# Patient Record
Sex: MALE | Race: WHITE | NOT HISPANIC OR LATINO | Employment: OTHER | URBAN - METROPOLITAN AREA
[De-identification: names, ages, dates, MRNs, and addresses within clinical notes are randomized per-mention and may not be internally consistent; named-entity substitution may affect disease eponyms.]

---

## 2017-01-27 ENCOUNTER — ALLSCRIPTS OFFICE VISIT (OUTPATIENT)
Dept: OTHER | Facility: OTHER | Age: 62
End: 2017-01-27

## 2017-01-27 ENCOUNTER — LAB (OUTPATIENT)
Dept: LAB | Facility: HOSPITAL | Age: 62
End: 2017-01-27
Payer: COMMERCIAL

## 2017-01-27 DIAGNOSIS — M54.50 LOW BACK PAIN: ICD-10-CM

## 2017-01-27 DIAGNOSIS — M54.9 DORSALGIA: ICD-10-CM

## 2017-01-27 PROCEDURE — 80307 DRUG TEST PRSMV CHEM ANLYZR: CPT

## 2017-01-31 LAB
AMPHETAMINES UR QL SCN: NEGATIVE NG/ML
BARBITURATES UR QL SCN: NEGATIVE NG/ML
BENZODIAZ UR QL SCN: NEGATIVE NG/ML
BZE UR QL SCN: NEGATIVE NG/ML
CANNABINOIDS UR QL SCN: NEGATIVE NG/ML
METHADONE UR QL SCN: NEGATIVE NG/ML
OPIATES UR QL: NEGATIVE
OXYCODONE+OXYMORPHONE SERPLBLD QL SCN: NEGATIVE
OXYCODONE+OXYMORPHONE UR QL SCN: POSITIVE
OXYMORPHONE UR CFM-MCNC: 114 NG/ML
OXYMORPHONE UR QL CFM: POSITIVE
PCP UR QL: NEGATIVE NG/ML
PROPOXYPH UR QL: NEGATIVE NG/ML

## 2017-02-14 LAB — MISCELLANEOUS LAB TEST RESULT: NORMAL

## 2017-04-27 ENCOUNTER — ALLSCRIPTS OFFICE VISIT (OUTPATIENT)
Dept: OTHER | Facility: OTHER | Age: 62
End: 2017-04-27

## 2017-05-04 ENCOUNTER — LAB REQUISITION (OUTPATIENT)
Dept: LAB | Facility: HOSPITAL | Age: 62
End: 2017-05-04
Payer: COMMERCIAL

## 2017-05-04 DIAGNOSIS — M54.9 DORSALGIA: ICD-10-CM

## 2017-05-04 DIAGNOSIS — M54.50 LOW BACK PAIN: ICD-10-CM

## 2017-05-04 PROCEDURE — 80307 DRUG TEST PRSMV CHEM ANLYZR: CPT | Performed by: FAMILY MEDICINE

## 2017-05-06 LAB
OXYCODONE UR QL CFM: 4237 NG/ML
OXYCODONE+OXYMORPHONE SERPLBLD QL SCN: POSITIVE
OXYCODONE+OXYMORPHONE UR QL SCN: POSITIVE
OXYMORPHONE UR CFM-MCNC: 6334 NG/ML
OXYMORPHONE UR QL CFM: POSITIVE

## 2017-07-25 ENCOUNTER — ALLSCRIPTS OFFICE VISIT (OUTPATIENT)
Dept: OTHER | Facility: OTHER | Age: 62
End: 2017-07-25

## 2017-10-17 ENCOUNTER — ALLSCRIPTS OFFICE VISIT (OUTPATIENT)
Dept: OTHER | Facility: OTHER | Age: 62
End: 2017-10-17

## 2017-10-17 DIAGNOSIS — Z13.1 ENCOUNTER FOR SCREENING FOR DIABETES MELLITUS: ICD-10-CM

## 2017-10-17 DIAGNOSIS — R63.4 ABNORMAL WEIGHT LOSS: ICD-10-CM

## 2017-10-17 DIAGNOSIS — Z13.6 ENCOUNTER FOR SCREENING FOR CARDIOVASCULAR DISORDERS: ICD-10-CM

## 2018-01-12 VITALS
WEIGHT: 177.5 LBS | DIASTOLIC BLOOD PRESSURE: 78 MMHG | HEIGHT: 72 IN | BODY MASS INDEX: 24.04 KG/M2 | OXYGEN SATURATION: 96 % | SYSTOLIC BLOOD PRESSURE: 132 MMHG | RESPIRATION RATE: 15 BRPM | HEART RATE: 82 BPM

## 2018-01-13 VITALS
WEIGHT: 178.25 LBS | RESPIRATION RATE: 15 BRPM | HEART RATE: 90 BPM | OXYGEN SATURATION: 97 % | BODY MASS INDEX: 24.14 KG/M2 | DIASTOLIC BLOOD PRESSURE: 78 MMHG | HEIGHT: 72 IN | SYSTOLIC BLOOD PRESSURE: 148 MMHG

## 2018-01-13 VITALS
DIASTOLIC BLOOD PRESSURE: 84 MMHG | RESPIRATION RATE: 15 BRPM | WEIGHT: 169.5 LBS | OXYGEN SATURATION: 98 % | HEIGHT: 72 IN | BODY MASS INDEX: 22.96 KG/M2 | HEART RATE: 77 BPM | SYSTOLIC BLOOD PRESSURE: 144 MMHG

## 2018-01-13 VITALS
DIASTOLIC BLOOD PRESSURE: 84 MMHG | WEIGHT: 174 LBS | OXYGEN SATURATION: 96 % | RESPIRATION RATE: 15 BRPM | SYSTOLIC BLOOD PRESSURE: 140 MMHG | HEART RATE: 97 BPM | HEIGHT: 72 IN | BODY MASS INDEX: 23.57 KG/M2

## 2018-01-15 ENCOUNTER — ALLSCRIPTS OFFICE VISIT (OUTPATIENT)
Dept: OTHER | Facility: OTHER | Age: 63
End: 2018-01-15

## 2018-01-16 NOTE — PROGRESS NOTES
Assessment   1  Benign essential hypertension (401 1) (I10)   2  Chronic obstructive pulmonary disease (496) (J44 9)   3  Chronic low back pain (724 2,338 29) (M54 5,G89 29)    Plan   Benign essential hypertension    · AmLODIPine Besylate 5 MG Oral Tablet; TAKE 1 TABLET DAILY AS DIRECTED  Chronic low back pain    · Capsaicin 0 025 % External Cream; APPLY GENTLY TO AFFECTED AREA 3-4    TIMES DAILY  Chronic obstructive pulmonary disease    · Doxycycline Hyclate 100 MG Oral Capsule   · Combivent Respimat  MCG/ACT Inhalation Aerosol Solution; ONE    INHALATION 4 TIMES DAILY AS NEEDED (MAX OF 6 INHALATIONS PER    24 HOURS)  Depression with anxiety    · DULoxetine HCl - 60 MG Oral Capsule Delayed Release Particles; TAKE 1    CAPSULE BY MOUTH EVERY DAY  GERD (gastroesophageal reflux disease)    · Pantoprazole Sodium 40 MG Oral Tablet Delayed Release (Protonix); TAKE 1    TABLET DAILY  Left knee pain    · Diclofenac Sodium 1 % Transdermal Gel; APPLY 2 GRAMS AFFECTED AREA-S    EVERY 6 HOURS AS NEEDED FOR PAIN -EXTERNAL USE ONLY  Other chronic pain    · Acetaminophen-Codeine #4 300-60 MG Oral Tablet; TAKE 1 TABLET EVERY 6    TO 8 HOURS AS NEEDED FOR PAIN   · Cyclobenzaprine HCl - 10 MG Oral Tablet; Take 1 three times daily as needed   · Oxycodone-Acetaminophen 5-325 MG Oral Tablet; TAKE 1 TABLET EVERY 6    HOURS AS NEEDED    Discussion/Summary   Possible side effects of new medications were reviewed with the patient/guardian today  The treatment plan was reviewed with the patient/guardian  The patient/guardian understands and agrees with the treatment plan      Chief Complaint   PT C/O KNEE PAIN  WOULD LIKE SCRIPT FOR VOLTAREN GEL    Patient is here today for follow up of chronic conditions described in HPI  History of Present Illness   His BP is not under control based on the new BP guidelines  He has no CP or SOB  He has no HA or vision changes  has chronic low back pain   He takes Tylenol #4 on a regular basis and Percocet when he needs it  He has a treatment agreement in the chart and urine toxicology consistent with regular use  He has no side effects  He takes Cymbalta for pain as well  is using a knee brace on the left knee  He has a back brace  He gets good relief from both and he benefits from them (support)  COPD is stable  He has no cough and no increased sputum  He has not quit smoking entirely  Review of Systems        Constitutional: No fever or chills, feels well, no tiredness, no recent weight gain or weight loss  Eyes: No complaints of eye pain, no red eyes, no discharge from eyes, no itchy eyes  ENT: no complaints of earache, no hearing loss, no nosebleeds, no nasal discharge, no sore throat, no hoarseness  Cardiovascular: No complaints of slow heart rate, no fast heart rate, no chest pain, no palpitations, no leg claudication, no lower extremity  Respiratory: No complaints of shortness of breath, no wheezing, no cough, no SOB on exertion, no orthopnea or PND  Gastrointestinal: No complaints of abdominal pain, no constipation, no nausea or vomiting, no diarrhea or bloody stools  Genitourinary: No complaints of dysuria, no incontinence, no hesitancy, no nocturia, no genital lesion, no testicular pain  Musculoskeletal: No complaints of arthralgia, no myalgias, no joint swelling or stiffness, no limb pain or swelling  Integumentary: No complaints of skin rash or skin lesions, no itching, no skin wound, no dry skin  Neurological: No compliants of headache, no confusion, no convulsions, no numbness or tingling, no dizziness or fainting, no limb weakness, no difficulty walking  Psychiatric: Is not suicidal, no sleep disturbances, no anxiety or depression, no change in personality, no emotional problems        Endocrine: No complaints of proptosis, no hot flashes, no muscle weakness, no erectile dysfunction, no deepening of the voice, no feelings of weakness  Hematologic/Lymphatic: No complaints of swollen glands, no swollen glands in the neck, does not bleed easily, no easy bruising  Active Problems   1  Anxiety (300 00) (F41 9)   2  Back pain, chronic (724 5,338 29) (M54 9,G89 29)   3  Chronic low back pain (724 2,338 29) (M54 5,G89 29)   4  Chronic obstructive pulmonary disease (496) (J44 9)   5  Current tobacco use (305 1) (Z72 0)   6  Depression screen (V79 0) (Z13 89)   7  Depression with anxiety (300 4) (F41 8)   8  Early satiety (780 94) (R68 81)   9  Encounter for screening for malignant neoplasm of colon (V76 51) (Z12 11)   10  Fatigue (780 79) (R53 83)   11  GERD (gastroesophageal reflux disease) (530 81) (K21 9)   12  Headache (784 0) (R51)   13  Incisional hernia (553 21) (K43 2)   14  Insomnia (780 52) (G47 00)   15  Left knee pain (719 46) (M25 562)   16  Long term use of drug (V58 69) (Z79 899)   17  Malingering (V65 2) (Z76 5)   18  Neck pain (723 1) (M54 2)   19  Osteoarthritis of both knees, unspecified osteoarthritis type (715 96) (M17 0)   20  Other chest pain (786 59) (R07 89)   21  Other chronic pain (338 29) (G89 29)   22  Other instability, right knee (718 86) (M25 361)   23  Pain in joint of left shoulder (719 41) (M25 512)   24  Pain of left hip (719 45) (M25 552)   25  Screening for diabetes mellitus (DM) (V77 1) (Z13 1)   26  Screening for ischemic heart disease (V81 0) (Z13 6)   27  Special screening examination for neoplasm of prostate (V76 44) (Z12 5)   28  Weight loss, non-intentional (783 21) (R63 4)    Past Medical History   1  History of hyperthyroidism (V12 29) (Z86 39)   2  History of upper respiratory infection (V12 09) (Z87 09)    Surgical History   1  History of Inguinal Hernia Repair    Family History   Mother    1  Family history of cardiac disorder (V17 49) (Z82 49)   2  Family history unobtainable (V49 89) (Z78 9)  Father    3  Family history of cardiac disorder (V17 49) (Z82 49)   4   Family history unobtainable (V49 89) (Z78 9)  Maternal Grandmother    5  Family history of cardiac disorder (V17 49) (Z82 49)  Paternal Grandmother    10  Family history of cardiac disorder (V17 49) (Z82 49)  Maternal Grandfather    7  Family history of cardiac disorder (V17 49) (Z82 49)  Paternal Grandfather    8  Family history of cardiac disorder (V17 49) (Z82 49)    Social History    · Denied: History of Alcohol Use (History)   · Denied: History of Drug Use   · Former smoker (V15 82) (K02 462)    Current Meds    1  Acetaminophen-Codeine #4 300-60 MG Oral Tablet; TAKE 1 TABLET EVERY 6 TO 8     HOURS AS NEEDED FOR PAIN;     Therapy: 05Mdj3915 to (Evaluate:04Mar2018); Last Rx:17Oct2017 Ordered   2  Albuterol Sulfate (2 5 MG/3ML) 0 083% Inhalation Nebulization Solution; USE 1 UNIT     DOSE EVERY 4-6 HOURS AS NEEDED FOR WHEEZING ; Therapy: 08NHN4064 to (Last Rx:21Nov2014)  Requested for: 21Nov2014 Ordered   3  ALPRAZolam 0 25 MG Oral Tablet; TAKE 1 TABLET EVERY 12 HOURS AS NEEDED; Last     Rx:67Oft8414 Ordered   4  Aspirin 81 MG TABS; Therapy: (Recorded:14Nov2012) to Recorded   5  Capsaicin 0 025 % External Cream; APPLY GENTLY TO AFFECTED AREA 3-4 TIMES     DAILY; Therapy: 27Apr2017 to (Last Rx:21Ysa7130)  Requested for: 22Bmv2588 Ordered   6  Combivent Respimat  MCG/ACT Inhalation Aerosol Solution; ONE INHALATION 4     TIMES DAILY AS NEEDED (MAX OF 6 INHALATIONS PER 24 HOURS); Therapy: 74NUY6891 to (Last Rx:77Gun9576)  Requested for: 11Klt1207 Ordered   7  Cyclobenzaprine HCl - 10 MG Oral Tablet; Take 1 three times daily as needed; Therapy: 88AZS5743 to (Last Rx:72Bld6891)  Requested for: 80Xuw9519 Ordered   8  Diclofenac Sodium 1 % Transdermal Gel; APPLY 2 GRAMS AFFECTED AREA-S EVERY 6     HOURS AS NEEDED FOR PAIN -EXTERNAL USE ONLY; Therapy: 20ZND4587 to (Last Rx:17Oct2017)  Requested for: 25UJF7487 Ordered   9   Doxycycline Hyclate 100 MG Oral Capsule; TAKE 1 CAPSULE EVERY 12 HOURS DAILY; Therapy: 71TRC1261 to (Evaluate:54Zne3495)  Requested for: 67UGX4131; Last     Rx:27Jan2017 Ordered   10  DULoxetine HCl - 60 MG Oral Capsule Delayed Release Particles; TAKE 1 CAPSULE BY      MOUTH EVERY DAY; Therapy: 12Lnj9469 to (Evaluate:22Nov2017)  Requested for: 99Fun8039; Last      Rx:73Tbl3321 Ordered   11  Ipratropium-Albuterol 0 5-2 5 (3) MG/3ML Inhalation Solution; USE 1 UNIT DOSE IN      NEBULIZER EVERY 4 HOURS AS NEEDED; Therapy: 11JPB0447 to (Last Rx:11Kkq4041)  Requested for: 44Acc7990 Ordered   12  Oxycodone-Acetaminophen 5-325 MG Oral Tablet; TAKE 1 TABLET EVERY 6 HOURS AS      NEEDED; Therapy: 35PDD6928 to (Evaluate:11Nov2017); Last Rx:17Fvq4853 Ordered   13  Pantoprazole Sodium 40 MG Oral Tablet Delayed Release; TAKE 1 TABLET DAILY; Therapy: 99PWX5285 to (21 )  Requested for: 27Apr2017; Last      Rx:27Apr2017 Ordered   14  PredniSONE 10 MG Oral Tablet; 4 tablets daily for 3 days, then      3 tablets daily for 3 days, then      2 tablets daily for 3 days, then      1 tablet daily for 3 days; Therapy: 55YYG0409 to (Last KA:96EWH4010)  Requested for: 93NBB1304 Ordered   15  Stiolto Respimat 2 5-2 5 MCG/ACT Inhalation Aerosol Solution; USE 1 PUFF DAILY; Therapy: 69ZOM7564 to (Last Jazielopal Whiteuber)  Requested for: 27Apr2017 Ordered   16  Zolpidem Tartrate 5 MG Oral Tablet; TAKE 1 TABLET AT BEDTIME AS NEEDED; Therapy: 77NOR9924 to (Evaluate:15Jan2018); Last Rx:17Oct2017 Ordered    Allergies   1  No Known Drug Allergies    Vitals   Vital Signs    Recorded: 27UKY5406 01:53PM   Heart Rate 91   Respiration 15   Systolic 974   Diastolic 84   Height 6 ft    Weight 167 lb    BMI Calculated 22 65   BSA Calculated 1 97   O2 Saturation 97     Physical Exam        Constitutional      General appearance: No acute distress, well appearing and well nourished  Pulmonary      Respiratory effort: No increased work of breathing or signs of respiratory distress  Auscultation of lungs: Clear to auscultation, equal breath sounds bilaterally, no wheezes, no rales, no rhonci  Cardiovascular      Auscultation of heart: Normal rate and rhythm, normal S1 and S2, without murmurs  Examination of extremities for edema and/or varicosities: Normal        Abdomen      Abdomen: Non-tender, no masses  Liver and spleen: No hepatomegaly or splenomegaly  Musculoskeletal      Gait and station: Normal        Digits and nails: Normal without clubbing or cyanosis  Inspection/palpation of joints, bones, and muscles: Normal           Health Management   Chronic obstructive pulmonary disease   Peak Flow; every 1 year; Next Due: 67CSS2677;  Overdue    Signatures    Electronically signed by : Arturo Almaraz MD; Reji 15 2018  2:34PM EST                       (Author)

## 2018-01-22 VITALS
DIASTOLIC BLOOD PRESSURE: 84 MMHG | SYSTOLIC BLOOD PRESSURE: 142 MMHG | OXYGEN SATURATION: 97 % | HEIGHT: 72 IN | RESPIRATION RATE: 15 BRPM | BODY MASS INDEX: 22.62 KG/M2 | HEART RATE: 91 BPM | WEIGHT: 167 LBS

## 2018-04-11 ENCOUNTER — TELEPHONE (OUTPATIENT)
Dept: FAMILY MEDICINE CLINIC | Facility: CLINIC | Age: 63
End: 2018-04-11

## 2018-04-11 DIAGNOSIS — I10 ESSENTIAL HYPERTENSION: Primary | ICD-10-CM

## 2018-04-11 RX ORDER — AMLODIPINE BESYLATE 5 MG/1
1 TABLET ORAL DAILY
COMMUNITY
Start: 2018-01-15 | End: 2018-07-13 | Stop reason: SDUPTHER

## 2018-04-11 RX ORDER — PREDNISONE 10 MG/1
TABLET ORAL
COMMUNITY
Start: 2017-01-27 | End: 2018-07-11 | Stop reason: ALTCHOICE

## 2018-04-11 RX ORDER — DULOXETIN HYDROCHLORIDE 60 MG/1
1 CAPSULE, DELAYED RELEASE ORAL DAILY
COMMUNITY
Start: 2015-02-20 | End: 2019-02-08 | Stop reason: SDUPTHER

## 2018-04-11 RX ORDER — ZOLPIDEM TARTRATE 5 MG/1
TABLET ORAL
Refills: 0 | COMMUNITY
Start: 2018-04-07 | End: 2018-04-13 | Stop reason: SDUPTHER

## 2018-04-11 RX ORDER — CYCLOBENZAPRINE HCL 10 MG
TABLET ORAL 3 TIMES DAILY PRN
COMMUNITY
Start: 2013-03-26 | End: 2018-07-13 | Stop reason: SDUPTHER

## 2018-04-11 RX ORDER — PANTOPRAZOLE SODIUM 40 MG/1
1 TABLET, DELAYED RELEASE ORAL DAILY
COMMUNITY
Start: 2014-10-31 | End: 2019-02-08 | Stop reason: SDUPTHER

## 2018-04-11 RX ORDER — IPRATROPIUM BROMIDE AND ALBUTEROL SULFATE 2.5; .5 MG/3ML; MG/3ML
1 SOLUTION RESPIRATORY (INHALATION) EVERY 4 HOURS PRN
COMMUNITY
Start: 2012-12-10 | End: 2019-01-17

## 2018-04-11 RX ORDER — CAPSAICIN 0.025 %
CREAM (GRAM) TOPICAL
COMMUNITY
Start: 2017-04-27 | End: 2018-07-13 | Stop reason: SDUPTHER

## 2018-04-11 RX ORDER — ALPRAZOLAM 0.25 MG/1
TABLET ORAL
Refills: 0 | COMMUNITY
Start: 2018-04-07 | End: 2018-07-13 | Stop reason: SDUPTHER

## 2018-04-11 RX ORDER — ALBUTEROL SULFATE 2.5 MG/3ML
1 SOLUTION RESPIRATORY (INHALATION)
COMMUNITY
Start: 2013-01-24 | End: 2019-01-18 | Stop reason: SDUPTHER

## 2018-04-11 RX ORDER — ACETAMINOPHEN AND CODEINE PHOSPHATE 60; 300 MG/1; MG/1
TABLET ORAL
Refills: 0 | COMMUNITY
Start: 2018-04-02 | End: 2018-04-13 | Stop reason: SDUPTHER

## 2018-04-11 RX ORDER — OXYCODONE HYDROCHLORIDE AND ACETAMINOPHEN 5; 325 MG/1; MG/1
1 TABLET ORAL EVERY 6 HOURS PRN
Refills: 0 | COMMUNITY
Start: 2018-03-15 | End: 2018-04-13 | Stop reason: SDUPTHER

## 2018-04-13 ENCOUNTER — OFFICE VISIT (OUTPATIENT)
Dept: FAMILY MEDICINE CLINIC | Facility: CLINIC | Age: 63
End: 2018-04-13
Payer: COMMERCIAL

## 2018-04-13 VITALS
HEIGHT: 72 IN | WEIGHT: 164 LBS | OXYGEN SATURATION: 98 % | DIASTOLIC BLOOD PRESSURE: 70 MMHG | SYSTOLIC BLOOD PRESSURE: 128 MMHG | HEART RATE: 92 BPM | RESPIRATION RATE: 15 BRPM | BODY MASS INDEX: 22.21 KG/M2

## 2018-04-13 DIAGNOSIS — G47.00 INSOMNIA, UNSPECIFIED TYPE: ICD-10-CM

## 2018-04-13 DIAGNOSIS — I10 ESSENTIAL HYPERTENSION: ICD-10-CM

## 2018-04-13 DIAGNOSIS — R52 PAIN: Primary | ICD-10-CM

## 2018-04-13 DIAGNOSIS — Z11.59 ENCOUNTER FOR HEPATITIS C SCREENING TEST FOR LOW RISK PATIENT: ICD-10-CM

## 2018-04-13 PROCEDURE — 99214 OFFICE O/P EST MOD 30 MIN: CPT | Performed by: FAMILY MEDICINE

## 2018-04-13 RX ORDER — ACETAMINOPHEN AND CODEINE PHOSPHATE 60; 300 MG/1; MG/1
1 TABLET ORAL EVERY 6 HOURS PRN
Qty: 120 TABLET | Refills: 5 | Status: SHIPPED | OUTPATIENT
Start: 2018-04-13 | End: 2018-07-13 | Stop reason: SDUPTHER

## 2018-04-13 RX ORDER — OXYCODONE HYDROCHLORIDE AND ACETAMINOPHEN 5; 325 MG/1; MG/1
1 TABLET ORAL EVERY 6 HOURS PRN
Qty: 100 TABLET | Refills: 0 | Status: CANCELLED | OUTPATIENT
Start: 2018-04-13

## 2018-04-13 RX ORDER — ZOLPIDEM TARTRATE 5 MG/1
5 TABLET ORAL
Qty: 30 TABLET | Refills: 5 | Status: SHIPPED | OUTPATIENT
Start: 2018-04-13 | End: 2018-10-18 | Stop reason: SDUPTHER

## 2018-04-13 RX ORDER — OXYCODONE HYDROCHLORIDE AND ACETAMINOPHEN 5; 325 MG/1; MG/1
1 TABLET ORAL EVERY 6 HOURS PRN
Qty: 90 TABLET | Refills: 0 | Status: SHIPPED | OUTPATIENT
Start: 2018-04-13 | End: 2018-05-03 | Stop reason: SDUPTHER

## 2018-04-13 NOTE — PROGRESS NOTES
Assessment/Plan:    No problem-specific Assessment & Plan notes found for this encounter  Diagnoses and all orders for this visit:    Pain  -     acetaminophen-codeine (TYLENOL #4) 300-60 MG per tablet; Take 1 tablet by mouth every 6 (six) hours as needed for moderate pain  -     oxyCODONE-acetaminophen (PERCOCET) 5-325 mg per tablet; Take 1 tablet by mouth every 6 (six) hours as needed for moderate pain or severe pain Max Daily Amount: 4 tablets  -     diclofenac sodium (VOLTAREN) 1 %; Apply 2 g topically 4 (four) times a day    Insomnia, unspecified type  -     zolpidem (AMBIEN) 5 mg tablet; Take 1 tablet (5 mg total) by mouth daily at bedtime as needed for sleep    Encounter for hepatitis C screening test for low risk patient  -     Hepatitis C antibody; Future    Essential hypertension  -     Lipid panel; Future  -     Comprehensive metabolic panel; Future          Subjective:      Patient ID: Andree De León is a 57 y o  male  He is doing well overall  He has chronic knee and back pain  He takes Tylenol 4 for moderate pain and percocet for moderate to severe pain  He has signed treatment agreement in the chart  He is aware of the long-term risks and benefits of opiate based medication  He is of the belief that the benefits that his case outweigh the risks  He has good pain relief and no side effects from his current regimen  I queried the state prescription drug monitoring program and found that we are the only provider writing for his medications  He has urine toxicology consistent with regular use of his controlled substances  He is wearing a knee brace and a back brace  They provide him good pain relief and increase his functionality  He is also using Cymbalta for pain  His blood pressure is under good control on his current regimen  He has COPD that is under good control on his current regimen  He has little cough or sputum production  He has no shortness of breath    He has cut down his smoking but has not been able to quit entirely  Back Pain   This is a chronic problem  The current episode started more than 1 year ago  The problem occurs constantly  The problem is unchanged  The pain is present in the sacro-iliac  The quality of the pain is described as stabbing  The pain radiates to the left knee  The pain is at a severity of 8/10  The pain is worse during the day  The symptoms are aggravated by position  Stiffness is present in the morning  Associated symptoms include bowel incontinence, leg pain and pelvic pain  Pertinent negatives include no chest pain, perianal numbness or weight loss  The following portions of the patient's history were reviewed and updated as appropriate:   He  has a past medical history of Hyperthyroidism  He   Patient Active Problem List    Diagnosis Date Noted    Encounter for hepatitis C screening test for low risk patient 04/13/2018    Pain 04/13/2018     He  has a past surgical history that includes Inguinal hernia repair (Bilateral)  His family history includes Heart disease in his father, maternal grandfather, maternal grandmother, mother, paternal grandfather, and paternal grandmother  He  reports that he has been smoking Cigarettes  He has never used smokeless tobacco  He reports that he does not drink alcohol or use drugs    Current Outpatient Prescriptions   Medication Sig Dispense Refill    acetaminophen-codeine (TYLENOL #4) 300-60 MG per tablet Take 1 tablet by mouth every 6 (six) hours as needed for moderate pain 120 tablet 5    albuterol (2 5 mg/3 mL) 0 083 % nebulizer solution Inhale 1 each      ALPRAZolam (XANAX) 0 25 mg tablet   0    amLODIPine (NORVASC) 5 mg tablet Take 1 tablet by mouth daily      aspirin 81 MG tablet Take by mouth      capsaicin (ZOSTRIX) 0 025 % cream Apply topically      cyclobenzaprine (FLEXERIL) 10 mg tablet Take by mouth 3 (three) times a day as needed      DULoxetine (CYMBALTA) 60 mg delayed release capsule Take 1 capsule by mouth daily      ipratropium-albuterol (COMBIVENT RESPIMAT) inhaler Inhale      ipratropium-albuterol (DUO-NEB) 0 5-2 5 mg/3 mL Inhale 1 each every 4 (four) hours as needed      oxyCODONE-acetaminophen (PERCOCET) 5-325 mg per tablet Take 1 tablet by mouth every 6 (six) hours as needed for moderate pain or severe pain Max Daily Amount: 4 tablets 90 tablet 0    pantoprazole (PROTONIX) 40 mg tablet Take 1 tablet by mouth daily      Tiotropium Bromide-Olodaterol (STIOLTO RESPIMAT) 2 5-2 5 MCG/ACT AERS Inhale 1 puff daily      zolpidem (AMBIEN) 5 mg tablet Take 1 tablet (5 mg total) by mouth daily at bedtime as needed for sleep 30 tablet 5    diclofenac sodium (VOLTAREN) 1 % Apply 2 g topically 4 (four) times a day 5 Tube 5    predniSONE 10 mg tablet Take by mouth       No current facility-administered medications for this visit        Current Outpatient Prescriptions on File Prior to Visit   Medication Sig    albuterol (2 5 mg/3 mL) 0 083 % nebulizer solution Inhale 1 each    ALPRAZolam (XANAX) 0 25 mg tablet     amLODIPine (NORVASC) 5 mg tablet Take 1 tablet by mouth daily    aspirin 81 MG tablet Take by mouth    capsaicin (ZOSTRIX) 0 025 % cream Apply topically    cyclobenzaprine (FLEXERIL) 10 mg tablet Take by mouth 3 (three) times a day as needed    DULoxetine (CYMBALTA) 60 mg delayed release capsule Take 1 capsule by mouth daily    ipratropium-albuterol (COMBIVENT RESPIMAT) inhaler Inhale    ipratropium-albuterol (DUO-NEB) 0 5-2 5 mg/3 mL Inhale 1 each every 4 (four) hours as needed    pantoprazole (PROTONIX) 40 mg tablet Take 1 tablet by mouth daily    Tiotropium Bromide-Olodaterol (STIOLTO RESPIMAT) 2 5-2 5 MCG/ACT AERS Inhale 1 puff daily    [DISCONTINUED] acetaminophen-codeine (TYLENOL #4) 300-60 MG per tablet take 1 tablet every 6 to 8 hours if needed for pain    [DISCONTINUED] oxyCODONE-acetaminophen (PERCOCET) 5-325 mg per tablet Take 1 tablet by mouth every 6 (six) hours as needed    [DISCONTINUED] zolpidem (AMBIEN) 5 mg tablet     predniSONE 10 mg tablet Take by mouth     No current facility-administered medications on file prior to visit  He has No Known Allergies       Review of Systems   Constitutional: Negative for weight loss  Cardiovascular: Negative for chest pain  Gastrointestinal: Positive for bowel incontinence  Genitourinary: Positive for pelvic pain  Musculoskeletal: Positive for back pain  All other systems reviewed and are negative  Objective:      /70 (BP Location: Right arm, Patient Position: Sitting, Cuff Size: Standard)   Pulse 92   Resp 15   Ht 6' (1 829 m)   Wt 74 4 kg (164 lb)   SpO2 98%   BMI 22 24 kg/m²          Physical Exam   Constitutional: He is oriented to person, place, and time  He appears well-developed and well-nourished  Neck: Normal range of motion  Neck supple  Cardiovascular: Normal rate, normal heart sounds and intact distal pulses  Pulmonary/Chest: Effort normal and breath sounds normal    Abdominal: Soft  Bowel sounds are normal    Musculoskeletal: Normal range of motion  Neurological: He is alert and oriented to person, place, and time  He has normal reflexes  Skin: Skin is warm and dry  Psychiatric: He has a normal mood and affect  His behavior is normal  Thought content normal    Nursing note and vitals reviewed

## 2018-04-13 NOTE — PATIENT INSTRUCTIONS

## 2018-05-03 DIAGNOSIS — R52 PAIN: ICD-10-CM

## 2018-05-03 RX ORDER — OXYCODONE HYDROCHLORIDE AND ACETAMINOPHEN 5; 325 MG/1; MG/1
1 TABLET ORAL EVERY 6 HOURS PRN
Qty: 100 TABLET | Refills: 0 | Status: SHIPPED | OUTPATIENT
Start: 2018-05-03 | End: 2018-05-31 | Stop reason: SDUPTHER

## 2018-05-31 DIAGNOSIS — R52 PAIN: ICD-10-CM

## 2018-05-31 RX ORDER — OXYCODONE HYDROCHLORIDE AND ACETAMINOPHEN 5; 325 MG/1; MG/1
1 TABLET ORAL EVERY 6 HOURS PRN
Qty: 100 TABLET | Refills: 0 | Status: SHIPPED | OUTPATIENT
Start: 2018-05-31 | End: 2018-05-31 | Stop reason: SDUPTHER

## 2018-05-31 RX ORDER — OXYCODONE HYDROCHLORIDE AND ACETAMINOPHEN 5; 325 MG/1; MG/1
1 TABLET ORAL EVERY 6 HOURS PRN
Qty: 100 TABLET | Refills: 0 | Status: SHIPPED | OUTPATIENT
Start: 2018-05-31 | End: 2018-06-25 | Stop reason: SDUPTHER

## 2018-06-16 DIAGNOSIS — R52 PAIN: ICD-10-CM

## 2018-06-16 RX ORDER — ACETAMINOPHEN AND CODEINE PHOSPHATE 60; 300 MG/1; MG/1
1 TABLET ORAL EVERY 6 HOURS PRN
Qty: 120 TABLET | Refills: 0 | Status: CANCELLED | OUTPATIENT
Start: 2018-06-16

## 2018-06-25 DIAGNOSIS — R52 PAIN: ICD-10-CM

## 2018-06-25 RX ORDER — OXYCODONE HYDROCHLORIDE AND ACETAMINOPHEN 5; 325 MG/1; MG/1
1 TABLET ORAL EVERY 6 HOURS PRN
Qty: 100 TABLET | Refills: 0 | Status: CANCELLED | OUTPATIENT
Start: 2018-06-25

## 2018-06-25 RX ORDER — OXYCODONE HYDROCHLORIDE AND ACETAMINOPHEN 5; 325 MG/1; MG/1
1 TABLET ORAL EVERY 6 HOURS PRN
Qty: 100 TABLET | Refills: 0 | Status: SHIPPED | OUTPATIENT
Start: 2018-06-25 | End: 2018-07-13 | Stop reason: SDUPTHER

## 2018-06-26 DIAGNOSIS — R52 PAIN: ICD-10-CM

## 2018-06-26 RX ORDER — OXYCODONE HYDROCHLORIDE AND ACETAMINOPHEN 5; 325 MG/1; MG/1
1 TABLET ORAL EVERY 6 HOURS PRN
Qty: 100 TABLET | Refills: 0 | OUTPATIENT
Start: 2018-06-26

## 2018-07-13 ENCOUNTER — OFFICE VISIT (OUTPATIENT)
Dept: FAMILY MEDICINE CLINIC | Facility: CLINIC | Age: 63
End: 2018-07-13
Payer: COMMERCIAL

## 2018-07-13 VITALS
RESPIRATION RATE: 15 BRPM | HEIGHT: 72 IN | HEART RATE: 80 BPM | DIASTOLIC BLOOD PRESSURE: 70 MMHG | WEIGHT: 163 LBS | BODY MASS INDEX: 22.08 KG/M2 | SYSTOLIC BLOOD PRESSURE: 124 MMHG | OXYGEN SATURATION: 96 %

## 2018-07-13 DIAGNOSIS — R52 PAIN: ICD-10-CM

## 2018-07-13 DIAGNOSIS — I10 HYPERTENSION, UNSPECIFIED TYPE: ICD-10-CM

## 2018-07-13 DIAGNOSIS — I10 ESSENTIAL HYPERTENSION: ICD-10-CM

## 2018-07-13 DIAGNOSIS — J44.9 CHRONIC OBSTRUCTIVE PULMONARY DISEASE, UNSPECIFIED COPD TYPE (HCC): Primary | ICD-10-CM

## 2018-07-13 DIAGNOSIS — J44.9 CHRONIC OBSTRUCTIVE PULMONARY DISEASE, UNSPECIFIED COPD TYPE (HCC): ICD-10-CM

## 2018-07-13 DIAGNOSIS — F41.9 ANXIETY: ICD-10-CM

## 2018-07-13 DIAGNOSIS — F41.9 ANXIETY: Primary | ICD-10-CM

## 2018-07-13 PROCEDURE — 99214 OFFICE O/P EST MOD 30 MIN: CPT | Performed by: FAMILY MEDICINE

## 2018-07-13 PROCEDURE — 80307 DRUG TEST PRSMV CHEM ANLYZR: CPT | Performed by: FAMILY MEDICINE

## 2018-07-13 PROCEDURE — 80365 DRUG SCREENING OXYCODONE: CPT | Performed by: FAMILY MEDICINE

## 2018-07-13 PROCEDURE — 3008F BODY MASS INDEX DOCD: CPT | Performed by: FAMILY MEDICINE

## 2018-07-13 RX ORDER — ALPRAZOLAM 0.25 MG/1
0.25 TABLET ORAL 2 TIMES DAILY PRN
Qty: 60 TABLET | Refills: 0 | Status: CANCELLED | OUTPATIENT
Start: 2018-07-13

## 2018-07-13 RX ORDER — CAPSAICIN 0.025 %
1 CREAM (GRAM) TOPICAL 2 TIMES DAILY
Qty: 60 G | Refills: 3 | Status: CANCELLED | OUTPATIENT
Start: 2018-07-13

## 2018-07-13 RX ORDER — ACETAMINOPHEN AND CODEINE PHOSPHATE 60; 300 MG/1; MG/1
1 TABLET ORAL EVERY 6 HOURS PRN
Qty: 120 TABLET | Refills: 5 | Status: SHIPPED | OUTPATIENT
Start: 2018-07-13 | End: 2018-08-13 | Stop reason: SDUPTHER

## 2018-07-13 RX ORDER — AMLODIPINE BESYLATE 5 MG/1
5 TABLET ORAL DAILY
Qty: 30 TABLET | Refills: 5 | Status: CANCELLED | OUTPATIENT
Start: 2018-07-13

## 2018-07-13 RX ORDER — ALPRAZOLAM 0.25 MG/1
0.25 TABLET ORAL 3 TIMES DAILY PRN
Qty: 90 TABLET | Refills: 5 | Status: SHIPPED | OUTPATIENT
Start: 2018-07-13 | End: 2018-12-31 | Stop reason: SDUPTHER

## 2018-07-13 RX ORDER — OXYCODONE HYDROCHLORIDE AND ACETAMINOPHEN 5; 325 MG/1; MG/1
1 TABLET ORAL EVERY 6 HOURS PRN
Qty: 100 TABLET | Refills: 0 | Status: CANCELLED | OUTPATIENT
Start: 2018-07-13

## 2018-07-13 RX ORDER — OXYCODONE HYDROCHLORIDE AND ACETAMINOPHEN 5; 325 MG/1; MG/1
1 TABLET ORAL EVERY 6 HOURS PRN
Qty: 100 TABLET | Refills: 0 | Status: SHIPPED | OUTPATIENT
Start: 2018-07-13 | End: 2018-07-13 | Stop reason: SDUPTHER

## 2018-07-13 RX ORDER — OXYCODONE HYDROCHLORIDE AND ACETAMINOPHEN 5; 325 MG/1; MG/1
1 TABLET ORAL EVERY 6 HOURS PRN
Qty: 100 TABLET | Refills: 0 | Status: SHIPPED | OUTPATIENT
Start: 2018-07-13 | End: 2018-08-14 | Stop reason: SDUPTHER

## 2018-07-13 RX ORDER — AMLODIPINE BESYLATE 5 MG/1
5 TABLET ORAL DAILY
Qty: 30 TABLET | Refills: 5 | Status: SHIPPED | OUTPATIENT
Start: 2018-07-13 | End: 2018-08-12 | Stop reason: SDUPTHER

## 2018-07-13 RX ORDER — CYCLOBENZAPRINE HCL 10 MG
10 TABLET ORAL 3 TIMES DAILY PRN
Qty: 30 TABLET | Refills: 5 | Status: SHIPPED | OUTPATIENT
Start: 2018-07-13 | End: 2018-08-12 | Stop reason: SDUPTHER

## 2018-07-13 RX ORDER — CAPSAICIN 0.025 %
1 CREAM (GRAM) TOPICAL 2 TIMES DAILY
Qty: 60 G | Refills: 5 | Status: SHIPPED | OUTPATIENT
Start: 2018-07-13 | End: 2018-08-12 | Stop reason: SDUPTHER

## 2018-07-13 NOTE — PROGRESS NOTES
Assessment/Plan:    No problem-specific Assessment & Plan notes found for this encounter  Diagnoses and all orders for this visit:    Anxiety  -     ALPRAZolam (XANAX) 0 25 mg tablet; Take 1 tablet (0 25 mg total) by mouth 3 (three) times a day as needed for anxiety    Pain  -     oxyCODONE-acetaminophen (PERCOCET) 5-325 mg per tablet; Take 1 tablet by mouth every 6 (six) hours as needed for moderate pain or severe pain Max Daily Amount: 4 tablets  -     acetaminophen-codeine (TYLENOL #4) 300-60 MG per tablet; Take 1 tablet by mouth every 6 (six) hours as needed for moderate pain  -     capsaicin (ZOSTRIX) 0 025 % cream; Apply 1 application topically 2 (two) times a day    Chronic obstructive pulmonary disease, unspecified COPD type (HCC)  -     ipratropium-albuterol (COMBIVENT RESPIMAT) inhaler; Inhale 1 puff 4 (four) times a day    Essential hypertension  -     amLODIPine (NORVASC) 5 mg tablet; Take 1 tablet (5 mg total) by mouth daily          Subjective:      Patient ID: Nitin Soriano is a 57 y o  male  He complains of chronic low back pain since helping his daughter move  He requested increased pain medication  He gets good relief and has no side effects  He last took Percocet yesterday  He has no red flags for back pain  He has a signed treatment agreement in the chart  He is due for urine toxicolgy  His BP is under good control on his current regimen  He has no CP or SOB  He has no HA or vision changes  He has COPD  He continues to smoke and is not interested in quitting  He has no wheeze  Back Pain   This is a chronic problem  The current episode started more than 1 year ago  The problem occurs daily  The problem has been gradually worsening since onset  The pain is present in the sacro-iliac  The quality of the pain is described as stabbing  The pain radiates to the left thigh  The pain is at a severity of 8/10  The pain is the same all the time   The symptoms are aggravated by bending  Stiffness is present in the morning  Associated symptoms include bladder incontinence, chest pain, headaches, leg pain, paresthesias, pelvic pain, tingling and weakness  Pertinent negatives include no abdominal pain, bowel incontinence, dysuria, fever, numbness, paresis or perianal numbness  The following portions of the patient's history were reviewed and updated as appropriate:   He  has a past medical history of Hyperthyroidism  He   Patient Active Problem List    Diagnosis Date Noted    Encounter for hepatitis C screening test for low risk patient 04/13/2018    Pain 04/13/2018    Benign essential hypertension 01/15/2018    Back pain, chronic 08/05/2016    Anxiety 11/14/2012    Chronic obstructive pulmonary disease ([facility] [address] ) 11/14/2012     He  has a past surgical history that includes Inguinal hernia repair (Bilateral)  His family history includes Heart disease in his father, maternal grandfather, maternal grandmother, mother, paternal grandfather, and paternal grandmother  He  reports that he has been smoking Cigarettes  He has never used smokeless tobacco  He reports that he does not drink alcohol or use drugs    Current Outpatient Prescriptions   Medication Sig Dispense Refill    acetaminophen-codeine (TYLENOL #4) 300-60 MG per tablet Take 1 tablet by mouth every 6 (six) hours as needed for moderate pain 120 tablet 5    albuterol (2 5 mg/3 mL) 0 083 % nebulizer solution Inhale 1 each      ALPRAZolam (XANAX) 0 25 mg tablet Take 1 tablet (0 25 mg total) by mouth 3 (three) times a day as needed for anxiety 90 tablet 5    amLODIPine (NORVASC) 5 mg tablet Take 1 tablet (5 mg total) by mouth daily 30 tablet 5    aspirin 81 MG tablet Take by mouth      capsaicin (ZOSTRIX) 0 025 % cream Apply 1 application topically 2 (two) times a day 60 g 5    cyclobenzaprine (FLEXERIL) 10 mg tablet Take by mouth 3 (three) times a day as needed      diclofenac sodium (VOLTAREN) 1 % Apply 2 g topically 4 (four) times a day 5 Tube 5    DULoxetine (CYMBALTA) 60 mg delayed release capsule Take 1 capsule by mouth daily      ipratropium-albuterol (COMBIVENT RESPIMAT) inhaler Inhale 1 puff 4 (four) times a day 4 g 5    ipratropium-albuterol (DUO-NEB) 0 5-2 5 mg/3 mL Inhale 1 each every 4 (four) hours as needed      oxyCODONE-acetaminophen (PERCOCET) 5-325 mg per tablet Take 1 tablet by mouth every 6 (six) hours as needed for moderate pain or severe pain Max Daily Amount: 4 tablets 100 tablet 0    pantoprazole (PROTONIX) 40 mg tablet Take 1 tablet by mouth daily      Tiotropium Bromide-Olodaterol (STIOLTO RESPIMAT) 2 5-2 5 MCG/ACT AERS Inhale 1 puff daily      zolpidem (AMBIEN) 5 mg tablet Take 1 tablet (5 mg total) by mouth daily at bedtime as needed for sleep 30 tablet 5     No current facility-administered medications for this visit        Current Outpatient Prescriptions on File Prior to Visit   Medication Sig    albuterol (2 5 mg/3 mL) 0 083 % nebulizer solution Inhale 1 each    aspirin 81 MG tablet Take by mouth    cyclobenzaprine (FLEXERIL) 10 mg tablet Take by mouth 3 (three) times a day as needed    diclofenac sodium (VOLTAREN) 1 % Apply 2 g topically 4 (four) times a day    DULoxetine (CYMBALTA) 60 mg delayed release capsule Take 1 capsule by mouth daily    ipratropium-albuterol (DUO-NEB) 0 5-2 5 mg/3 mL Inhale 1 each every 4 (four) hours as needed    pantoprazole (PROTONIX) 40 mg tablet Take 1 tablet by mouth daily    Tiotropium Bromide-Olodaterol (STIOLTO RESPIMAT) 2 5-2 5 MCG/ACT AERS Inhale 1 puff daily    zolpidem (AMBIEN) 5 mg tablet Take 1 tablet (5 mg total) by mouth daily at bedtime as needed for sleep    [DISCONTINUED] acetaminophen-codeine (TYLENOL #4) 300-60 MG per tablet Take 1 tablet by mouth every 6 (six) hours as needed for moderate pain    [DISCONTINUED] ALPRAZolam (XANAX) 0 25 mg tablet     [DISCONTINUED] amLODIPine (NORVASC) 5 mg tablet Take 1 tablet by mouth daily    [DISCONTINUED] capsaicin (ZOSTRIX) 0 025 % cream Apply topically    [DISCONTINUED] ipratropium-albuterol (COMBIVENT RESPIMAT) inhaler Inhale    [DISCONTINUED] oxyCODONE-acetaminophen (PERCOCET) 5-325 mg per tablet Take 1 tablet by mouth every 6 (six) hours as needed for moderate pain or severe pain Earliest Fill Date: 6/25/18 Max Daily Amount: 4 tablets     No current facility-administered medications on file prior to visit  He has No Known Allergies       Review of Systems   Constitutional: Negative for fever  Cardiovascular: Positive for chest pain  Gastrointestinal: Negative for abdominal pain and bowel incontinence  Genitourinary: Positive for bladder incontinence and pelvic pain  Negative for dysuria  Musculoskeletal: Positive for back pain  Neurological: Positive for tingling, weakness, headaches and paresthesias  Negative for numbness  Objective:      /70 (BP Location: Right arm, Patient Position: Sitting, Cuff Size: Standard)   Pulse 80   Resp 15   Ht 6' (1 829 m)   Wt 73 9 kg (163 lb)   SpO2 96%   BMI 22 11 kg/m²          Physical Exam   Constitutional: He is oriented to person, place, and time  He appears well-developed and well-nourished  Neck: Normal range of motion  Neck supple  Cardiovascular: Normal rate, regular rhythm, normal heart sounds and intact distal pulses  Pulmonary/Chest: Effort normal and breath sounds normal    Abdominal: Soft  Bowel sounds are normal    Musculoskeletal: Normal range of motion  Neurological: He is alert and oriented to person, place, and time  He has normal reflexes  Skin: Skin is warm and dry  Psychiatric: He has a normal mood and affect  His behavior is normal  Judgment and thought content normal    Nursing note and vitals reviewed

## 2018-07-19 LAB
AMPHETAMINES UR QL SCN: NEGATIVE NG/ML
BARBITURATES UR QL SCN: NEGATIVE NG/ML
BENZODIAZ UR QL SCN: POSITIVE
BZE UR QL: NEGATIVE NG/ML
CANNABINOIDS UR QL SCN: NEGATIVE NG/ML
METHADONE UR QL SCN: NEGATIVE NG/ML
OPIATES UR QL: NEGATIVE
OXYCODONE+OXYMORPHONE SERPLBLD QL SCN: NEGATIVE
OXYCODONE+OXYMORPHONE UR QL SCN: NORMAL NG/ML
OXYCODONE+OXYMORPHONE UR QL SCN: POSITIVE
OXYMORPHONE UR CFM-MCNC: 1551 NG/ML
OXYMORPHONE UR QL CFM: POSITIVE
PCP UR QL: NEGATIVE NG/ML
PROPOXYPH UR QL: NEGATIVE NG/ML

## 2018-07-31 DIAGNOSIS — F41.9 ANXIETY: ICD-10-CM

## 2018-07-31 RX ORDER — ALPRAZOLAM 0.25 MG/1
0.25 TABLET ORAL 3 TIMES DAILY PRN
Qty: 90 TABLET | Refills: 0 | Status: CANCELLED | OUTPATIENT
Start: 2018-07-31

## 2018-08-11 DIAGNOSIS — R52 PAIN: ICD-10-CM

## 2018-08-11 RX ORDER — ACETAMINOPHEN AND CODEINE PHOSPHATE 60; 300 MG/1; MG/1
1 TABLET ORAL EVERY 6 HOURS PRN
Qty: 120 TABLET | Refills: 0 | Status: CANCELLED | OUTPATIENT
Start: 2018-08-11

## 2018-08-12 DIAGNOSIS — I10 ESSENTIAL HYPERTENSION: ICD-10-CM

## 2018-08-12 DIAGNOSIS — J44.9 CHRONIC OBSTRUCTIVE PULMONARY DISEASE, UNSPECIFIED COPD TYPE (HCC): ICD-10-CM

## 2018-08-12 DIAGNOSIS — R52 PAIN: ICD-10-CM

## 2018-08-13 DIAGNOSIS — R52 PAIN: ICD-10-CM

## 2018-08-13 RX ORDER — ACETAMINOPHEN AND CODEINE PHOSPHATE 60; 300 MG/1; MG/1
1 TABLET ORAL EVERY 6 HOURS PRN
Qty: 120 TABLET | Refills: 5 | Status: SHIPPED | OUTPATIENT
Start: 2018-08-13 | End: 2018-12-31 | Stop reason: SDUPTHER

## 2018-08-13 RX ORDER — CYCLOBENZAPRINE HCL 10 MG
10 TABLET ORAL 3 TIMES DAILY PRN
Qty: 30 TABLET | Refills: 5 | Status: SHIPPED | OUTPATIENT
Start: 2018-08-13 | End: 2018-12-31 | Stop reason: SDUPTHER

## 2018-08-13 RX ORDER — AMLODIPINE BESYLATE 5 MG/1
5 TABLET ORAL DAILY
Qty: 30 TABLET | Refills: 5 | Status: SHIPPED | OUTPATIENT
Start: 2018-08-13 | End: 2018-12-31 | Stop reason: SDUPTHER

## 2018-08-13 RX ORDER — CAPSAICIN 0.025 %
1 CREAM (GRAM) TOPICAL 2 TIMES DAILY
Qty: 60 G | Refills: 5 | Status: SHIPPED | OUTPATIENT
Start: 2018-08-13 | End: 2018-11-29

## 2018-08-13 NOTE — TELEPHONE ENCOUNTER
From: Sonali Quinteros  Sent: 8/12/2018 11:11 AM EDT  Subject: Medication Renewal Request    Sonali Quinteros would like a refill of the following medications:     cyclobenzaprine (FLEXERIL) 10 mg tablet Marilu Roman MD]   Patient Comment: Pickup tomorrow     capsaicin (ZOSTRIX) 0 025 % cream Marilu Roman MD]   Patient Comment: Roberto Molina tomorrw     ipratropium-albuterol (COMBIVENT RESPIMAT) inhaler Marilu Roman MD]   Patient Comment: Pickup tomorrow     amLODIPine (NORVASC) 5 mg tablet Marilu Roman MD]   Patient Comment: Pickup tomorrow    Preferred pharmacy: [place] [id number] [facility] : [id number]

## 2018-08-14 DIAGNOSIS — R52 PAIN: ICD-10-CM

## 2018-08-14 RX ORDER — OXYCODONE HYDROCHLORIDE AND ACETAMINOPHEN 5; 325 MG/1; MG/1
1 TABLET ORAL EVERY 6 HOURS PRN
Qty: 100 TABLET | Refills: 0 | Status: SHIPPED | OUTPATIENT
Start: 2018-08-14 | End: 2018-09-11 | Stop reason: SDUPTHER

## 2018-09-04 DIAGNOSIS — J44.9 CHRONIC OBSTRUCTIVE PULMONARY DISEASE, UNSPECIFIED COPD TYPE (HCC): ICD-10-CM

## 2018-09-04 DIAGNOSIS — J44.9 CHRONIC OBSTRUCTIVE PULMONARY DISEASE, UNSPECIFIED COPD TYPE (HCC): Primary | ICD-10-CM

## 2018-09-04 RX ORDER — ALBUTEROL SULFATE 90 UG/1
2 AEROSOL, METERED RESPIRATORY (INHALATION) EVERY 6 HOURS PRN
Qty: 18 G | Refills: 0 | Status: CANCELLED | OUTPATIENT
Start: 2018-09-04

## 2018-09-04 RX ORDER — ALBUTEROL SULFATE 90 UG/1
2 AEROSOL, METERED RESPIRATORY (INHALATION) EVERY 6 HOURS PRN
Qty: 18 G | Refills: 0 | Status: SHIPPED | OUTPATIENT
Start: 2018-09-04 | End: 2018-10-09 | Stop reason: SDUPTHER

## 2018-09-08 DIAGNOSIS — R52 PAIN: ICD-10-CM

## 2018-09-08 RX ORDER — ACETAMINOPHEN AND CODEINE PHOSPHATE 60; 300 MG/1; MG/1
1 TABLET ORAL EVERY 6 HOURS PRN
Qty: 120 TABLET | Refills: 0 | Status: CANCELLED | OUTPATIENT
Start: 2018-09-08

## 2018-09-11 DIAGNOSIS — R52 PAIN: ICD-10-CM

## 2018-09-11 RX ORDER — OXYCODONE HYDROCHLORIDE AND ACETAMINOPHEN 5; 325 MG/1; MG/1
1 TABLET ORAL EVERY 6 HOURS PRN
Qty: 100 TABLET | Refills: 0 | Status: SHIPPED | OUTPATIENT
Start: 2018-09-11 | End: 2018-09-11 | Stop reason: SDUPTHER

## 2018-09-11 RX ORDER — OXYCODONE HYDROCHLORIDE AND ACETAMINOPHEN 5; 325 MG/1; MG/1
1 TABLET ORAL EVERY 6 HOURS PRN
Qty: 100 TABLET | Refills: 0 | Status: SHIPPED | OUTPATIENT
Start: 2018-09-11 | End: 2018-10-08 | Stop reason: SDUPTHER

## 2018-09-18 DIAGNOSIS — R52 PAIN: Primary | ICD-10-CM

## 2018-10-08 DIAGNOSIS — R52 PAIN: ICD-10-CM

## 2018-10-09 DIAGNOSIS — J44.9 CHRONIC OBSTRUCTIVE PULMONARY DISEASE, UNSPECIFIED COPD TYPE (HCC): ICD-10-CM

## 2018-10-09 RX ORDER — OXYCODONE HYDROCHLORIDE AND ACETAMINOPHEN 5; 325 MG/1; MG/1
1 TABLET ORAL EVERY 6 HOURS PRN
Qty: 100 TABLET | Refills: 0 | Status: SHIPPED | OUTPATIENT
Start: 2018-10-09 | End: 2018-10-10 | Stop reason: SDUPTHER

## 2018-10-10 DIAGNOSIS — R52 PAIN: ICD-10-CM

## 2018-10-10 RX ORDER — OXYCODONE HYDROCHLORIDE AND ACETAMINOPHEN 5; 325 MG/1; MG/1
1 TABLET ORAL EVERY 6 HOURS PRN
Qty: 100 TABLET | Refills: 0 | Status: SHIPPED | OUTPATIENT
Start: 2018-10-10 | End: 2018-11-09 | Stop reason: SDUPTHER

## 2018-10-10 RX ORDER — ALBUTEROL SULFATE 90 UG/1
2 AEROSOL, METERED RESPIRATORY (INHALATION) EVERY 6 HOURS PRN
Qty: 18 G | Refills: 5 | Status: SHIPPED | OUTPATIENT
Start: 2018-10-10 | End: 2019-06-27 | Stop reason: SDUPTHER

## 2018-10-18 ENCOUNTER — OFFICE VISIT (OUTPATIENT)
Dept: FAMILY MEDICINE CLINIC | Facility: CLINIC | Age: 63
End: 2018-10-18
Payer: MEDICARE

## 2018-10-18 VITALS
WEIGHT: 165.2 LBS | HEART RATE: 60 BPM | OXYGEN SATURATION: 98 % | BODY MASS INDEX: 22.37 KG/M2 | DIASTOLIC BLOOD PRESSURE: 66 MMHG | HEIGHT: 72 IN | RESPIRATION RATE: 15 BRPM | SYSTOLIC BLOOD PRESSURE: 124 MMHG

## 2018-10-18 DIAGNOSIS — Z13.89 SCREENING FOR NEUROLOGICAL CONDITION: ICD-10-CM

## 2018-10-18 DIAGNOSIS — G89.29 CHRONIC LOW BACK PAIN, UNSPECIFIED BACK PAIN LATERALITY, WITH SCIATICA PRESENCE UNSPECIFIED: ICD-10-CM

## 2018-10-18 DIAGNOSIS — M54.5 CHRONIC LOW BACK PAIN, UNSPECIFIED BACK PAIN LATERALITY, WITH SCIATICA PRESENCE UNSPECIFIED: ICD-10-CM

## 2018-10-18 DIAGNOSIS — J44.9 CHRONIC OBSTRUCTIVE PULMONARY DISEASE, UNSPECIFIED COPD TYPE (HCC): Primary | ICD-10-CM

## 2018-10-18 DIAGNOSIS — R52 PAIN: ICD-10-CM

## 2018-10-18 DIAGNOSIS — I10 BENIGN ESSENTIAL HYPERTENSION: ICD-10-CM

## 2018-10-18 DIAGNOSIS — F41.9 ANXIETY: ICD-10-CM

## 2018-10-18 DIAGNOSIS — G47.00 INSOMNIA, UNSPECIFIED TYPE: ICD-10-CM

## 2018-10-18 PROCEDURE — 99214 OFFICE O/P EST MOD 30 MIN: CPT | Performed by: FAMILY MEDICINE

## 2018-10-18 RX ORDER — ZOLPIDEM TARTRATE 5 MG/1
5 TABLET ORAL
Qty: 30 TABLET | Refills: 0 | Status: SHIPPED | OUTPATIENT
Start: 2018-10-18 | End: 2018-11-30 | Stop reason: SDUPTHER

## 2018-10-18 NOTE — PROGRESS NOTES
Assessment/Plan:    No problem-specific Assessment & Plan notes found for this encounter  Diagnoses and all orders for this visit:    Chronic obstructive pulmonary disease, unspecified COPD type ([address] )    Benign essential hypertension    Pain    Anxiety    Chronic low back pain, unspecified back pain laterality, with sciatica presence unspecified        COPD:  Stable  Strongly advised smoking cessation  HTN:  Stable  Continue current  Chronic pain:  Fairly well controlled  Cont current  Anxiety  Fair control  Continue current  Subjective:      Patient ID: Jenny Valdez is a 70 y o  male  His BP is well controlled on 5 mg of Norvasc  He has no CP or SOB  He has no CP or SOB  He has COPD  He smokes 1/2 PPD  He has no cough, sputum production, or wheezing  He is not ready to quit smoking  He has chronic knee and back pain  He is taking Cymbalta and NSAIDs  He is taking Tylenol #4 for mild to moderate pain and percocet for moderate to severe  He has good pain relief and no side effects  Back Pain   This is a chronic problem  The current episode started more than 1 year ago  The problem occurs constantly  The problem has been gradually worsening since onset  The pain is present in the sacro-iliac  The quality of the pain is described as shooting  The pain radiates to the left knee  The pain is at a severity of 8/10  The pain is the same all the time  The symptoms are aggravated by bending  Stiffness is present all day  Associated symptoms include leg pain, pelvic pain and weight loss  Pertinent negatives include no abdominal pain, bladder incontinence, bowel incontinence, chest pain, dysuria, fever, headaches, numbness, paresis, paresthesias, perianal numbness, tingling or weakness  The following portions of the patient's history were reviewed and updated as appropriate:   He  has a past medical history of Hyperthyroidism    He   Patient Active Problem List Diagnosis Date Noted    Encounter for hepatitis C screening test for low risk patient 04/13/2018    Pain 04/13/2018    Benign essential hypertension 01/15/2018    Back pain, chronic 08/05/2016    Anxiety 11/14/2012    Chronic obstructive pulmonary disease ([facility] [address] ) 11/14/2012     He  has a past surgical history that includes Inguinal hernia repair (Bilateral)  His family history includes Heart disease in his father, maternal grandfather, maternal grandmother, mother, paternal grandfather, and paternal grandmother  He  reports that he has been smoking Cigarettes  He has never used smokeless tobacco  He reports that he does not drink alcohol or use drugs    Current Outpatient Prescriptions   Medication Sig Dispense Refill    acetaminophen-codeine (TYLENOL #4) 300-60 MG per tablet Take 1 tablet by mouth every 6 (six) hours as needed for moderate pain 120 tablet 5    albuterol (2 5 mg/3 mL) 0 083 % nebulizer solution Inhale 1 each      albuterol (VENTOLIN HFA) 90 mcg/act inhaler Inhale 2 puffs every 6 (six) hours as needed for wheezing 18 g 5    ALPRAZolam (XANAX) 0 25 mg tablet Take 1 tablet (0 25 mg total) by mouth 3 (three) times a day as needed for anxiety 90 tablet 5    amLODIPine (NORVASC) 5 mg tablet Take 1 tablet (5 mg total) by mouth daily 30 tablet 5    aspirin 81 MG tablet Take by mouth      capsaicin (ZOSTRIX) 0 025 % cream Apply 1 application topically 2 (two) times a day 60 g 5    cyclobenzaprine (FLEXERIL) 10 mg tablet Take 1 tablet (10 mg total) by mouth 3 (three) times a day as needed (prn pain) 30 tablet 5    DULoxetine (CYMBALTA) 60 mg delayed release capsule Take 1 capsule by mouth daily      ipratropium-albuterol (COMBIVENT RESPIMAT) inhaler Inhale 1 puff 4 (four) times a day 4 g 5    ipratropium-albuterol (DUO-NEB) 0 5-2 5 mg/3 mL Inhale 1 each every 4 (four) hours as needed      oxyCODONE-acetaminophen (PERCOCET) 5-325 mg per tablet Take 1 tablet by mouth every 6 (six) hours as needed for moderate pain or severe pain Earliest Fill Date: 10/10/18 Max Daily Amount: 4 tablets 100 tablet 0    pantoprazole (PROTONIX) 40 mg tablet Take 1 tablet by mouth daily      zolpidem (AMBIEN) 5 mg tablet Take 1 tablet (5 mg total) by mouth daily at bedtime as needed for sleep 30 tablet 5    Tiotropium Bromide-Olodaterol (STIOLTO RESPIMAT) 2 5-2 5 MCG/ACT AERS Inhale 1 puff daily       No current facility-administered medications for this visit        Current Outpatient Prescriptions on File Prior to Visit   Medication Sig    acetaminophen-codeine (TYLENOL #4) 300-60 MG per tablet Take 1 tablet by mouth every 6 (six) hours as needed for moderate pain    albuterol (2 5 mg/3 mL) 0 083 % nebulizer solution Inhale 1 each    albuterol (VENTOLIN HFA) 90 mcg/act inhaler Inhale 2 puffs every 6 (six) hours as needed for wheezing    ALPRAZolam (XANAX) 0 25 mg tablet Take 1 tablet (0 25 mg total) by mouth 3 (three) times a day as needed for anxiety    amLODIPine (NORVASC) 5 mg tablet Take 1 tablet (5 mg total) by mouth daily    aspirin 81 MG tablet Take by mouth    capsaicin (ZOSTRIX) 0 025 % cream Apply 1 application topically 2 (two) times a day    cyclobenzaprine (FLEXERIL) 10 mg tablet Take 1 tablet (10 mg total) by mouth 3 (three) times a day as needed (prn pain)    DULoxetine (CYMBALTA) 60 mg delayed release capsule Take 1 capsule by mouth daily    ipratropium-albuterol (COMBIVENT RESPIMAT) inhaler Inhale 1 puff 4 (four) times a day    ipratropium-albuterol (DUO-NEB) 0 5-2 5 mg/3 mL Inhale 1 each every 4 (four) hours as needed    oxyCODONE-acetaminophen (PERCOCET) 5-325 mg per tablet Take 1 tablet by mouth every 6 (six) hours as needed for moderate pain or severe pain Earliest Fill Date: 10/10/18 Max Daily Amount: 4 tablets    pantoprazole (PROTONIX) 40 mg tablet Take 1 tablet by mouth daily    zolpidem (AMBIEN) 5 mg tablet Take 1 tablet (5 mg total) by mouth daily at bedtime as needed for sleep  Tiotropium Bromide-Olodaterol (STIOLTO RESPIMAT) 2 5-2 5 MCG/ACT AERS Inhale 1 puff daily    [DISCONTINUED] diclofenac sodium (VOLTAREN) 1 % Apply 2 g topically 4 (four) times a day    [DISCONTINUED] VOLTAREN 1 % Apply 4 g topically 4 (four) times a day     No current facility-administered medications on file prior to visit  He has No Known Allergies       Review of Systems   Constitutional: Positive for weight loss  Negative for fever  Cardiovascular: Negative for chest pain  Gastrointestinal: Negative for abdominal pain and bowel incontinence  Genitourinary: Positive for pelvic pain  Negative for bladder incontinence and dysuria  Musculoskeletal: Positive for back pain  Neurological: Negative for tingling, weakness, numbness, headaches and paresthesias  All other systems reviewed and are negative  Objective:      /66 (BP Location: Right arm, Patient Position: Sitting, Cuff Size: Standard)   Pulse 60   Resp 15   Ht 6' (1 829 m)   Wt 74 9 kg (165 lb 3 2 oz)   SpO2 98%   BMI 22 41 kg/m²          Physical Exam   Constitutional: He is oriented to person, place, and time  He appears well-developed and well-nourished  Neck: Normal range of motion  Neck supple  Cardiovascular: Normal rate, regular rhythm, normal heart sounds and intact distal pulses  Pulmonary/Chest: Effort normal    Abdominal: Soft  Bowel sounds are normal    Musculoskeletal: Normal range of motion  Neurological: He is alert and oriented to person, place, and time  He has normal reflexes  Skin: Skin is warm and dry  Psychiatric: He has a normal mood and affect  His behavior is normal  Judgment and thought content normal    Nursing note and vitals reviewed

## 2018-10-18 NOTE — PATIENT INSTRUCTIONS
Cigarette Smoking and Your Health   AMBULATORY CARE:   Risks to your health if you smoke:  Nicotine and other chemicals found in tobacco damage every cell in your body  Even if you are a light smoker, you have an increased risk for cancer, heart disease, and lung disease  If you are pregnant or have diabetes, smoking increases your risk for complications  Benefits to your health if you stop smoking:   · You decrease respiratory symptoms such as coughing, wheezing, and shortness of breath  · You reduce your risk for cancers of the lung, mouth, throat, kidney, bladder, pancreas, stomach, and cervix  If you already have cancer, you increase the benefits of chemotherapy  You also reduce your risk for cancer returning or a second cancer from developing  · You reduce your risk for heart disease, blood clots, heart attack, and stroke  · You reduce your risk for lung infections, and diseases such as pneumonia, asthma, chronic bronchitis, and emphysema  · Your circulation improves  More oxygen can be delivered to your body  If you have diabetes, you lower your risk for complications, such as kidney, artery, and eye diseases  You also lower your risk for nerve damage  Nerve damage can lead to amputations, poor vision, and blindness  · You improve your body's ability to heal and to fight infections  Benefits to the health of others if you stop smoking:  Tobacco is harmful to nonsmokers who breathe in your secondhand smoke  The following are ways the health of others around you may improve when you stop smoking:  · You lower the risks for lung cancer and heart disease in nonsmoking adults  · If you are pregnant, you lower the risk for miscarriage, early delivery, low birth weight, and stillbirth  You also lower your baby's risk for SIDS, obesity, developmental delay, and neurobehavioral problems, such as ADHD       · If you have children, you lower their risk for ear infections, colds, pneumonia, bronchitis, and asthma  For more information and support to stop smoking:   · Smokefree  gov  Phone: 9- 082 - 031-3885  Web Address: www smokefree  gov  Follow up with your healthcare provider as directed:  Write down your questions so you remember to ask them during your visits  © 2017 [employer] Information is for End User's use only and may not be sold, redistributed or otherwise used for commercial purposes  All illustrations and images included in CareNotes® are the copyrighted property of [employer]  or Jeremias Kelly  The above information is an  only  It is not intended as medical advice for individual conditions or treatments  Talk to your doctor, nurse or pharmacist before following any medical regimen to see if it is safe and effective for you  Chronic Bronchitis   WHAT YOU NEED TO KNOW:   Chronic bronchitis is a long-term swelling and irritation in the air passages in your lungs  The irritation may damage your lungs  The lung damage often gets worse over time, and it is usually permanent  Chronic bronchitis is part of a group of lung diseases called chronic obstructive pulmonary disease (COPD)  DISCHARGE INSTRUCTIONS:   Call 911 for any of the following:   · You have new chest pain or tightness  · You become confused, dizzy, or feel like you may faint  · You have a new or increased gray or blue tint to your nail beds, fingers, or lips  Return to the emergency department if:   · You become tired easily from trying to get enough breath  · The amount or color of your sputum changes, or your sputum becomes too hard to cough up  Contact your healthcare provider if:   · You have a fever  · You use your inhalers more often than usual      · You have new or increased swelling in your legs, ankles, or abdomen  · You run out of breath easily when you talk or do your usual exercise or activities       · You have questions or concerns about your condition or care  Medicines: You may  need any of the following:  · Inhalers  help you breathe easier and cough less  An inhaler gives your medicine in a mist form so that you can breathe it into your lungs  Ask your healthcare provider to show you how to use your inhaler correctly  · Steroids  help decrease inflammation in your airway so that you can breathe easier  You may need to go home on oxygen: You may need extra oxygen if your blood oxygen level is lower than it should be  You will be instructed on how to use oxygen in your home  How to use an inhaler:   · Shake the inhaler well to make sure you get the correct amount of medicine per puff  Remove the cover from your inhaler's mouthpiece  If you use a spacer, connect your inhaler to the flat end of the spacer  · Breathe out as much air from your lungs as you can  Put the mouthpiece in your mouth past your front teeth and rest it on the top of your tongue  Do not block the mouthpiece opening with your tongue  · Breathe in through your mouth at a slow and steady rate  As you do this, press the inhaler to release the puff of medicine  Finish breathing in slowly and deeply as you inhale the medicine  When your lungs are full, hold your breath for 10 seconds  Then breathe out slowly through puckered lips or through your nose  · If you need to take more puffs, wait at least 1 minute between each puff  · Rinse your mouth with water after you use the inhaler  This may keep you from getting a mouth infection or irritation  · Follow the instructions that come with your inhaler to clean it  You should clean your inhaler at least once a week  Ways to help you breathe better:   · Take deep breaths and cough  10 times each hour  This will decrease your risk for a lung infection  Take a deep breath and hold it for as long as you can  Let the air out and then cough strongly  Deep breaths help open your airway   You may be given an incentive spirometer to help you take deep breaths  Put the plastic piece in your mouth and take a slow, deep breath  Then let the air out and cough  Repeat these steps 10 times every hour  · Pursed-lip breathing  can be used any time you feel short of breath  Pursed-lip breathing can be especially helpful before you start an activity:     ¨ Breathe in through your nose  Use the muscles in your abdomen to help fill your lungs with air  ¨ Slowly breathe out through your mouth with your lips slightly puckered  You should make a quiet hissing sound as you breathe out  ¨ Try to take twice as long to breathe out as it did to breathe in  This helps you get rid of as much air from your lungs as possible  ¨ Repeat this exercise several times  Once you are used to doing pursed-lip breathing, you can do it any time you need more air  · Diaphragmatic breathing  can help strengthen some of the muscles you use to breathe:     ¨ Place one hand on your stomach just below your ribs  Place your other hand in the middle of your chest over your breastbone  ¨ Breathe in slowly through your nose, as deeply as you can  ¨ Breathe out slowly through pursed lips  As you breathe out, tighten the muscles in your stomach  Use your hand to gently push in and up while tightening the muscles  ¨ Diaphragmatic breathing takes practice  You may need to practice this many times a day  Slowly increase the amount of time you spend during each practice session  Self-care:   · Sleep with your upper body raised  This will help you breathe easier  You can use foam wedges or elevate the head of your bed  Many devices are available to help raise your upper body while in bed  Use a device that will tilt your whole body, or bend your body at the waist  The device should not bend your body at the upper back or neck  · Prevent the spread of germs:      [facility] [place] your hands often with soap and water   Carry germ-killing gel with you  You can use the gel to clean your hands when soap and water are not available  ¨ Do not touch your eyes, nose, or mouth unless you have washed your hands first      ¨ Always cover your mouth when you cough  Cough into a tissue or your shirtsleeve so you do not spread germs from your hands  ¨ Try to avoid people who have a cold or the flu  If you are sick, stay away from others as much as possible  · Do not smoke  Nicotine and other substances can cause lung damage  Do not use e-cigarettes or smokeless tobacco without first talking to your healthcare provider  They still contain nicotine  Ask your healthcare provider for information if you currently smoke and need help to quit  · Avoid lung irritants  Stay out of high altitudes and places with high humidity  Stay inside, or cover your mouth and nose with a scarf when you are outside during cold weather  Stay inside on days when air pollution or pollen counts are high  Do not use aerosol sprays such as deodorant, bug spray, and hair spray  · Drink more liquids  This will help to keep your air passages moist and help you cough up mucus  Ask how much liquid to drink each day and which liquids are best for you  · Ask your healthcare provider about the flu and pneumonia vaccines  All adults should get the flu (influenza) vaccine every year as soon as it becomes available  The pneumonia vaccine is given to adults aged 72 or older to prevent pneumococcal disease, such as pneumonia  Adults aged 23 to 59 years who are at high risk for pneumococcal disease also should get the pneumococcal vaccine  It may need to be repeated 1 or 5 years later  Follow up with your healthcare provider as directed:  Write down your questions so you remember to ask them during your visits  © 2017 [employer] Information is for End User's use only and may not be sold, redistributed or otherwise used for commercial purposes   All illustrations and images included in CareNotes® are the copyrighted property of [employer]  or Jeremias Kelly  The above information is an  only  It is not intended as medical advice for individual conditions or treatments  Talk to your doctor, nurse or pharmacist before following any medical regimen to see if it is safe and effective for you  Chronic Hypertension   AMBULATORY CARE:   Hypertension  is high blood pressure (BP)  Your BP is the force of your blood moving against the walls of your arteries  Normal BP is less than 120/80  Prehypertension is between 120/80 and 139/89  Hypertension is 140/90 or higher  Hypertension causes your BP to get so high that your heart has to work much harder than normal  This can damage your heart  Chronic hypertension is a long-term condition that you can control with a healthy lifestyle or medicines  A controlled blood pressure helps protect your organs, such as your heart, lungs, brain, and kidneys  Common symptoms include the following:   · Headache     · Blurred vision    · Chest pain     · Dizziness or weakness     · Trouble breathing     · Nosebleeds  Call 911 for any of the following:   · You have discomfort in your chest that feels like squeezing, pressure, fullness, or pain  · You become confused or have difficulty speaking  · You suddenly feel lightheaded or have trouble breathing  · You have pain or discomfort in your back, neck, jaw, stomach, or arm  Seek care immediately if:   · You have a severe headache or vision loss  · You have weakness in an arm or leg  Contact your healthcare provider if:   · You feel faint, dizzy, confused, or drowsy  · You have been taking your BP medicine and your BP is still higher than your healthcare provider says it should be  · You have questions or concerns about your condition or care  Treatment for chronic hypertension  may include medicine to lower your BP and lower your cholesterol level   A low cholesterol level helps prevent heart disease and makes it easier to control your blood pressure  Heart disease can make your blood pressure harder to control  You may also need to make lifestyle changes  Take your medicine exactly as directed  Manage chronic hypertension:  Talk with your healthcare provider about these and other ways to manage hypertension:  · Take your BP at home  Sit and rest for 5 minutes before you take your BP  Extend your arm and support it on a flat surface  Your arm should be at the same level as your heart  Follow the directions that came with your BP monitor  If possible, take at least 2 BP readings each time  Take your BP at least twice a day at the same times each day, such as morning and evening  Keep a record of your BP readings and bring it to your follow-up visits  Ask your healthcare provider what your blood pressure should be  · Limit sodium (salt) as directed  Too much sodium can affect your fluid balance  Check labels to find low-sodium or no-salt-added foods  Some low-sodium foods use potassium salts for flavor  Too much potassium can also cause health problems  Your healthcare provider will tell you how much sodium and potassium are safe for you to have in a day  He or she may recommend that you limit sodium to 2,300 mg a day  · Follow the meal plan recommended by your healthcare provider  A dietitian or your provider can give you more information on low-sodium plans or the DASH (Dietary Approaches to Stop Hypertension) eating plan  The DASH plan is low in sodium, unhealthy fats, and total fat  It is high in potassium, calcium, and fiber  · Exercise to maintain a healthy weight  Exercise at least 30 minutes per day, on most days of the week  This will help decrease your blood pressure  Ask about the best exercise plan for you  · Decrease stress  This may help lower your BP   Learn ways to relax, such as deep breathing or listening to music     · Limit alcohol  Women should limit alcohol to 1 drink a day  Men should limit alcohol to 2 drinks a day  A drink of alcohol is 12 ounces of beer, 5 ounces of wine, or 1½ ounces of liquor  · Do not smoke  Nicotine and other chemicals in cigarettes and cigars can increase your BP and also cause lung damage  Ask your healthcare provider for information if you currently smoke and need help to quit  E-cigarettes or smokeless tobacco still contain nicotine  Talk to your healthcare provider before you use these products  Follow up with your healthcare provider as directed: You will need to return to have your BP checked and to have other lab tests done  Write down your questions so you remember to ask them during your visits  © 2017 [employer] Information is for End User's use only and may not be sold, redistributed or otherwise used for commercial purposes  All illustrations and images included in CareNotes® are the copyrighted property of [employer]  or Jeremias Kelly  The above information is an  only  It is not intended as medical advice for individual conditions or treatments  Talk to your doctor, nurse or pharmacist before following any medical regimen to see if it is safe and effective for you

## 2018-10-26 DIAGNOSIS — G89.29 CHRONIC BILATERAL LOW BACK PAIN WITH SCIATICA, SCIATICA LATERALITY UNSPECIFIED: Primary | ICD-10-CM

## 2018-10-26 DIAGNOSIS — G89.4 CHRONIC PAIN SYNDROME: ICD-10-CM

## 2018-10-26 DIAGNOSIS — M54.40 CHRONIC BILATERAL LOW BACK PAIN WITH SCIATICA, SCIATICA LATERALITY UNSPECIFIED: Primary | ICD-10-CM

## 2018-10-30 ENCOUNTER — TELEPHONE (OUTPATIENT)
Dept: FAMILY MEDICINE CLINIC | Facility: CLINIC | Age: 63
End: 2018-10-30

## 2018-10-31 ENCOUNTER — CONSULT (OUTPATIENT)
Dept: PAIN MEDICINE | Facility: CLINIC | Age: 63
End: 2018-10-31
Payer: MEDICARE

## 2018-10-31 VITALS — BODY MASS INDEX: 22.43 KG/M2 | WEIGHT: 165.4 LBS | SYSTOLIC BLOOD PRESSURE: 120 MMHG | DIASTOLIC BLOOD PRESSURE: 70 MMHG

## 2018-10-31 DIAGNOSIS — M54.40 CHRONIC BILATERAL LOW BACK PAIN WITH SCIATICA, SCIATICA LATERALITY UNSPECIFIED: Primary | ICD-10-CM

## 2018-10-31 DIAGNOSIS — M54.12 CERVICAL RADICULOPATHY: ICD-10-CM

## 2018-10-31 DIAGNOSIS — M25.512 CHRONIC PAIN OF BOTH SHOULDERS: ICD-10-CM

## 2018-10-31 DIAGNOSIS — G89.29 CHRONIC PAIN OF BOTH SHOULDERS: ICD-10-CM

## 2018-10-31 DIAGNOSIS — G89.4 CHRONIC PAIN SYNDROME: ICD-10-CM

## 2018-10-31 DIAGNOSIS — G89.29 CHRONIC BILATERAL LOW BACK PAIN WITH SCIATICA, SCIATICA LATERALITY UNSPECIFIED: Primary | ICD-10-CM

## 2018-10-31 DIAGNOSIS — M25.511 CHRONIC PAIN OF BOTH SHOULDERS: ICD-10-CM

## 2018-10-31 PROCEDURE — 99204 OFFICE O/P NEW MOD 45 MIN: CPT | Performed by: ANESTHESIOLOGY

## 2018-10-31 NOTE — PROGRESS NOTES
Assessment:  1  Chronic bilateral low back pain with sciatica, sciatica laterality unspecified    2  Cervical radiculopathy    3  Chronic pain of both shoulders    4  Chronic pain syndrome        Plan:  47-year-old male with complaints of chronic back pain, chronic shoulder and hand pain, left leg pain the history significant for an injury while at work in [employer] presents to the office for initial consultation  At this time patient has no diagnostic imaging  Once we obtain the diagnostic imaging plan interventional therapy accordingly  1  We will obtain an x-ray of the cervical spine to assess neck pain  2  We will obtain an x-ray of bilateral shoulders assess arthritic changes in shoulders that may correlate patient's presentation  3   We will obtain an x-ray of the lumbar spine to assess the causes patient's axial and radicular symptoms  4   Patient may continue opioid pain regimen prescribed by Dr Yeboah  5  We will schedule patient for Raoul based therapy for cervical and lumbar radicular symptoms        There are risks associated with opioid medications, including dependence, addiction and tolerance  The patient understands and agrees to use these medications only as prescribed  Potential side effects of the medications include, but are not limited to, constipation, drowsiness, addiction, impaired judgment and risk of fatal overdose if not taken as prescribed  The patient was warned against driving while taking sedation medications  Sharing medications is a felony  At this point in time, the patient is showing no signs of addiction, abuse, diversion or suicidal ideation  [place] Prescription Drug Monitoring Program report was reviewed and was appropriate     Complete risks and benefits including bleeding, infection, tissue reaction, nerve injury and allergic reaction were discussed  The approach was demonstrated using models and literature was provided   Verbal and written consent was obtained  My impressions and treatment recommendations were discussed in detail with the patient who verbalized understanding and had no further questions  Discharge instructions were provided  I personally saw and examined the patient and I agree with the above discussed plan of care  No orders of the defined types were placed in this encounter  No orders of the defined types were placed in this encounter  History of Present Illness:    Mary Richey is a 70 y o  male with complaints of chronic back pain, chronic shoulder and hand pain, left leg pain the history significant for an injury while at work in 1965 presents to the office for initial consultation  Patient reports his pain is 8/10, constant, worse in the morning  Patient describes his pain as dull/aching, throbbing, shooting with no associated weakness and left upper or lower extremities  Pain is increased by lying down, standing, bending, sitting, walking  Nothing decreases pain nor does anything changes pain  Patient does not require cane or walker for gait assistance  Patient denies any bowel or bladder incontinence  Patient reported doing physical therapy which provided him with no relief  Patient also has tried heat/ice treatment which provides with moderate relief  Patient currently taking Percocet 5/325 q i d  and Tylenol No 4 BID for pain control  Patient reports this relieves his pain some of the time  I have personally reviewed and/or updated the patient's past medical history, past surgical history, family history, social history, current medications, allergies, and vital signs today  Review of Systems:    Review of Systems   Constitutional: Positive for unexpected weight change  Respiratory: Positive for shortness of breath and wheezing  Gastrointestinal: Positive for diarrhea  Musculoskeletal: Positive for arthralgias and gait problem          Joint stiffness, pain low back   Psychiatric/Behavioral: Depression   All other systems reviewed and are negative  Patient Active Problem List   Diagnosis    Encounter for hepatitis C screening test for low risk patient    Pain    Anxiety    Back pain, chronic    Benign essential hypertension    Chronic obstructive pulmonary disease ([facility] [address] )    Screening for neurological condition       Past Medical History:   Diagnosis Date    Hyperthyroidism     last assessed: 10/28/16       Past Surgical History:   Procedure Laterality Date    INGUINAL HERNIA REPAIR Bilateral        Family History   Problem Relation Age of Onset    Heart disease Mother         cardiac disorder    Heart disease Father         cardiac disorder    Heart disease Maternal Grandmother         cardiac disorder    Heart disease Maternal Grandfather         cardiac disorder    Heart disease Paternal Grandmother         cardiac disorder    Heart disease Paternal Grandfather         cardiac disorder       Social History     Occupational History    Not on file       Social History Main Topics    Smoking status: Current Every Day Smoker     Types: Cigarettes    Smokeless tobacco: Never Used    Alcohol use No    Drug use: No    Sexual activity: Not on file       Current Outpatient Prescriptions on File Prior to Visit   Medication Sig    acetaminophen-codeine (TYLENOL #4) 300-60 MG per tablet Take 1 tablet by mouth every 6 (six) hours as needed for moderate pain    albuterol (2 5 mg/3 mL) 0 083 % nebulizer solution Inhale 1 each    albuterol (VENTOLIN HFA) 90 mcg/act inhaler Inhale 2 puffs every 6 (six) hours as needed for wheezing    ALPRAZolam (XANAX) 0 25 mg tablet Take 1 tablet (0 25 mg total) by mouth 3 (three) times a day as needed for anxiety    amLODIPine (NORVASC) 5 mg tablet Take 1 tablet (5 mg total) by mouth daily    aspirin 81 MG tablet Take by mouth    cyclobenzaprine (FLEXERIL) 10 mg tablet Take 1 tablet (10 mg total) by mouth 3 (three) times a day as needed (prn pain)  DULoxetine (CYMBALTA) 60 mg delayed release capsule Take 1 capsule by mouth daily    ipratropium-albuterol (COMBIVENT RESPIMAT) inhaler Inhale 1 puff 4 (four) times a day    ipratropium-albuterol (DUO-NEB) 0 5-2 5 mg/3 mL Inhale 1 each every 4 (four) hours as needed    oxyCODONE-acetaminophen (PERCOCET) 5-325 mg per tablet Take 1 tablet by mouth every 6 (six) hours as needed for moderate pain or severe pain Earliest Fill Date: 10/10/18 Max Daily Amount: 4 tablets    pantoprazole (PROTONIX) 40 mg tablet Take 1 tablet by mouth daily    Tiotropium Bromide-Olodaterol (STIOLTO RESPIMAT) 2 5-2 5 MCG/ACT AERS Inhale 1 puff daily    zolpidem (AMBIEN) 5 mg tablet Take 1 tablet (5 mg total) by mouth daily at bedtime as needed for sleep    capsaicin (ZOSTRIX) 0 025 % cream Apply 1 application topically 2 (two) times a day (Patient not taking: Reported on 10/31/2018 )     No current facility-administered medications on file prior to visit  No Known Allergies    Physical Exam:    /70   Wt 75 kg (165 lb 6 4 oz)   BMI 22 43 kg/m²     Constitutional: normal, well developed, well nourished, alert, in no distress and non-toxic and no overt pain behavior  Eyes: anicteric  HEENT: grossly intact  Neck: supple, symmetric, trachea midline and no masses   Pulmonary:even and unlabored  Cardiovascular:No edema or pitting edema present  Skin:Normal without rashes or lesions and well hydrated  Psychiatric:Mood and affect appropriate  Neurologic:Cranial Nerves II-XII grossly intact  Musculoskeletal:normal     Cervical Spine examination demonstrates  Decreased ROM secondary to pain with lateral rotation to the left/right and bending to the left/right, in addition to neck flexion  5/5 upper extremity strength in all muscle groups bilaterally  Negative Spurling's maneuver to the b/l Ue, sensitivity to light touch intact b/l Ue  Thoracic Spine examination demonstrates   Bilateral thoracic paraspinal musculature tender to palpation with muscle spasms noted throughout her paraspinals  Lumbar/Sacral Spine examination demonstrates  Decreased range of motion lumbar spine with pain upon: flexion, lateral rotation to the left/right, and bending to the left/right  Bilateral lumbar paraspinals tender to palpation  Muscle spasms noted in the lumbar area bilaterally  5/5 lower extremity strength in all muscle groups bilaterally  Positive seated straight leg raise for bilateral lower extremities  Sensitivity to light touch intact bilateral lower extremities  4+ reflexes in the patella and Achilles    No ankle clonus     Imaging

## 2018-11-02 ENCOUNTER — APPOINTMENT (OUTPATIENT)
Dept: RADIOLOGY | Facility: MEDICAL CENTER | Age: 63
End: 2018-11-02
Payer: MEDICARE

## 2018-11-02 DIAGNOSIS — M54.12 CERVICAL RADICULOPATHY: ICD-10-CM

## 2018-11-02 DIAGNOSIS — G89.29 CHRONIC PAIN OF BOTH SHOULDERS: ICD-10-CM

## 2018-11-02 DIAGNOSIS — M25.511 CHRONIC PAIN OF BOTH SHOULDERS: ICD-10-CM

## 2018-11-02 DIAGNOSIS — M54.40 CHRONIC BILATERAL LOW BACK PAIN WITH SCIATICA, SCIATICA LATERALITY UNSPECIFIED: ICD-10-CM

## 2018-11-02 DIAGNOSIS — M25.512 CHRONIC PAIN OF BOTH SHOULDERS: ICD-10-CM

## 2018-11-02 DIAGNOSIS — G89.29 CHRONIC BILATERAL LOW BACK PAIN WITH SCIATICA, SCIATICA LATERALITY UNSPECIFIED: ICD-10-CM

## 2018-11-02 PROCEDURE — 72110 X-RAY EXAM L-2 SPINE 4/>VWS: CPT

## 2018-11-02 PROCEDURE — 73030 X-RAY EXAM OF SHOULDER: CPT

## 2018-11-02 PROCEDURE — 72050 X-RAY EXAM NECK SPINE 4/5VWS: CPT

## 2018-11-05 ENCOUNTER — TELEPHONE (OUTPATIENT)
Dept: PAIN MEDICINE | Facility: CLINIC | Age: 63
End: 2018-11-05

## 2018-11-05 NOTE — TELEPHONE ENCOUNTER
----- Message from Dang Burgos MD sent at 11/5/2018  7:02 AM EST -----  Please call the patient regarding his abnormal result    X-ray lumbar spine shows evidence of decreased bone density, arthritic changes throughout entire spine with mild facet arthritis at L3-L4, L4-5, L5-S1

## 2018-11-05 NOTE — TELEPHONE ENCOUNTER
S/w the patient and reviewed the results with him  He does not have an ovs scheduled  Would you like him to be seen in the office to discuss treatment options? RM to advise   thanks

## 2018-11-05 NOTE — TELEPHONE ENCOUNTER
----- Message from Nelson Geiegr MD sent at 11/5/2018  7:01 AM EST -----  Please call the patient regarding his abnormal result    X-rays cervical spine shows moderate degenerative disc changes and neural foraminal narrowing at C5-C6 which may correlate with an upper extremity radicular symptoms

## 2018-11-05 NOTE — TELEPHONE ENCOUNTER
----- Message from Agata Rios MD sent at 11/5/2018  7:00 AM EST -----  Please call the patient regarding his abnormal result    X-ray of bilateral shoulders shows mild degenerative arthritis

## 2018-11-05 NOTE — TELEPHONE ENCOUNTER
Yes please schedule f/u visit   He should have scheduled a f/u before leaving the office at [address] [place] [address]

## 2018-11-09 DIAGNOSIS — R52 PAIN: ICD-10-CM

## 2018-11-12 RX ORDER — OXYCODONE HYDROCHLORIDE AND ACETAMINOPHEN 5; 325 MG/1; MG/1
1 TABLET ORAL EVERY 6 HOURS PRN
Qty: 100 TABLET | Refills: 0 | Status: SHIPPED | OUTPATIENT
Start: 2018-11-12 | End: 2018-12-12 | Stop reason: SDUPTHER

## 2018-11-17 DIAGNOSIS — G47.00 INSOMNIA, UNSPECIFIED TYPE: ICD-10-CM

## 2018-11-17 RX ORDER — ZOLPIDEM TARTRATE 5 MG/1
5 TABLET ORAL
Qty: 30 TABLET | Refills: 0 | Status: CANCELLED | OUTPATIENT
Start: 2018-11-17

## 2018-11-21 ENCOUNTER — EVALUATION (OUTPATIENT)
Dept: PHYSICAL THERAPY | Facility: CLINIC | Age: 63
End: 2018-11-21
Payer: MEDICARE

## 2018-11-21 DIAGNOSIS — M54.12 CERVICAL RADICULOPATHY: ICD-10-CM

## 2018-11-21 DIAGNOSIS — G89.29 CHRONIC BILATERAL LOW BACK PAIN WITH SCIATICA, SCIATICA LATERALITY UNSPECIFIED: Primary | ICD-10-CM

## 2018-11-21 DIAGNOSIS — M54.40 CHRONIC BILATERAL LOW BACK PAIN WITH SCIATICA, SCIATICA LATERALITY UNSPECIFIED: Primary | ICD-10-CM

## 2018-11-21 PROCEDURE — 97163 PT EVAL HIGH COMPLEX 45 MIN: CPT | Performed by: PHYSICAL THERAPIST

## 2018-11-21 PROCEDURE — G8990 OTHER PT/OT CURRENT STATUS: HCPCS | Performed by: PHYSICAL THERAPIST

## 2018-11-21 PROCEDURE — G8991 OTHER PT/OT GOAL STATUS: HCPCS | Performed by: PHYSICAL THERAPIST

## 2018-11-21 NOTE — PROGRESS NOTES
PT Evaluation     Today's date: 2018  Patient name: Danita Mcgrath  : 1955  MRN: [id number]  Referring provider: Hitesh Ortiz MD  Dx:   Encounter Diagnosis     ICD-10-CM    1  Chronic bilateral low back pain with sciatica, sciatica laterality unspecified M54 40 Ambulatory referral to Physical Therapy    G89 29    2  Cervical radiculopathy M54 12 Ambulatory referral to Physical Therapy                  Assessment  Assessment details: 1) Decreased LE and UE strength- Therapeutic exercise  2) Decreased core stability- Therapeutic exercise  3) Limited LE flexibility- Manual Stretching  4) Decreased postural awareness- Postural training  5) Decreased ROM- Therapeutic exercise  6) Patient education and HEP  Impairments: abnormal or restricted ROM, activity intolerance, impaired balance, impaired physical strength, lacks appropriate home exercise program, pain with function and poor posture     Goals  ST  Patient to report decreased pain by 25% at worst in 4 weeks  2  Initiate HEP    LT  Patient to report decreased pain by 50% at worst in 8 weeks  2  Patient to increase B LE and UE strength to 4+-5/5 in 8 weeks  3   Patient to improve LE flexibility to [facility] in 8 weeks    Plan  Patient would benefit from: skilled physical therapy  Planned modality interventions: thermotherapy: hydrocollator packs  Planned therapy interventions: abdominal trunk stabilization, manual therapy, massage, balance, neuromuscular re-education, patient education, postural training, strengthening, stretching, therapeutic activities, therapeutic exercise, therapeutic training, functional ROM exercises, flexibility, graded activity, graded exercise and home exercise program  Frequency: 3x week  Duration in visits: 12  Duration in weeks: 4  Treatment plan discussed with: patient        Subjective Evaluation    History of Present Illness  Mechanism of injury: Patient presents to PT with c/o pain in low back and cervical spine  Patient has had chronic low back pain for many years and has had therapy 2 years ago with some relief while performing PT  Patient reports the pain has continued and he has developed neck pain  Patient had x-rays done and reports he has arthritis  Patient also reports he gets sciatica down into left LE  He denies paresthesias in B UE and LE  Patient reports the pain is worst in the morning when he wakes up and reports difficulty with lifting objects  He reports his neck pain feels like a stiffness and also reports symptoms into his left shoulder  Patient RTD next week and is now referred to oppt  Pain  Current pain ratin  At best pain ratin  At worst pain ratin  Quality: knife-like  Relieving factors: medications    Patient Goals  Patient goals for therapy: decreased pain          Objective     Palpation   Left   Muscle spasm in the cervical paraspinals and lumbar paraspinals  Tenderness of the cervical paraspinals and lumbar paraspinals  Right   Muscle spasm in the cervical paraspinals and lumbar paraspinals  Tenderness of the cervical paraspinals and lumbar paraspinals  Neurological Testing     Sensation   Cervical/Thoracic   Left   Intact: light touch    Right   Intact: light touch    Lumbar   Left   Intact: light touch    Right   Intact: light touch    Active Range of Motion     Additional Active Range of Motion Details  Cervical flexion WFL   Otherwise cervical rom limited 50%  Lumbar rom limited 50% t/o    Strength/Myotome Testing     Left Shoulder     Planes of Motion   Flexion: 4+   Abduction: 4   External rotation at 0°: 4+   Internal rotation at 0°: 4+     Right Shoulder     Planes of Motion   Flexion: 4+   Abduction: 4+   External rotation at 0°: 4+   Internal rotation at 0°: 4+     Left Hip   Planes of Motion   Flexion: 4-  Abduction: 4-  Adduction: 4-    Right Hip   Planes of Motion   Flexion: 4  Abduction: 4  Adduction: 4    Left Knee   Flexion: 4-  Extension: 4-    Right Knee   Flexion: 4  Extension: 4    Left Ankle/Foot   Dorsiflexion: 4-    Right Ankle/Foot   Dorsiflexion: 4    Tests   Cervical   Positive repeated extension and repeated flexion  Left   Negative Spurling's sign  Right   Negative Spurling's sign  Lumbar   Positive repeated extension  Negative repeated flexion  Additional Tests Details  Significant tightness noted t/o B hamstring and calf muscles with SLR   No lumbar symptoms reported with SLR      Flowsheet Rows      Most Recent Value   PT/OT G-Codes   Current Score  46   Projected Score  57          Precautions: None    Daily Treatment Diary     Manual  11/21            Manual LE Stretch 15 min                                                                    Exercise Diary  11/21            Nustep             TR/HR             Standing LSR 3-way             Shrugs             Retractions             Bicep Curls             MTP/LTP             Standing trunk Extension             PPT             Bridges             VIJAYA                                                                                                                                      Modalities  11/21            [facility]

## 2018-11-26 ENCOUNTER — OFFICE VISIT (OUTPATIENT)
Dept: PHYSICAL THERAPY | Facility: CLINIC | Age: 63
End: 2018-11-26
Payer: MEDICARE

## 2018-11-26 DIAGNOSIS — M54.40 CHRONIC BILATERAL LOW BACK PAIN WITH SCIATICA, SCIATICA LATERALITY UNSPECIFIED: Primary | ICD-10-CM

## 2018-11-26 DIAGNOSIS — G89.29 CHRONIC BILATERAL LOW BACK PAIN WITH SCIATICA, SCIATICA LATERALITY UNSPECIFIED: Primary | ICD-10-CM

## 2018-11-26 PROCEDURE — 97110 THERAPEUTIC EXERCISES: CPT | Performed by: PHYSICAL THERAPIST

## 2018-11-26 PROCEDURE — 97140 MANUAL THERAPY 1/> REGIONS: CPT | Performed by: PHYSICAL THERAPIST

## 2018-11-26 PROCEDURE — G8991 OTHER PT/OT GOAL STATUS: HCPCS | Performed by: PHYSICAL THERAPIST

## 2018-11-26 PROCEDURE — G8992 OTHER PT/OT  D/C STATUS: HCPCS | Performed by: PHYSICAL THERAPIST

## 2018-11-26 NOTE — PROGRESS NOTES
Daily Note     Today's date: 2018  Patient name: Audi Evans  : 1955  MRN: [id number]  Referring provider: Barbara Irizarry MD  Dx:   Encounter Diagnosis     ICD-10-CM    1  Chronic bilateral low back pain with sciatica, sciatica laterality unspecified M54 40     G89 29                   Subjective: Patient states "I can feel when it is going to rain  I take a percocet in the morning when it is the worst"  Objective: See treatment diary below  Precautions: None     Daily Treatment Diary      Manual                     Manual LE Stretch 15 min 15 min                                                                                                                         Exercise Diary                     Nustep   L3 10 min                   TR/HR   3x10                   Standing SLR 3-way   2x10 B, each                   Shrugs   2# 2x10                   Retractions   2# 2x10                   Bicep Curls   2# 2x10                   MTP/LTP   RTB 2x10                   Standing trunk Extension   15x                   PPT   2x10                   Bridges                       VIJAYA                                                                                                                                                                                                                                                     Modalities                     MHP    Pt declined                                                                         Assessment: Patient with good tolerance to first treatment today  Minimal VC's required for correct performance of TE  Significant tightness noted t/o B LE with manual therapy  Plan: Continue per plan of care

## 2018-11-28 ENCOUNTER — APPOINTMENT (OUTPATIENT)
Dept: PHYSICAL THERAPY | Facility: CLINIC | Age: 63
End: 2018-11-28
Payer: MEDICARE

## 2018-11-29 ENCOUNTER — OFFICE VISIT (OUTPATIENT)
Dept: PAIN MEDICINE | Facility: CLINIC | Age: 63
End: 2018-11-29
Payer: MEDICARE

## 2018-11-29 VITALS
BODY MASS INDEX: 21.67 KG/M2 | HEIGHT: 72 IN | TEMPERATURE: 98.6 F | DIASTOLIC BLOOD PRESSURE: 80 MMHG | WEIGHT: 160 LBS | RESPIRATION RATE: 16 BRPM | SYSTOLIC BLOOD PRESSURE: 114 MMHG

## 2018-11-29 DIAGNOSIS — M79.18 MYOFASCIAL PAIN SYNDROME: ICD-10-CM

## 2018-11-29 DIAGNOSIS — M50.30 DEGENERATIVE DISC DISEASE, CERVICAL: ICD-10-CM

## 2018-11-29 DIAGNOSIS — M25.562 CHRONIC PAIN OF LEFT KNEE: ICD-10-CM

## 2018-11-29 DIAGNOSIS — M47.816 LUMBAR SPONDYLOSIS: ICD-10-CM

## 2018-11-29 DIAGNOSIS — G89.29 CHRONIC PAIN OF LEFT KNEE: ICD-10-CM

## 2018-11-29 DIAGNOSIS — M54.16 LUMBAR RADICULOPATHY: Primary | ICD-10-CM

## 2018-11-29 PROCEDURE — 99214 OFFICE O/P EST MOD 30 MIN: CPT | Performed by: ANESTHESIOLOGY

## 2018-11-29 NOTE — PROGRESS NOTES
Pt c/o back pain    Assessment:  1  Lumbar radiculopathy - Left    2  Degenerative disc disease, cervical    3  Lumbar spondylosis    4  Myofascial pain syndrome    5  Chronic pain of left knee        Plan:  77-year-old male with complaints of chronic back pain, chronic shoulder and hand pain, left leg pain the history significant for an injury while at work in [place] presents to the office for follow-up visit  X-ray lumbar spine shows mild degenerative facet arthropathy at L3-4, L4-5, L5-S1  X-rays cervical spine shows moderate degenerative disc disease and mild bilateral foraminal stenosis at C5-C6  X-rays of bilateral shoulder shows mild degenerative changes  Patient is engaged in physical therapy for his back and his left leg however he does note that there isn't significant changes in his current symptoms  The patient does exhibit some radiculopathy in the left L4 and L5 nerve root distribution  1  We will obtain an MRI of the lumbar spine based on those results we will consider doing a transforaminal epidural steroid injection for left lower extremity radiculopathy versus a medial branch block for the arthritic condition  2  Patient may continue opioid pain regimen prescribed by Dr Yeboah  3  We will schedule patient for Raoul based therapy for cervical and lumbar radicular symptoms           There are risks associated with opioid medications, including dependence, addiction and tolerance  The patient understands and agrees to use these medications only as prescribed  Potential side effects of the medications include, but are not limited to, constipation, drowsiness, addiction, impaired judgment and risk of fatal overdose if not taken as prescribed  The patient was warned against driving while taking sedation medications  Sharing medications is a felony  At this point in time, the patient is showing no signs of addiction, abuse, diversion or suicidal ideation          [place] Prescription Drug Monitoring Program report was reviewed and was appropriate     Complete risks and benefits including bleeding, infection, tissue reaction, nerve injury and allergic reaction were discussed  The approach was demonstrated using models and literature was provided  Verbal and written consent was obtained  My impressions and treatment recommendations were discussed in detail with the patient who verbalized understanding and had no further questions  Discharge instructions were provided  I personally saw and examined the patient and I agree with the above discussed plan of care  No orders of the defined types were placed in this encounter  No orders of the defined types were placed in this encounter  History of Present Illness:  Mabel Guadalupe is a 70 y o  male who presents for a follow up office visit in regards to Back Pain  The patients current symptoms include 3/10 constant throbbing pain in the midback and low back which is worse in the morning  Current pain medications includes:  Percocet 5/325, Flexeril, duloxetine  The patient reports that this regimen is providing 75% pain relief  The patient is reporting no side effects from this pain medication regimen  I have personally reviewed and/or updated the patient's past medical history, past surgical history, family history, social history, current medications, allergies, and vital signs today  Review of Systems   Respiratory: Positive for shortness of breath  Cardiovascular: Negative for chest pain  Gastrointestinal: Negative for constipation, diarrhea, nausea and vomiting  Musculoskeletal: Positive for gait problem  Negative for arthralgias, joint swelling and myalgias  Joint stiffnes   Skin: Negative for rash  Neurological: Negative for dizziness, seizures and weakness  All other systems reviewed and are negative        Patient Active Problem List   Diagnosis    Encounter for hepatitis C screening test for low risk patient    Pain    Anxiety    Back pain, chronic    Benign essential hypertension    Chronic obstructive pulmonary disease (HCC)    Screening for neurological condition       Past Medical History:   Diagnosis Date    Hyperthyroidism     last assessed: 10/28/16       Past Surgical History:   Procedure Laterality Date    INGUINAL HERNIA REPAIR Bilateral        Family History   Problem Relation Age of Onset    Heart disease Mother         cardiac disorder    Heart disease Father         cardiac disorder    Heart disease Maternal Grandmother         cardiac disorder    Heart disease Maternal Grandfather         cardiac disorder    Heart disease Paternal Grandmother         cardiac disorder    Heart disease Paternal Grandfather         cardiac disorder       Social History     Occupational History    Not on file       Social History Main Topics    Smoking status: Current Every Day Smoker     Types: Cigarettes    Smokeless tobacco: Never Used    Alcohol use No    Drug use: No    Sexual activity: Not on file       Current Outpatient Prescriptions on File Prior to Visit   Medication Sig    acetaminophen-codeine (TYLENOL #4) 300-60 MG per tablet Take 1 tablet by mouth every 6 (six) hours as needed for moderate pain    albuterol (2 5 mg/3 mL) 0 083 % nebulizer solution Inhale 1 each    albuterol (VENTOLIN HFA) 90 mcg/act inhaler Inhale 2 puffs every 6 (six) hours as needed for wheezing    ALPRAZolam (XANAX) 0 25 mg tablet Take 1 tablet (0 25 mg total) by mouth 3 (three) times a day as needed for anxiety    amLODIPine (NORVASC) 5 mg tablet Take 1 tablet (5 mg total) by mouth daily    aspirin 81 MG tablet Take by mouth    capsaicin (ZOSTRIX) 0 025 % cream Apply 1 application topically 2 (two) times a day (Patient not taking: Reported on 10/31/2018 )    cyclobenzaprine (FLEXERIL) 10 mg tablet Take 1 tablet (10 mg total) by mouth 3 (three) times a day as needed (prn pain)    diclofenac sodium (VOLTAREN) 1 % Apply 2 g topically 4 (four) times a day    DULoxetine (CYMBALTA) 60 mg delayed release capsule Take 1 capsule by mouth daily    ipratropium-albuterol (COMBIVENT RESPIMAT) inhaler Inhale 1 puff 4 (four) times a day    ipratropium-albuterol (DUO-NEB) 0 5-2 5 mg/3 mL Inhale 1 each every 4 (four) hours as needed    oxyCODONE-acetaminophen (PERCOCET) 5-325 mg per tablet Take 1 tablet by mouth every 6 (six) hours as needed for moderate pain or severe pain Earliest Fill Date: 11/12/18 Max Daily Amount: 4 tablets    pantoprazole (PROTONIX) 40 mg tablet Take 1 tablet by mouth daily    Tiotropium Bromide-Olodaterol (STIOLTO RESPIMAT) 2 5-2 5 MCG/ACT AERS Inhale 1 puff daily    zolpidem (AMBIEN) 5 mg tablet Take 1 tablet (5 mg total) by mouth daily at bedtime as needed for sleep     No current facility-administered medications on file prior to visit  No Known Allergies    Physical Exam:    There were no vitals taken for this visit  Constitutional:normal, well developed, well nourished, alert, in no distress and non-toxic and no overt pain behavior  Eyes:anicteric  HEENT:grossly intact  Neck:supple, symmetric, trachea midline and no masses   Pulmonary:even and unlabored  Cardiovascular:No edema or pitting edema present  Skin:Normal without rashes or lesions and well hydrated  Psychiatric:Mood and affect appropriate  Neurologic:Cranial Nerves II-XII grossly intact  Musculoskeletal:normal       Cervical Spine examination demonstrates  Decreased ROM secondary to pain with lateral rotation to the left/right and bending to the left/right, in addition to neck flexion  5/5 upper extremity strength in all muscle groups bilaterally  Negative Spurling's maneuver to the b/l Ue, sensitivity to light touch intact b/l Ue  Thoracic Spine examination demonstrates  Bilateral thoracic paraspinal musculature tender to palpation with muscle spasms noted throughout her paraspinals       Lumbar/Sacral Spine examination demonstrates  Decreased range of motion lumbar spine with pain upon: flexion, lateral rotation to the left/right, and bending to the left/right  Bilateral lumbar paraspinals tender to palpation  Muscle spasms noted in the lumbar area bilaterally  3/5 left lower extremity strength in all muscle groups knee extension  Positive seated straight leg raise for left lower extremity  Sensitivity to light touch intact bilateral lower extremities  4+ reflexes in the patella and Achilles    No ankle clonus     Imaging

## 2018-11-30 ENCOUNTER — APPOINTMENT (OUTPATIENT)
Dept: PHYSICAL THERAPY | Facility: CLINIC | Age: 63
End: 2018-11-30
Payer: MEDICARE

## 2018-11-30 DIAGNOSIS — G47.00 INSOMNIA, UNSPECIFIED TYPE: ICD-10-CM

## 2018-12-03 RX ORDER — ZOLPIDEM TARTRATE 5 MG/1
5 TABLET ORAL
Qty: 30 TABLET | Refills: 5 | Status: SHIPPED | OUTPATIENT
Start: 2018-12-03 | End: 2019-06-03 | Stop reason: SDUPTHER

## 2018-12-05 ENCOUNTER — HOSPITAL ENCOUNTER (OUTPATIENT)
Dept: MRI IMAGING | Facility: HOSPITAL | Age: 63
Discharge: HOME | End: 2018-12-05
Attending: ANESTHESIOLOGY
Payer: MEDICARE

## 2018-12-05 DIAGNOSIS — M54.16 LUMBAR RADICULOPATHY: ICD-10-CM

## 2018-12-05 PROCEDURE — 72148 MRI LUMBAR SPINE W/O DYE: CPT

## 2018-12-06 ENCOUNTER — TELEPHONE (OUTPATIENT)
Dept: FAMILY MEDICINE CLINIC | Facility: CLINIC | Age: 63
End: 2018-12-06

## 2018-12-07 ENCOUNTER — TELEPHONE (OUTPATIENT)
Dept: PAIN MEDICINE | Facility: CLINIC | Age: 63
End: 2018-12-07

## 2018-12-07 NOTE — TELEPHONE ENCOUNTER
----- Message from Dang Burgos MD sent at 12/7/2018  1:21 PM EST -----  Please call the patient regarding his abnormal result    MRI lumbar spine shows 2 tiny disc bulges at L3-L4 and L4-5 with arthritic changes at L3-L4, L4-5, L5-S1 without any significant nerve involvement

## 2018-12-07 NOTE — TELEPHONE ENCOUNTER
We can perform medial branch blocks in the lumbar spine which would alleviate the arthritic sharp stabbing pain   But we should discuss that at [facility] in detail

## 2018-12-12 DIAGNOSIS — R52 PAIN: ICD-10-CM

## 2018-12-12 RX ORDER — OXYCODONE HYDROCHLORIDE AND ACETAMINOPHEN 5; 325 MG/1; MG/1
1 TABLET ORAL EVERY 6 HOURS PRN
Qty: 100 TABLET | Refills: 0 | Status: SHIPPED | OUTPATIENT
Start: 2018-12-12 | End: 2019-01-11 | Stop reason: SDUPTHER

## 2018-12-14 ENCOUNTER — OFFICE VISIT (OUTPATIENT)
Dept: PAIN MEDICINE | Facility: CLINIC | Age: 63
End: 2018-12-14
Payer: MEDICARE

## 2018-12-14 VITALS
DIASTOLIC BLOOD PRESSURE: 76 MMHG | WEIGHT: 163 LBS | SYSTOLIC BLOOD PRESSURE: 118 MMHG | BODY MASS INDEX: 22.08 KG/M2 | HEART RATE: 72 BPM | HEIGHT: 72 IN

## 2018-12-14 DIAGNOSIS — G89.29 CHRONIC BILATERAL LOW BACK PAIN WITHOUT SCIATICA: Primary | ICD-10-CM

## 2018-12-14 DIAGNOSIS — M51.26 BULGING OF LUMBAR INTERVERTEBRAL DISC: ICD-10-CM

## 2018-12-14 DIAGNOSIS — M54.50 CHRONIC BILATERAL LOW BACK PAIN WITHOUT SCIATICA: Primary | ICD-10-CM

## 2018-12-14 DIAGNOSIS — M47.816 LUMBAR SPONDYLOSIS: ICD-10-CM

## 2018-12-14 PROBLEM — M25.361 OTHER INSTABILITY, RIGHT KNEE: Status: ACTIVE | Noted: 2017-10-18

## 2018-12-14 PROBLEM — M51.36 BULGING OF LUMBAR INTERVERTEBRAL DISC: Status: ACTIVE | Noted: 2018-12-14

## 2018-12-14 PROBLEM — M17.0 OSTEOARTHRITIS OF BOTH KNEES: Status: ACTIVE | Noted: 2017-10-18

## 2018-12-14 PROBLEM — M51.369 BULGING OF LUMBAR INTERVERTEBRAL DISC: Status: ACTIVE | Noted: 2018-12-14

## 2018-12-14 PROCEDURE — 99213 OFFICE O/P EST LOW 20 MIN: CPT | Performed by: NURSE PRACTITIONER

## 2018-12-14 NOTE — PROGRESS NOTES
Assessment:  1  Chronic bilateral low back pain without sciatica    2  Lumbar spondylosis    3  Bulging of lumbar intervertebral disc        Plan:  While the patient was in the office today, I did thoroughly review the results of his most recent MRI of the lumbosacral spine and explained to him that with his current symptoms and exam findings, I feel that he would benefit from an L5-S1 interlaminar lumbar epidural steroid injection directed towards the left with Dr Barnabas Peabody  Complete risks and benefits including bleeding, infection, tissue reaction, nerve injury and allergic reaction were discussed  The approach was demonstrated using models and literature was provided  However, at this point time the patient want to take some time to read through the literature and think about the injection and if he decides he would like to proceed with the injections, he will call our office to schedule  With regards to physical therapy, I advised the patient that this point I feel would be in his best interest to retry the physical therapy and stressed the importance of the home exercise and stretching program   The patient was agreeable and verbalized an understanding  With regards to his pain medications as he is currently being prescribed Percocet and Tylenol No   4 by his primary care provider, he can continue to use them as prescribed by his primary care provider  The patient was agreeable and verbalized an understanding  I discussed with the patient that at this point time he can followup with our office on an as-needed basis  I did review the patient that if his pain symptoms should change, worsen, and/or if he would experience any new symptoms he would like to be evaluated for, he should give our office a call  The patient was agreeable and verbalized an understanding  History of Present Illness:     The patient is a 70 y o  male last seen on 11/29/18 who presents for a follow up office visit in regards to chronic pain secondary to a bulging lumbar disc and spondylosis  The patient currently reports that this point time he does continue with the chronic low back and left leg and knee pain, which he reports with regards to the leg pain is more intermittent with chronic back pain is the biggest issue  The patient presents today to discuss the results of his most recent MRI of the lumbar spine which did show disc bulging at L3-L4 and L4-L5 with lumbar spondylosis as well  He reports that the physical therapy is currently on hold because he want to see what he should do with our office 1st and then decide whether not to complete or finish physical therapy  I have personally reviewed and/or updated the patient's past medical history, past surgical history, family history, social history, current medications, allergies, and vital signs today  Review of Systems:    Review of Systems   Respiratory: Negative for shortness of breath  Cardiovascular: Negative for chest pain  Gastrointestinal: Negative for constipation, diarrhea, nausea and vomiting  Musculoskeletal: Positive for gait problem  Negative for arthralgias, joint swelling (joint stiffness) and myalgias  Skin: Negative for rash  Neurological: Negative for dizziness, seizures and weakness  All other systems reviewed and are negative          Past Medical History:   Diagnosis Date    Hyperthyroidism     last assessed: 10/28/16       Past Surgical History:   Procedure Laterality Date    INGUINAL HERNIA REPAIR Bilateral        Family History   Problem Relation Age of Onset    Heart disease Mother         cardiac disorder    Heart disease Father         cardiac disorder    Heart disease Maternal Grandmother         cardiac disorder    Heart disease Maternal Grandfather         cardiac disorder    Heart disease Paternal Grandmother         cardiac disorder    Heart disease Paternal Grandfather         cardiac disorder Social History     Occupational History    Not on file       Social History Main Topics    Smoking status: Current Every Day Smoker     Types: Cigarettes    Smokeless tobacco: Never Used    Alcohol use No    Drug use: No    Sexual activity: Not on file         Current Outpatient Prescriptions:     acetaminophen-codeine (TYLENOL #4) 300-60 MG per tablet, Take 1 tablet by mouth every 6 (six) hours as needed for moderate pain, Disp: 120 tablet, Rfl: 5    albuterol (2 5 mg/3 mL) 0 083 % nebulizer solution, Inhale 1 each, Disp: , Rfl:     albuterol (VENTOLIN HFA) 90 mcg/act inhaler, Inhale 2 puffs every 6 (six) hours as needed for wheezing, Disp: 18 g, Rfl: 5    ALPRAZolam (XANAX) 0 25 mg tablet, Take 1 tablet (0 25 mg total) by mouth 3 (three) times a day as needed for anxiety, Disp: 90 tablet, Rfl: 5    amLODIPine (NORVASC) 5 mg tablet, Take 1 tablet (5 mg total) by mouth daily, Disp: 30 tablet, Rfl: 5    aspirin 81 MG tablet, Take by mouth, Disp: , Rfl:     cyclobenzaprine (FLEXERIL) 10 mg tablet, Take 1 tablet (10 mg total) by mouth 3 (three) times a day as needed (prn pain), Disp: 30 tablet, Rfl: 5    DULoxetine (CYMBALTA) 60 mg delayed release capsule, Take 1 capsule by mouth daily, Disp: , Rfl:     ipratropium-albuterol (DUO-NEB) 0 5-2 5 mg/3 mL, Inhale 1 each every 4 (four) hours as needed, Disp: , Rfl:     oxyCODONE-acetaminophen (PERCOCET) 5-325 mg per tablet, Take 1 tablet by mouth every 6 (six) hours as needed for moderate pain or severe pain Earliest Fill Date: 12/12/18 Max Daily Amount: 4 tablets, Disp: 100 tablet, Rfl: 0    pantoprazole (PROTONIX) 40 mg tablet, Take 1 tablet by mouth daily, Disp: , Rfl:     Tiotropium Bromide-Olodaterol (STIOLTO RESPIMAT) 2 5-2 5 MCG/ACT AERS, Inhale 1 puff daily, Disp: , Rfl:     zolpidem (AMBIEN) 5 mg tablet, Take 1 tablet (5 mg total) by mouth daily at bedtime as needed for sleep, Disp: 30 tablet, Rfl: 5    No Known Allergies    Physical Exam:    There were no vitals taken for this visit  Constitutional:normal, well developed, well nourished, alert, in no distress and non-toxic and no overt pain behavior  Eyes:anicteric  HEENT:grossly intact  Neck:supple, symmetric, trachea midline and no masses   Pulmonary:even and unlabored  Cardiovascular:No edema or pitting edema present  Skin:Normal without rashes or lesions and well hydrated  Psychiatric:Mood and affect appropriate  Neurologic:Cranial Nerves II-XII grossly intact  Musculoskeletal:Slightly antalgic, but steady gait without the use of any assistive devices  Lumbar Spine Exam    Appearance:  Normal lordosis  Palpation/Tenderness:  left lumbar paraspinal tenderness  right lumbar paraspinal tenderness  left sacroiliac joint tenderness  right sacroiliac joint tenderness  Sensory:  no sensory deficits noted  Range of Motion:  Flexion: Moderately limited  with pain  Extension:  No limitation  without pain  Rotation - Left:  Minimally limited  with pain  Rotation - Right:  Minimally limited  with pain  Motor Strength:  Left hip flexion:  5/5  Left hip extension:  5/5  Right hip flexion:  5/5  Right hip extension:  5/5  Left knee flexion:  5/5  Left knee extension:  5/5  Right knee flexion:  5/5  Right knee extension:  5/5  Left foot dorsiflexion:  5/5  Left foot plantar flexion:  5/5  Right foot dorsiflexion:  5/5  Right foot plantar flexion:  5/5  Reflexes:  Left Patellar:  1+   Right Patellar:  2+   Left Achilles:  1+   Right Achilles:  2+   Special Tests:  Left Straight Leg Test:  positive  Right Straight Leg Test:  negative  Left Ferdinand's Maneuver:  negative  Right Ferdinand's Maneuver:  negative  Left Gaenslen's Test:  negative  Right Gaenslen's Test:  negative      Imaging  No orders to display         No orders of the defined types were placed in this encounter

## 2018-12-19 ENCOUNTER — TELEPHONE (OUTPATIENT)
Dept: PAIN MEDICINE | Facility: CLINIC | Age: 63
End: 2018-12-19

## 2018-12-19 PROBLEM — M51.26 DISPLACEMENT OF LUMBAR INTERVERTEBRAL DISC WITHOUT MYELOPATHY: Status: ACTIVE | Noted: 2018-12-19

## 2018-12-19 PROBLEM — M54.50 LOW BACK PAIN: Status: ACTIVE | Noted: 2018-12-19

## 2018-12-19 NOTE — TELEPHONE ENCOUNTER
Returned vm that was left on procedure line regarding scheduling injection  The phone number he left is not going through   Please transfer to [facility] at  [phone]

## 2018-12-19 NOTE — TELEPHONE ENCOUNTER
Scheduled pt for L5 S1 LESI for 1/17/19    Went over pre-procedure instructions below:  Pt denies rx blood thinners  Pt is currently taking 81mg aspirin self prescribed and was instructed to hold for 6 days with last does on 1/10/19  No other Nsaids  Nothing to eat or drink 1 hr prior to procedure  Need to arrange transportation  Proper clothing for procedure  If ill or placed on antibiotics please call to reschedule    Fu scheduled

## 2018-12-31 DIAGNOSIS — F41.9 ANXIETY: ICD-10-CM

## 2018-12-31 DIAGNOSIS — I10 ESSENTIAL HYPERTENSION: ICD-10-CM

## 2018-12-31 DIAGNOSIS — R52 PAIN: ICD-10-CM

## 2018-12-31 RX ORDER — AMLODIPINE BESYLATE 5 MG/1
5 TABLET ORAL DAILY
Qty: 30 TABLET | Refills: 0 | Status: SHIPPED | OUTPATIENT
Start: 2018-12-31 | End: 2019-06-24 | Stop reason: SDUPTHER

## 2018-12-31 RX ORDER — CYCLOBENZAPRINE HCL 10 MG
10 TABLET ORAL 3 TIMES DAILY PRN
Qty: 30 TABLET | Refills: 0 | Status: SHIPPED | OUTPATIENT
Start: 2018-12-31 | End: 2019-06-24 | Stop reason: SDUPTHER

## 2018-12-31 RX ORDER — ACETAMINOPHEN AND CODEINE PHOSPHATE 60; 300 MG/1; MG/1
1 TABLET ORAL EVERY 6 HOURS PRN
Qty: 120 TABLET | Refills: 0 | Status: SHIPPED | OUTPATIENT
Start: 2018-12-31 | End: 2019-06-24 | Stop reason: SDUPTHER

## 2018-12-31 RX ORDER — ALPRAZOLAM 0.25 MG/1
0.25 TABLET ORAL 3 TIMES DAILY PRN
Qty: 90 TABLET | Refills: 0 | Status: SHIPPED | OUTPATIENT
Start: 2018-12-31 | End: 2019-02-26 | Stop reason: SDUPTHER

## 2019-01-11 DIAGNOSIS — R52 PAIN: ICD-10-CM

## 2019-01-11 RX ORDER — OXYCODONE HYDROCHLORIDE AND ACETAMINOPHEN 5; 325 MG/1; MG/1
1 TABLET ORAL EVERY 6 HOURS PRN
Qty: 100 TABLET | Refills: 0 | Status: SHIPPED | OUTPATIENT
Start: 2019-01-11 | End: 2019-02-08 | Stop reason: SDUPTHER

## 2019-01-15 ENCOUNTER — TELEPHONE (OUTPATIENT)
Dept: PAIN MEDICINE | Facility: CLINIC | Age: 64
End: 2019-01-15

## 2019-01-15 NOTE — TELEPHONE ENCOUNTER
Pt having L5 S1 Lumbar Epidural Steroid Injection on 1/17/19    S/W pt  Pt asking if can still take his xanax, tylenol # 4 and percocet  Advised pt he can  Advised pt if he needs to take ibuprofen last dose is today and can not take other NSAIDS  Advised pt to continue to hold his ASA and that he will need a   Pt verbalized understanding

## 2019-01-15 NOTE — TELEPHONE ENCOUNTER
----- Message from Ira Dasilva sent at 1/15/2019  7:43 AM EST -----  Regarding: FW: Prescription Question  Contact: [phone]      ----- Message -----  From: Danita Mcgrath  Sent: 1/14/2019   5:40 PM  To: [facility]  Subject: Prescription Question                            Doc, let me know on Wednesday, what Meds i could take, on Thursday for my Epidural Procedure

## 2019-01-17 ENCOUNTER — APPOINTMENT (OUTPATIENT)
Dept: RADIOLOGY | Facility: HOSPITAL | Age: 64
End: 2019-01-17
Payer: MEDICARE

## 2019-01-17 ENCOUNTER — HOSPITAL ENCOUNTER (OUTPATIENT)
Facility: HOSPITAL | Age: 64
Setting detail: OUTPATIENT SURGERY
Discharge: HOME | End: 2019-01-17
Attending: ANESTHESIOLOGY | Admitting: ANESTHESIOLOGY
Payer: MEDICARE

## 2019-01-17 VITALS
BODY MASS INDEX: 22.08 KG/M2 | DIASTOLIC BLOOD PRESSURE: 70 MMHG | WEIGHT: 163 LBS | SYSTOLIC BLOOD PRESSURE: 156 MMHG | HEART RATE: 64 BPM | TEMPERATURE: 97.2 F | RESPIRATION RATE: 18 BRPM | OXYGEN SATURATION: 96 % | HEIGHT: 72 IN

## 2019-01-17 PROCEDURE — 62323 NJX INTERLAMINAR LMBR/SAC: CPT | Performed by: ANESTHESIOLOGY

## 2019-01-17 PROCEDURE — 72100 X-RAY EXAM L-S SPINE 2/3 VWS: CPT

## 2019-01-17 RX ORDER — NALOXONE HYDROCHLORIDE 4 MG/.1ML
SPRAY NASAL
Refills: 0 | COMMUNITY
Start: 2018-12-14 | End: 2019-10-22 | Stop reason: ALTCHOICE

## 2019-01-17 RX ORDER — METHYLPREDNISOLONE ACETATE 80 MG/ML
INJECTION, SUSPENSION INTRA-ARTICULAR; INTRALESIONAL; INTRAMUSCULAR; SOFT TISSUE AS NEEDED
Status: DISCONTINUED | OUTPATIENT
Start: 2019-01-17 | End: 2019-01-17 | Stop reason: HOSPADM

## 2019-01-17 RX ORDER — LIDOCAINE HYDROCHLORIDE 10 MG/ML
INJECTION, SOLUTION EPIDURAL; INFILTRATION; INTRACAUDAL; PERINEURAL AS NEEDED
Status: DISCONTINUED | OUTPATIENT
Start: 2019-01-17 | End: 2019-01-17 | Stop reason: HOSPADM

## 2019-01-17 NOTE — DISCHARGE INSTRUCTIONS
Epidural Steroid Injection   WHAT YOU NEED TO KNOW:   An epidural steroid injection (LARRY) is a procedure to inject steroid medicine into the epidural space  The epidural space is between your spinal cord and vertebrae  Steroids reduce inflammation and fluid buildup in your spine that may be causing pain  You may be given pain medicine along with the steroids  ACTIVITY  · Do not drive or operate machinery today  · No strenuous activity today - bending, lifting, etc   · You may resume normal activites starting tomorrow - start slowly and as tolerated  · You may shower today, but no tub baths or hot tubs  · You may have numbness for several hours from the local anesthetic  Please use caution and common sense, especially with weight-bearing activities  CARE OF THE INJECTION SITE  · If you have soreness or pain, apply ice to the area today (20 minutes on/20 minutes off)  · Starting tomorrow, you may use warm, moist heat or ice if needed  · You may have an increase or change in your discomfort for 36-48 hours after your treatment  · Apply ice and continue with any pain medication you have been prescribed  · Notify [facility] if you have any of the following: redness, drainage, swelling, headache, stiff neck or fever above 100°F     SPECIAL INSTRUCTIONS  · Our office will contact you in approximately 7 days for a progress report  MEDICATIONS  · Continue to take all routine medications  · Our office may have instructed you to hold some medications  If you have a problem specifically related to your procedure, please call our office at [phone]  Problems not related to your procedure should be directed to your primary care physician

## 2019-01-17 NOTE — H&P
Pt c/o back pain     Assessment:  1  Lumbar radiculopathy - Left    2  Degenerative disc disease, cervical    3  Lumbar spondylosis    4  Myofascial pain syndrome    5  Chronic pain of left knee          Plan:  68-year-old male with complaints of chronic back pain, chronic shoulder and hand pain, left leg pain the history significant for an injury while at work in [employer] presents to the office for follow-up visit  X-ray lumbar spine shows mild degenerative facet arthropathy at L3-4, L4-5, L5-S1  X-rays cervical spine shows moderate degenerative disc disease and mild bilateral foraminal stenosis at C5-C6  X-rays of bilateral shoulder shows mild degenerative changes  Patient is engaged in physical therapy for his back and his left leg however he does note that there isn't significant changes in his current symptoms  The patient does exhibit some radiculopathy in the left L4 and L5 nerve root distribution  1  We will perform a L5-S1 TFESI      There are risks associated with opioid medications, including dependence, addiction and tolerance  The patient understands and agrees to use these medications only as prescribed  Potential side effects of the medications include, but are not limited to, constipation, drowsiness, addiction, impaired judgment and risk of fatal overdose if not taken as prescribed  The patient was warned against driving while taking sedation medications  Sharing medications is a felony  At this point in time, the patient is showing no signs of addiction, abuse, diversion or suicidal ideation            [place] Prescription Drug Monitoring Program report was reviewed and was appropriate      Complete risks and benefits including bleeding, infection, tissue reaction, nerve injury and allergic reaction were discussed  The approach was demonstrated using models and literature was provided   Verbal and written consent was obtained      My impressions and treatment recommendations were discussed in detail with the patient who verbalized understanding and had no further questions  Discharge instructions were provided  I personally saw and examined the patient and I agree with the above discussed plan of care      No orders of the defined types were placed in this encounter      No orders of the defined types were placed in this encounter         History of Present Illness:  Author Dee is a 70 y o  male who presents for a follow up office visit in regards to Back Pain  The patients current symptoms include 3/10 constant throbbing pain in the midback and low back which is worse in the morning      Current pain medications includes:  Percocet 5/325, Flexeril, duloxetine  The patient reports that this regimen is providing 75% pain relief  The patient is reporting no side effects from this pain medication regimen            I have personally reviewed and/or updated the patient's past medical history, past surgical history, family history, social history, current medications, allergies, and vital signs today       Review of Systems   Respiratory: Positive for shortness of breath  Cardiovascular: Negative for chest pain  Gastrointestinal: Negative for constipation, diarrhea, nausea and vomiting  Musculoskeletal: Positive for gait problem  Negative for arthralgias, joint swelling and myalgias  Joint stiffnes   Skin: Negative for rash  Neurological: Negative for dizziness, seizures and weakness     All other systems reviewed and are negative             Patient Active Problem List   Diagnosis    Encounter for hepatitis C screening test for low risk patient    Pain    Anxiety    Back pain, chronic    Benign essential hypertension    Chronic obstructive pulmonary disease ([facility] [address] )    Screening for neurological condition         Medical History        Past Medical History:   Diagnosis Date    Hyperthyroidism       last assessed: 10/28/16            Surgical History         Past Surgical History:   Procedure Laterality Date    INGUINAL HERNIA REPAIR Bilateral                    Family History   Problem Relation Age of Onset    Heart disease Mother           cardiac disorder    Heart disease Father           cardiac disorder    Heart disease Maternal Grandmother           cardiac disorder    Heart disease Maternal Grandfather           cardiac disorder    Heart disease Paternal Grandmother           cardiac disorder    Heart disease Paternal Grandfather           cardiac disorder         Social History          Occupational History    Not on file             Social History Main Topics    Smoking status: Current Every Day Smoker       Types: Cigarettes    Smokeless tobacco: Never Used    Alcohol use No    Drug use: No    Sexual activity: Not on file              Current Outpatient Prescriptions on File Prior to Visit   Medication Sig    acetaminophen-codeine (TYLENOL #4) 300-60 MG per tablet Take 1 tablet by mouth every 6 (six) hours as needed for moderate pain    albuterol (2 5 mg/3 mL) 0 083 % nebulizer solution Inhale 1 each    albuterol (VENTOLIN HFA) 90 mcg/act inhaler Inhale 2 puffs every 6 (six) hours as needed for wheezing    ALPRAZolam (XANAX) 0 25 mg tablet Take 1 tablet (0 25 mg total) by mouth 3 (three) times a day as needed for anxiety    amLODIPine (NORVASC) 5 mg tablet Take 1 tablet (5 mg total) by mouth daily    aspirin 81 MG tablet Take by mouth    capsaicin (ZOSTRIX) 0 025 % cream Apply 1 application topically 2 (two) times a day (Patient not taking: Reported on 10/31/2018 )    cyclobenzaprine (FLEXERIL) 10 mg tablet Take 1 tablet (10 mg total) by mouth 3 (three) times a day as needed (prn pain)    diclofenac sodium (VOLTAREN) 1 % Apply 2 g topically 4 (four) times a day    DULoxetine (CYMBALTA) 60 mg delayed release capsule Take 1 capsule by mouth daily    ipratropium-albuterol (COMBIVENT RESPIMAT) inhaler Inhale 1 puff 4 (four) times a day    ipratropium-albuterol (DUO-NEB) 0 5-2 5 mg/3 mL Inhale 1 each every 4 (four) hours as needed    oxyCODONE-acetaminophen (PERCOCET) 5-325 mg per tablet Take 1 tablet by mouth every 6 (six) hours as needed for moderate pain or severe pain Earliest Fill Date: 11/12/18 Max Daily Amount: 4 tablets    pantoprazole (PROTONIX) 40 mg tablet Take 1 tablet by mouth daily    Tiotropium Bromide-Olodaterol (STIOLTO RESPIMAT) 2 5-2 5 MCG/ACT AERS Inhale 1 puff daily    zolpidem (AMBIEN) 5 mg tablet Take 1 tablet (5 mg total) by mouth daily at bedtime as needed for sleep      No current facility-administered medications on file prior to visit           No Known Allergies     Physical Exam:     There were no vitals taken for this visit      Constitutional:normal, well developed, well nourished, alert, in no distress and non-toxic and no overt pain behavior  Eyes:anicteric  HEENT:grossly intact  Neck:supple, symmetric, trachea midline and no masses   Pulmonary:even and unlabored  Cardiovascular:No edema or pitting edema present  Skin:Normal without rashes or lesions and well hydrated  Psychiatric:Mood and affect appropriate  Neurologic:Cranial Nerves II-XII grossly intact  Musculoskeletal:normal         Cervical Spine examination demonstrates  Decreased ROM secondary to pain with lateral rotation to the left/right and bending to the left/right, in addition to neck flexion  5/5 upper extremity strength in all muscle groups bilaterally  Negative Spurling's maneuver to the b/l Ue, sensitivity to light touch intact b/l Ue       Thoracic Spine examination demonstrates  Bilateral thoracic paraspinal musculature tender to palpation with muscle spasms noted throughout her paraspinals       Lumbar/Sacral Spine examination demonstrates  Decreased range of motion lumbar spine with pain upon: flexion, lateral rotation to the left/right, and bending to the left/right  Bilateral lumbar paraspinals tender to palpation   Muscle spasms noted in the lumbar area bilaterally  3/5 left lower extremity strength in all muscle groups knee extension  Positive seated straight leg raise for left lower extremity  Sensitivity to light touch intact bilateral lower extremities  4+ reflexes in the patella and Achilles    No ankle clonus

## 2019-01-17 NOTE — OP NOTE
OPERATIVE REPORT  PATIENT NAME: Jessica Jolley    :  1955  MRN: [id number]  Pt Location: [facility] OR ROOM 01    SURGERY DATE: 2019    Surgeon(s) and Role:     * Court Robledo MD - Primary    Preop Diagnosis:  Low back pain, unspecified back pain laterality, unspecified chronicity, with sciatica presence unspecified [M54 5]  Lumbar spondylosis [M47 816]  Displacement of lumbar intervertebral disc without myelopathy [M51 26]    Post-Op Diagnosis Codes:     * Low back pain, unspecified back pain laterality, unspecified chronicity, with sciatica presence unspecified [M54 5]     * Lumbar spondylosis [M47 816]     * Displacement of lumbar intervertebral disc without myelopathy [M51 26]    Procedure(s) (LRB):  L5 S1 Lumbar Epidural Steroid Injection (96981) (N/A)    Specimen(s):  * No specimens in log *    Estimated Blood Loss:   Minimal    Drains:       Anesthesia Type:   Local    Operative Indications:  Low back pain, unspecified back pain laterality, unspecified chronicity, with sciatica presence unspecified [M54 5]  Lumbar spondylosis [M47 816]  Displacement of lumbar intervertebral disc without myelopathy [M51 26]      Operative Findings:  same    Complications:   None    Procedure and Technique:  Fluoroscopically-guided lumbar Interlaminar Epidural Steroid Injection     Indication:  Lumbar and bilateral leg pain  Preoperative diagnosis:  Lumbar radiculitis  Postoperative diagnosis:  Lumbar radiculitis    Procedure: Fluoroscopically-guided  L5-S1 interlaminar epidural steroid injection under fluoroscopy      After discussing the risks, benefits, and alternatives to the procedure, the patient expressed understanding and wished to proceed  The patient was brought to the fluoroscopy suite and placed in the prone position    A procedural pause was conducted to verify:  correct patient identity, procedure to be performed and as applicable, correct side and site, correct patient position, and availability of implants, special equipment and special requirements  After identifying the L5-S1 space fluoroscopically, the skin was sterilely prepped and draped in the usual fashion using Chloraprep skin prep  The skin and subcutaneous tissue were anesthetized with 0 5 % lidocaine  Utilizing a loss of resistance technique and intermittent fluoroscopic guidance, a 3 5 20 gauge Tuohy needle was advanced into the epidural space  Proper needle positioning was confirmed using multiple fluoroscopic views  After negative aspiration, Omnipaque 300 contrast was injected confirming epidural spread without evidence of intravascular or intrathecal spread  A 4 ml solution consisting of 80 mg of Depo-Medrol in sterile saline was injected slowly and incrementally into the epidural space  Following the injection the needle was withdrawn slightly and flushed with 0 5 % lidocaine as it was fully extracted  The patient tolerated the procedure well and there were no apparent complications  After appropriate observation, the patient was dismissed from the clinic in good condition under their own power     I was present for the entire procedure    Patient Disposition:  hemodynamically stable    SIGNATURE: Chuck Glass MD  DATE: January 17, 2019  TIME: 9:58 AM

## 2019-01-18 ENCOUNTER — OFFICE VISIT (OUTPATIENT)
Dept: FAMILY MEDICINE CLINIC | Facility: CLINIC | Age: 64
End: 2019-01-18
Payer: MEDICARE

## 2019-01-18 VITALS
OXYGEN SATURATION: 95 % | BODY MASS INDEX: 22.57 KG/M2 | RESPIRATION RATE: 15 BRPM | HEIGHT: 72 IN | HEART RATE: 85 BPM | WEIGHT: 166.6 LBS | DIASTOLIC BLOOD PRESSURE: 78 MMHG | SYSTOLIC BLOOD PRESSURE: 128 MMHG

## 2019-01-18 DIAGNOSIS — I10 BENIGN ESSENTIAL HYPERTENSION: Primary | ICD-10-CM

## 2019-01-18 DIAGNOSIS — Z13.6 SCREENING FOR AAA (ABDOMINAL AORTIC ANEURYSM): ICD-10-CM

## 2019-01-18 DIAGNOSIS — J44.9 CHRONIC OBSTRUCTIVE PULMONARY DISEASE, UNSPECIFIED COPD TYPE (HCC): ICD-10-CM

## 2019-01-18 DIAGNOSIS — Z00.00 MEDICARE ANNUAL WELLNESS VISIT, INITIAL: ICD-10-CM

## 2019-01-18 PROCEDURE — G0402 INITIAL PREVENTIVE EXAM: HCPCS | Performed by: FAMILY MEDICINE

## 2019-01-18 RX ORDER — ALBUTEROL SULFATE 2.5 MG/3ML
2.5 SOLUTION RESPIRATORY (INHALATION) EVERY 4 HOURS PRN
Qty: 100 VIAL | Refills: 5 | Status: SHIPPED | OUTPATIENT
Start: 2019-01-18 | End: 2019-08-26 | Stop reason: SDUPTHER

## 2019-01-18 NOTE — PATIENT INSTRUCTIONS

## 2019-01-18 NOTE — PROGRESS NOTES
Assessment/Plan:    No problem-specific Assessment & Plan notes found for this encounter  Diagnoses and all orders for this visit:    Benign essential hypertension  -     albuterol (2 5 mg/3 mL) 0 083 % nebulizer solution; Take 1 vial (2 5 mg total) by nebulization every 4 (four) hours as needed for wheezing or shortness of breath  -     Lipid panel; Future  -     Comprehensive metabolic panel; Future  -     TSH, 3rd generation; Future  -     Microalbumin / creatinine urine ratio    Screening for AAA (abdominal aortic aneurysm)  -      abdominal aorta screening aaa; Future    Medicare annual wellness visit, initial    Chronic obstructive pulmonary disease, unspecified COPD type ([facility] )    Other orders  -     NARCAN 4 MG/0 1ML LIQD;           Subjective:      Patient ID: Jenny Valdez is a 70 y o  male  His blood pressure is well controlled in the office today  He has no chest pain or shortness of breath  He has no headache or vision changes  He has COPD  He has no cough or increased sputum production  He is not wheezing  He continues to smoke and is not interested in quitting at this time  He has chronic pain  This is related to his low back and knee  He is seeing pain management and getting epidural injections  He has assigned treatment agreement in the chart  He has urine toxicology consistent with usual and regular use of his prescribed medications  We have queried the [place] prescription drug monitoring program assure that we are the only provider writing for this medication  The following portions of the patient's history were reviewed and updated as appropriate:   He  has a past medical history of Hyperthyroidism    He   Patient Active Problem List    Diagnosis Date Noted    Screening for AAA (abdominal aortic aneurysm) 01/18/2019    Medicare annual wellness visit, initial 01/18/2019    Low back pain 12/19/2018    Displacement of lumbar intervertebral disc without myelopathy 12/19/2018    Lumbar spondylosis 12/14/2018    Bulging of lumbar intervertebral disc 12/14/2018    Screening for neurological condition 10/18/2018    Encounter for hepatitis C screening test for low risk patient 04/13/2018    Pain 04/13/2018    Benign essential hypertension 01/15/2018    Osteoarthritis of both knees 10/18/2017    Other instability, right knee 10/18/2017    Left knee pain 09/09/2016    Pain of left hip 09/09/2016    Neck pain 08/05/2016    Pain in joint of left shoulder 08/05/2016    Other chest pain 06/07/2016    Chronic low back pain 10/08/2015    Insomnia 09/29/2015    Early satiety 02/26/2015    GERD (gastroesophageal reflux disease) 10/31/2014    Headache 05/16/2014    Incisional hernia 10/11/2013    Weight loss, non-intentional 10/04/2013    Fatigue 06/26/2013    Anxiety 11/14/2012    Chronic obstructive pulmonary disease ([facility] [address] ) 11/14/2012     He  has a past surgical history that includes Inguinal hernia repair (Bilateral)  His family history includes Heart disease in his father, maternal grandfather, maternal grandmother, mother, paternal grandfather, and paternal grandmother  He  reports that he has been smoking Cigarettes  He has been smoking about 1 00 pack per day  He has never used smokeless tobacco  He reports that he does not drink alcohol or use drugs    Current Outpatient Prescriptions   Medication Sig Dispense Refill    acetaminophen-codeine (TYLENOL #4) 300-60 MG per tablet Take 1 tablet by mouth every 6 (six) hours as needed for moderate pain 120 tablet 0    albuterol (2 5 mg/3 mL) 0 083 % nebulizer solution Take 1 vial (2 5 mg total) by nebulization every 4 (four) hours as needed for wheezing or shortness of breath 100 vial 5    albuterol (VENTOLIN HFA) 90 mcg/act inhaler Inhale 2 puffs every 6 (six) hours as needed for wheezing 18 g 5    ALPRAZolam (XANAX) 0 25 mg tablet Take 1 tablet (0 25 mg total) by mouth 3 (three) times a day as needed for anxiety 90 tablet 0    amLODIPine (NORVASC) 5 mg tablet Take 1 tablet (5 mg total) by mouth daily 30 tablet 0    aspirin 81 MG tablet Take by mouth      cyclobenzaprine (FLEXERIL) 10 mg tablet Take 1 tablet (10 mg total) by mouth 3 (three) times a day as needed (prn pain) 30 tablet 0    diclofenac sodium (VOLTAREN) 1 % Apply 2 g topically 4 (four) times a day      DULoxetine (CYMBALTA) 60 mg delayed release capsule Take 1 capsule by mouth daily      oxyCODONE-acetaminophen (PERCOCET) 5-325 mg per tablet Take 1 tablet by mouth every 6 (six) hours as needed for moderate pain or severe pain Earliest Fill Date: 1/11/19 Max Daily Amount: 4 tablets 100 tablet 0    pantoprazole (PROTONIX) 40 mg tablet Take 1 tablet by mouth daily      zolpidem (AMBIEN) 5 mg tablet Take 1 tablet (5 mg total) by mouth daily at bedtime as needed for sleep 30 tablet 5    NARCAN 4 MG/0 1ML LIQD   0     No current facility-administered medications for this visit        Current Outpatient Prescriptions on File Prior to Visit   Medication Sig    acetaminophen-codeine (TYLENOL #4) 300-60 MG per tablet Take 1 tablet by mouth every 6 (six) hours as needed for moderate pain    albuterol (VENTOLIN HFA) 90 mcg/act inhaler Inhale 2 puffs every 6 (six) hours as needed for wheezing    ALPRAZolam (XANAX) 0 25 mg tablet Take 1 tablet (0 25 mg total) by mouth 3 (three) times a day as needed for anxiety    amLODIPine (NORVASC) 5 mg tablet Take 1 tablet (5 mg total) by mouth daily    aspirin 81 MG tablet Take by mouth    cyclobenzaprine (FLEXERIL) 10 mg tablet Take 1 tablet (10 mg total) by mouth 3 (three) times a day as needed (prn pain)    diclofenac sodium (VOLTAREN) 1 % Apply 2 g topically 4 (four) times a day    DULoxetine (CYMBALTA) 60 mg delayed release capsule Take 1 capsule by mouth daily    oxyCODONE-acetaminophen (PERCOCET) 5-325 mg per tablet Take 1 tablet by mouth every 6 (six) hours as needed for moderate pain or severe pain Earliest Fill Date: 1/11/19 Max Daily Amount: 4 tablets    pantoprazole (PROTONIX) 40 mg tablet Take 1 tablet by mouth daily    zolpidem (AMBIEN) 5 mg tablet Take 1 tablet (5 mg total) by mouth daily at bedtime as needed for sleep    [DISCONTINUED] albuterol (2 5 mg/3 mL) 0 083 % nebulizer solution Inhale 1 each     No current facility-administered medications on file prior to visit  He has No Known Allergies       Review of Systems   All other systems reviewed and are negative  Objective:      /78 (BP Location: Right arm, Patient Position: Sitting, Cuff Size: Standard)   Pulse 85   Resp 15   Ht 6' (1 829 m)   Wt 75 6 kg (166 lb 9 6 oz)   SpO2 95%   BMI 22 60 kg/m²          Physical Exam   Constitutional: He is oriented to person, place, and time  He appears well-developed and well-nourished  Neck: Normal range of motion  Neck supple  Cardiovascular: Normal rate, regular rhythm, normal heart sounds and intact distal pulses  Pulmonary/Chest: Effort normal and breath sounds normal    Abdominal: Soft  Bowel sounds are normal    Musculoskeletal: Normal range of motion  Neurological: He is alert and oriented to person, place, and time  He has normal reflexes  Skin: Skin is warm and dry  Psychiatric: He has a normal mood and affect  His behavior is normal  Judgment and thought content normal    Nursing note and vitals reviewed

## 2019-01-18 NOTE — PROGRESS NOTES
Assessment and Plan:  Problem List Items Addressed This Visit     None        Health Maintenance Due   Topic Date Due    Hepatitis C Screening  1955    Medicare Annual Wellness Visit (AWV)  1955    PT PLAN OF CARE  12/21/2018         HPI:  Patient Active Problem List   Diagnosis    Encounter for hepatitis C screening test for low risk patient    Pain    Anxiety    Benign essential hypertension    Chronic obstructive pulmonary disease ([facility] [address] )    Screening for neurological condition    Chronic low back pain    Early satiety    GERD (gastroesophageal reflux disease)    Fatigue    Headache    Incisional hernia    Insomnia    Left knee pain    Neck pain    Osteoarthritis of both knees    Other chest pain    Other instability, right knee    Pain in joint of left shoulder    Pain of left hip    Weight loss, non-intentional    Lumbar spondylosis    Bulging of lumbar intervertebral disc    Low back pain    Displacement of lumbar intervertebral disc without myelopathy     Past Medical History:   Diagnosis Date    Hyperthyroidism     last assessed: 10/28/16     Past Surgical History:   Procedure Laterality Date    INGUINAL HERNIA REPAIR Bilateral      Family History   Problem Relation Age of Onset    Heart disease Mother         cardiac disorder    Heart disease Father         cardiac disorder    Heart disease Maternal Grandmother         cardiac disorder    Heart disease Maternal Grandfather         cardiac disorder    Heart disease Paternal Grandmother         cardiac disorder    Heart disease Paternal Grandfather         cardiac disorder     History   Smoking Status    Current Every Day Smoker    Packs/day: 1 00    Types: Cigarettes   Smokeless Tobacco    Never Used     History   Alcohol Use No      History   Drug Use No         Current Outpatient Prescriptions   Medication Sig Dispense Refill    acetaminophen-codeine (TYLENOL #4) 300-60 MG per tablet Take 1 tablet by mouth every 6 (six) hours as needed for moderate pain 120 tablet 0    albuterol (2 5 mg/3 mL) 0 083 % nebulizer solution Inhale 1 each      albuterol (VENTOLIN HFA) 90 mcg/act inhaler Inhale 2 puffs every 6 (six) hours as needed for wheezing 18 g 5    ALPRAZolam (XANAX) 0 25 mg tablet Take 1 tablet (0 25 mg total) by mouth 3 (three) times a day as needed for anxiety 90 tablet 0    amLODIPine (NORVASC) 5 mg tablet Take 1 tablet (5 mg total) by mouth daily 30 tablet 0    aspirin 81 MG tablet Take by mouth      cyclobenzaprine (FLEXERIL) 10 mg tablet Take 1 tablet (10 mg total) by mouth 3 (three) times a day as needed (prn pain) 30 tablet 0    diclofenac sodium (VOLTAREN) 1 % Apply 2 g topically 4 (four) times a day      DULoxetine (CYMBALTA) 60 mg delayed release capsule Take 1 capsule by mouth daily      NARCAN 4 MG/0 1ML LIQD   0    oxyCODONE-acetaminophen (PERCOCET) 5-325 mg per tablet Take 1 tablet by mouth every 6 (six) hours as needed for moderate pain or severe pain Earliest Fill Date: 1/11/19 Max Daily Amount: 4 tablets 100 tablet 0    pantoprazole (PROTONIX) 40 mg tablet Take 1 tablet by mouth daily      zolpidem (AMBIEN) 5 mg tablet Take 1 tablet (5 mg total) by mouth daily at bedtime as needed for sleep 30 tablet 5     No current facility-administered medications for this visit        No Known Allergies  Immunization History   Administered Date(s) Administered     Influenza (IM) Preservative Free 08/01/2015    H1N1, All Formulations 01/08/2010    Influenza 11/03/2011, 01/01/2012, 01/01/2014, 08/26/2014, 08/01/2015, 08/15/2016, 09/23/2017, 09/09/2018    Influenza Quadrivalent Preservative Free 3 years and older IM 09/23/2017    Influenza TIV (IM) 08/26/2014    Pneumococcal Conjugate 13-Valent 08/01/2015    Pneumococcal Conjugate PCV 7 01/01/2011    Pneumococcal Polysaccharide PPV23 09/09/2018    Tdap 08/26/2014    Tuberculin Skin Test-PPD Intradermal 10/04/2013, 10/23/2014       Patient Care Team:  Nette Hernandez MD as PCP - General    Medicare Screening Tests and Risk Assessments:  Last Medicare Wellness visit information reviewed, patient interviewed and updates made to the record today  Health Risk Assessment:  Patient rates overall health as good  Patient feels that their physical health rating is Slightly worse  Eyesight was rated as Same  Hearing was rated as Same  Patient feels that their emotional and mental health rating is Same  Pain experienced by patient in the last 7 days has been A lot  Patient's pain rating has been 8/10  Emotional/Mental Health:  Patient has been feeling nervous/anxious  PHQ-9 Depression Screening:    Frequency of the following problems over the past two weeks:      1  Little interest or pleasure in doing things: 1 - several days      2  Feeling down, depressed, or hopeless: 1 - several days  PHQ-2 Score: 2          Broken Bones/Falls: Fall Risk Assessment:    In the past year, patient has experienced: No history of falling in past year          Bladder/Bowel:  Patient has not leaked urine accidently in the last six months  Patient reports no loss of bowel control  Immunizations:  Patient has had a flu vaccination within the last year  Patient has received a pneumonia shot  Patient has not received a shingles shot  Home Safety:  Patient does not have trouble with stairs inside or outside of their home  Patient currently reports that there are no safety hazards present in home, working smoke alarms, no working carbon monoxide detectors  Preventative Screenings:   no prostate cancer screen performed, no colon cancer screen completed, no cholesterol screen completed, no glaucoma eye exam completed    Nutrition:  Current diet: Regular and Limited junk food with servings of the following:    Medications:  Patient is not currently taking any over-the-counter supplements  Patient is able to manage medications      Lifestyle Choices:  Patient reports current tobacco use  Patient reports no alcohol use  Patient drives a vehicle  Activities of Daily Living:  Can get out of bed by his or her self, able to dress self, able to make own meals, able to do own shopping, able to bathe self, can do own laundry/housekeeping, can manage own money, pay bills and track expenses    Previous Hospitalizations:  No hospitalization or ED visit in past 12 months        Advanced Directives:  Patient has not decided on power of   Patient has not completed advanced directive  Preventative Screening/Counseling:      Cardiovascular:      General: Risks and Benefits Discussed and Screening Current          Diabetes:      General: Risks and Benefits Discussed and Screening Current          Colorectal Cancer:      General: Risks and Benefits Discussed and Screening Current          Prostate Cancer:      General: Screening Not Indicated          Osteoporosis:      General: Screening Not Indicated          AAA:  Male patient with age over [de-identified] years  Patient has history of tobacco use  General: Risks and Benefits Discussed          Glaucoma:      General: Screening Current          HIV:      General: Screening Not Indicated          Hepatitis C:      General: Screening Current        Advanced Directives:   Patient has no living will for healthcare, does not have durable POA for healthcare, patient does not have an advanced directive  Information on ACP and/or AD provided  5 wishes given  End of life assessment reviewed with patient  Provider agrees with end of life decisions        Immunizations:      Influenza: Influenza UTD This Year, Influenza Recommended Annually and Risks & Benefits Discussed      Pneumococcal: Lifetime Vaccine Completed      Shingrix: Risks & Benefits Discussed and Patient Declines      Hepatitis B (Low risk patients): Series Not Indicated      Zostavax: Vaccine Status Unknown      TD: Vaccine Status Unknown      TDAP: Vaccine Status Unknown            No exam data present    Physical Exam:  Review of Systems   Gastrointestinal: Negative for bowel incontinence  Psychiatric/Behavioral: The patient is nervous/anxious  There were no vitals filed for this visit  There is no height or weight on file to calculate BMI      Physical Exam

## 2019-01-24 ENCOUNTER — TELEPHONE (OUTPATIENT)
Dept: PAIN MEDICINE | Facility: CLINIC | Age: 64
End: 2019-01-24

## 2019-02-08 DIAGNOSIS — R52 PAIN: ICD-10-CM

## 2019-02-08 RX ORDER — PANTOPRAZOLE SODIUM 40 MG/1
40 TABLET, DELAYED RELEASE ORAL DAILY
Qty: 30 TABLET | Refills: 5 | Status: SHIPPED | OUTPATIENT
Start: 2019-02-08 | End: 2019-09-04 | Stop reason: SDUPTHER

## 2019-02-08 RX ORDER — OXYCODONE HYDROCHLORIDE AND ACETAMINOPHEN 5; 325 MG/1; MG/1
1 TABLET ORAL EVERY 6 HOURS PRN
Qty: 100 TABLET | Refills: 0 | Status: SHIPPED | OUTPATIENT
Start: 2019-02-08 | End: 2019-03-08 | Stop reason: SDUPTHER

## 2019-02-08 RX ORDER — DULOXETIN HYDROCHLORIDE 60 MG/1
60 CAPSULE, DELAYED RELEASE ORAL DAILY
Qty: 30 CAPSULE | Refills: 5 | Status: SHIPPED | OUTPATIENT
Start: 2019-02-08 | End: 2019-08-26 | Stop reason: SDUPTHER

## 2019-02-22 ENCOUNTER — HOSPITAL ENCOUNTER (OUTPATIENT)
Dept: ULTRASOUND IMAGING | Facility: HOSPITAL | Age: 64
Discharge: HOME | End: 2019-02-22
Attending: FAMILY MEDICINE
Payer: MEDICARE

## 2019-02-22 DIAGNOSIS — Z13.6 SCREENING FOR AAA (ABDOMINAL AORTIC ANEURYSM): ICD-10-CM

## 2019-02-22 PROCEDURE — 76706 US ABDL AORTA SCREEN AAA: CPT

## 2019-02-26 ENCOUNTER — OFFICE VISIT (OUTPATIENT)
Dept: PAIN MEDICINE | Facility: CLINIC | Age: 64
End: 2019-02-26
Payer: MEDICARE

## 2019-02-26 VITALS — BODY MASS INDEX: 21.94 KG/M2 | SYSTOLIC BLOOD PRESSURE: 126 MMHG | WEIGHT: 161.8 LBS | DIASTOLIC BLOOD PRESSURE: 78 MMHG

## 2019-02-26 DIAGNOSIS — G89.29 CHRONIC BILATERAL LOW BACK PAIN WITHOUT SCIATICA: ICD-10-CM

## 2019-02-26 DIAGNOSIS — M79.18 MYOFASCIAL PAIN SYNDROME: ICD-10-CM

## 2019-02-26 DIAGNOSIS — M54.40 CHRONIC BILATERAL LOW BACK PAIN WITH SCIATICA, SCIATICA LATERALITY UNSPECIFIED: ICD-10-CM

## 2019-02-26 DIAGNOSIS — M54.16 LUMBAR RADICULOPATHY: Primary | ICD-10-CM

## 2019-02-26 DIAGNOSIS — F41.9 ANXIETY: ICD-10-CM

## 2019-02-26 DIAGNOSIS — R52 PAIN: ICD-10-CM

## 2019-02-26 DIAGNOSIS — M54.50 CHRONIC BILATERAL LOW BACK PAIN WITHOUT SCIATICA: ICD-10-CM

## 2019-02-26 DIAGNOSIS — M47.816 LUMBAR SPONDYLOSIS: ICD-10-CM

## 2019-02-26 DIAGNOSIS — G89.29 CHRONIC BILATERAL LOW BACK PAIN WITH SCIATICA, SCIATICA LATERALITY UNSPECIFIED: ICD-10-CM

## 2019-02-26 PROCEDURE — 99214 OFFICE O/P EST MOD 30 MIN: CPT | Performed by: ANESTHESIOLOGY

## 2019-02-26 RX ORDER — ALPRAZOLAM 0.25 MG/1
TABLET ORAL
Qty: 90 TABLET | Refills: 0 | Status: SHIPPED | OUTPATIENT
Start: 2019-02-26 | End: 2019-03-29 | Stop reason: SDUPTHER

## 2019-02-26 RX ORDER — ACETAMINOPHEN AND CODEINE PHOSPHATE 60; 300 MG/1; MG/1
TABLET ORAL
Qty: 120 TABLET | Refills: 5 | Status: ON HOLD | OUTPATIENT
Start: 2019-02-26 | End: 2019-03-06

## 2019-02-26 NOTE — H&P (VIEW-ONLY)
Assessment:  1  Lumbar radiculopathy    2  Lumbar spondylosis    3  Chronic bilateral low back pain with sciatica, sciatica laterality unspecified    4  Chronic bilateral low back pain without sciatica    5  Myofascial pain syndrome        Plan:  Patient a 57-year-old male with complaints of low back pain and bilateral of leg pain with a history significant for bulging discs and lumbar spondylosis presents to office for follow-up visit  Patient is status post L5-S1 interlaminar epidural steroid injection performed on 01/17/2019 and denies any alleviation of low back and leg pain  From clinical history physical exam appears the patient has a left L4 and L5 radiculopathy  We will try to performing more precise steroid injection targeting the L4 and L5 nerve roots on the left side in attempts to alleviate his radicular pain  After this will then consider doing a medial branch block to help with the facetogenic low back  1  We will schedule patient for a left L4-L5 and L5-S1 transforaminal epidural steroid injection  2  Patient continue opioid pain regimen prescribed by primary care provider         Complete risks and benefits including bleeding, infection, tissue reaction, nerve injury and allergic reaction were discussed  The approach was demonstrated using models and literature was provided  Verbal and written consent was obtained  [place] Prescription Drug Monitoring Program report was reviewed and was appropriate       History of Present Illness: The patient is a 70 y o  male who presents for a follow up office visit in regards to Back Pain and Leg Pain  The patients current symptoms include 8/10 constant sharp pain in the low back and knees and ankles which is worse in the morning  Current pain medications includes:    The patient reports that this regimen is providing 0% pain relief  The patient is reporting no side effects from this pain medication regimen      I have personally reviewed and/or updated the patient's past medical history, past surgical history, family history, social history, current medications, allergies, and vital signs today  Review of Systems  Review of Systems   Musculoskeletal: Positive for gait problem  Joint stiffness  Pain in extremity - lower back   All other systems reviewed and are negative  Past Medical History:   Diagnosis Date    Hyperthyroidism     last assessed: 10/28/16       Past Surgical History:   Procedure Laterality Date    EPIDURAL BLOCK INJECTION N/A 1/17/2019    Procedure: L5 S1 Lumbar Epidural Steroid Injection (10866);   Surgeon: Lnyne Castillo MD;  Location: [facility];  Service: Pain Management     INGUINAL HERNIA REPAIR Bilateral        Family History   Problem Relation Age of Onset    Heart disease Mother         cardiac disorder    Heart disease Father         cardiac disorder    Heart disease Maternal Grandmother         cardiac disorder    Heart disease Maternal Grandfather         cardiac disorder    Heart disease Paternal Grandmother         cardiac disorder    Heart disease Paternal Grandfather         cardiac disorder       Social History     Occupational History    Not on file   Tobacco Use    Smoking status: Current Every Day Smoker     Packs/day: 1 00     Types: Cigarettes    Smokeless tobacco: Never Used   Substance and Sexual Activity    Alcohol use: No    Drug use: No    Sexual activity: Not on file         Current Outpatient Medications:     acetaminophen-codeine (TYLENOL #4) 300-60 MG per tablet, Take 1 tablet by mouth every 6 (six) hours as needed for moderate pain, Disp: 120 tablet, Rfl: 0    albuterol (2 5 mg/3 mL) 0 083 % nebulizer solution, Take 1 vial (2 5 mg total) by nebulization every 4 (four) hours as needed for wheezing or shortness of breath, Disp: 100 vial, Rfl: 5    albuterol (VENTOLIN HFA) 90 mcg/act inhaler, Inhale 2 puffs every 6 (six) hours as needed for wheezing, Disp: 18 g, Rfl: 5    ALPRAZolam (XANAX) 0 25 mg tablet, Take 1 tablet (0 25 mg total) by mouth 3 (three) times a day as needed for anxiety, Disp: 90 tablet, Rfl: 0    amLODIPine (NORVASC) 5 mg tablet, Take 1 tablet (5 mg total) by mouth daily, Disp: 30 tablet, Rfl: 0    aspirin 81 MG tablet, Take by mouth, Disp: , Rfl:     cyclobenzaprine (FLEXERIL) 10 mg tablet, Take 1 tablet (10 mg total) by mouth 3 (three) times a day as needed (prn pain), Disp: 30 tablet, Rfl: 0    diclofenac sodium (VOLTAREN) 1 %, Apply 2 g topically 4 (four) times a day, Disp: , Rfl:     DULoxetine (CYMBALTA) 60 mg delayed release capsule, Take 1 capsule (60 mg total) by mouth daily, Disp: 30 capsule, Rfl: 5    NARCAN 4 MG/0 1ML LIQD, , Disp: , Rfl: 0    oxyCODONE-acetaminophen (PERCOCET) 5-325 mg per tablet, Take 1 tablet by mouth every 6 (six) hours as needed for moderate pain or severe pain Max Daily Amount: 4 tablets, Disp: 100 tablet, Rfl: 0    pantoprazole (PROTONIX) 40 mg tablet, Take 1 tablet (40 mg total) by mouth daily, Disp: 30 tablet, Rfl: 5    zolpidem (AMBIEN) 5 mg tablet, Take 1 tablet (5 mg total) by mouth daily at bedtime as needed for sleep, Disp: 30 tablet, Rfl: 5    No Known Allergies    Physical Exam:    There were no vitals taken for this visit  Constitutional:normal, well developed, well nourished, alert, in no distress and non-toxic and no overt pain behavior  Eyes:anicteric  HEENT:grossly intact  Neck:supple, symmetric, trachea midline and no masses   Pulmonary:even and unlabored  Cardiovascular:No edema or pitting edema present  Skin:Normal without rashes or lesions and well hydrated  Psychiatric:Mood and affect appropriate  Neurologic:Cranial Nerves II-XII grossly intact  Musculoskeletal:antalgic    Lumbar/Sacral Spine examination demonstrates  Decreased range of motion lumbar spine with pain upon: flexion, lateral rotation to the left/right, and bending to the left/right    Bilateral lumbar paraspinals tender to palpation  Muscle spasms noted in the lumbar area bilaterally  4/5 lower extremity strength in all muscle groups bilaterally  Negative seated straight leg raise for bilateral lower extremities  Sensitivity to light touch intact bilateral lower extremities  2+ reflexes in the patella and Achilles  No ankle clonus     Imaging  No orders to display       No orders of the defined types were placed in this encounter

## 2019-03-06 ENCOUNTER — HOSPITAL ENCOUNTER (OUTPATIENT)
Facility: HOSPITAL | Age: 64
Setting detail: OUTPATIENT SURGERY
Discharge: HOME | End: 2019-03-06
Attending: ANESTHESIOLOGY | Admitting: ANESTHESIOLOGY
Payer: MEDICARE

## 2019-03-06 ENCOUNTER — APPOINTMENT (OUTPATIENT)
Dept: RADIOLOGY | Facility: HOSPITAL | Age: 64
End: 2019-03-06
Payer: MEDICARE

## 2019-03-06 VITALS
OXYGEN SATURATION: 96 % | TEMPERATURE: 98 F | HEART RATE: 77 BPM | RESPIRATION RATE: 18 BRPM | DIASTOLIC BLOOD PRESSURE: 72 MMHG | SYSTOLIC BLOOD PRESSURE: 144 MMHG

## 2019-03-06 PROCEDURE — 64483 NJX AA&/STRD TFRM EPI L/S 1: CPT | Performed by: ANESTHESIOLOGY

## 2019-03-06 PROCEDURE — 64484 NJX AA&/STRD TFRM EPI L/S EA: CPT | Performed by: ANESTHESIOLOGY

## 2019-03-06 RX ORDER — DEXAMETHASONE SODIUM PHOSPHATE 10 MG/ML
INJECTION, SOLUTION INTRAMUSCULAR; INTRAVENOUS AS NEEDED
Status: DISCONTINUED | OUTPATIENT
Start: 2019-03-06 | End: 2019-03-06 | Stop reason: HOSPADM

## 2019-03-06 RX ORDER — LIDOCAINE HYDROCHLORIDE 10 MG/ML
INJECTION, SOLUTION EPIDURAL; INFILTRATION; INTRACAUDAL; PERINEURAL AS NEEDED
Status: DISCONTINUED | OUTPATIENT
Start: 2019-03-06 | End: 2019-03-06 | Stop reason: HOSPADM

## 2019-03-06 NOTE — OP NOTE
OPERATIVE REPORT  PATIENT NAME: Jim Palomo    :  1955  MRN: [id number]  Pt Location: [facility] OR ROOM 01    SURGERY DATE: 3/6/2019    Surgeon(s) and Role:     * Jany Issa MD - Primary    Preop Diagnosis:  Lumbar radiculopathy [M54 16]    Post-Op Diagnosis Codes:     * Lumbar radiculopathy [M54 16]    Procedure(s) (LRB):  L4- L5 and L5-S1 transforaminal epidural steroid injection (Left)    Specimen(s):  * No specimens in log *    Estimated Blood Loss:   Minimal    Drains:  * No LDAs found *    Anesthesia Type:   Local    Operative Indications:  Lumbar radiculopathy [M54 16]      Operative Findings:  same    Complications:   None    Procedure and Technique:  Indication:  Low back and leg pain  Preoperative diagnosis:  Lumbar radiculitis  Postoperative diagnosis:  Lumbar radiculitis    Procedure: Fluoroscopically-guided left L4-5 and L5-S1 transforaminal epidural steroid injection under fluoroscopy      After discussing the risks, benefits, and alternatives to the procedure, the patient expressed understanding and wished to proceed  The patient was brought to the fluoroscopy suite and placed in the prone position  A procedural pause was conducted to verify:  correct patient identity, procedure to be performed and as applicable, correct side and site, correct patient position, and availability of implants, special equipment and special requirements  After identifying the left L4 and L5 pedicles fluoroscopically with an oblique view, the skin was sterilely prepped and draped in the usual fashion using Chloraprep skin prep  The skin and subcutaneous tissue were anesthetized with 0 5% lidocaine  A 3 5 inch 22 gauge spinal needle was then advanced under fluoroscopic guidance to the posterior aspect of the left L4-5 and L5-S1 neural foramens    Appropriate foraminal depth was determined with a lateral fluoroscopic view, and AP visualization confirmed needle positioning at approximately the 6 oclock position relative to the pedicles  After negative aspiration, 1 mL of Omnipaque 300 contrast was injected using live fluoroscopy/digital subtraction angiography, confirming appropriate transforaminal spread without evidence of intravascular or intrathecal uptake  Next, a local anesthetic test dose consisting of 1 mL of 2% lidocaine was injected through the needle at each level  After an appropriate period of observation, a directed neurological exam was performed which revealed no new neurologic deficits  Next, a 1 5 ml solution consisting of 7 5 mg of dexamethasone in sterile saline was injected slowly and incrementally into the epidural space at each level  Following the injection the needles were withdrawn slightly and flushed with lidocaine as they were fully extracted  The patient tolerated the procedure well and there were no apparent complications  The patient did not develop any new neurologic deficits  After appropriate observation, the patient was dismissed from the clinic in good condition under their own power  COMMENTS   The patient received a total steroid dose of 15 mg of dexamethasone     I was present for the entire procedure    Patient Disposition:  hemodynamically stable    SIGNATURE: Lena Neff MD  DATE: March 6, 2019  TIME: 1:09 PM

## 2019-03-06 NOTE — DISCHARGE INSTRUCTIONS
Cigarette Smoking and Your Health   AMBULATORY CARE:   Risks to your health if you smoke:  Nicotine and other chemicals found in tobacco damage every cell in your body  Even if you are a light smoker, you have an increased risk for cancer, heart disease, and lung disease  If you are pregnant or have diabetes, smoking increases your risk for complications  Benefits to your health if you stop smoking:   · You decrease respiratory symptoms such as coughing, wheezing, and shortness of breath  · You reduce your risk for cancers of the lung, mouth, throat, kidney, bladder, pancreas, stomach, and cervix  If you already have cancer, you increase the benefits of chemotherapy  You also reduce your risk for cancer returning or a second cancer from developing  · You reduce your risk for heart disease, blood clots, heart attack, and stroke  · You reduce your risk for lung infections, and diseases such as pneumonia, asthma, chronic bronchitis, and emphysema  · Your circulation improves  More oxygen can be delivered to your body  If you have diabetes, you lower your risk for complications, such as kidney, artery, and eye diseases  You also lower your risk for nerve damage  Nerve damage can lead to amputations, poor vision, and blindness  · You improve your body's ability to heal and to fight infections  Benefits to the health of others if you stop smoking:  Tobacco is harmful to nonsmokers who breathe in your secondhand smoke  The following are ways the health of others around you may improve when you stop smoking:  · You lower the risks for lung cancer and heart disease in nonsmoking adults  · If you are pregnant, you lower the risk for miscarriage, early delivery, low birth weight, and stillbirth  You also lower your baby's risk for SIDS, obesity, developmental delay, and neurobehavioral problems, such as ADHD       · If you have children, you lower their risk for ear infections, colds, pneumonia, bronchitis, and asthma  For more information and support to stop smoking:   · Smokefree  gov  Phone: 2- 646 - 503-5072  Web Address: www smokefree  [employer]  Follow up with your healthcare provider as directed:  Write down your questions so you remember to ask them during your visits  © 2017 [employer] Information is for End User's use only and may not be sold, redistributed or otherwise used for commercial purposes  All illustrations and images included in CareNotes® are the copyrighted property of [employer]  or [employer]  The above information is an  only  It is not intended as medical advice for individual conditions or treatments  Talk to your doctor, nurse or pharmacist before following any medical regimen to see if it is safe and effective for you  Medial Branch Radiofrequency Ablation     WHAT YOU NEED TO KNOW:   Medial branch radiofrequency ablation (RFA) is a procedure used to treat facet joint pain in your neck, mid back, or lower back  Facet joints are found at the back of each vertebra  A needle electrode is used to send electrical currents to the nerves in your facet joint  The electrical currents create heat that damages the nerve so it cannot send pain signals  ACTIVITY  · Do not drive or operate machinery today  · No strenuous activity today - bending, lifting, etc   · You may shower today, but do not sit in a tub of water  · Resume normal activities tomorrow as tolerated  CARE OF THE INJECTION SITE  · If you have soreness or pain, apply ice to the area today (20 minutes on/20 minutes off)  · Starting tomorrow, you may use warm, moist heat or ice if needed  · Notify [facility] if you have any of the following: redness, drainage, swelling, or fever above 100°F     SPECIAL INSTRUCTIONS  · Our office will call you tomorrow for a progress report and make an appointment for a follow up visit in 4 weeks    · If you feel a sunburn-like sensation in the area of your procedure, call our office  MEDICATIONS  · Continue to take all routine medications  · Our office may have instructed you to hold some medications  If you have a problem specifically related to your procedure, please call our office at [phone]  Problems not related to your procedure should be directed to your primary care physician

## 2019-03-08 DIAGNOSIS — R52 PAIN: ICD-10-CM

## 2019-03-08 RX ORDER — OXYCODONE HYDROCHLORIDE AND ACETAMINOPHEN 5; 325 MG/1; MG/1
1 TABLET ORAL EVERY 6 HOURS PRN
Qty: 100 TABLET | Refills: 0 | Status: SHIPPED | OUTPATIENT
Start: 2019-03-08 | End: 2019-04-08 | Stop reason: SDUPTHER

## 2019-03-13 ENCOUNTER — TELEPHONE (OUTPATIENT)
Dept: PAIN MEDICINE | Facility: CLINIC | Age: 64
End: 2019-03-13

## 2019-03-29 DIAGNOSIS — F41.9 ANXIETY: ICD-10-CM

## 2019-04-01 DIAGNOSIS — F41.9 ANXIETY: ICD-10-CM

## 2019-04-01 RX ORDER — ALPRAZOLAM 0.25 MG/1
TABLET ORAL
Qty: 90 TABLET | Refills: 0 | Status: SHIPPED | OUTPATIENT
Start: 2019-04-01 | End: 2019-04-22

## 2019-04-01 RX ORDER — ALPRAZOLAM 0.25 MG/1
0.25 TABLET ORAL 3 TIMES DAILY PRN
Qty: 90 TABLET | Refills: 5 | Status: SHIPPED | OUTPATIENT
Start: 2019-04-01 | End: 2019-09-05 | Stop reason: SDUPTHER

## 2019-04-08 DIAGNOSIS — R52 PAIN: ICD-10-CM

## 2019-04-08 RX ORDER — OXYCODONE HYDROCHLORIDE AND ACETAMINOPHEN 5; 325 MG/1; MG/1
1 TABLET ORAL EVERY 6 HOURS PRN
Qty: 100 TABLET | Refills: 0 | Status: SHIPPED | OUTPATIENT
Start: 2019-04-08 | End: 2019-05-07 | Stop reason: SDUPTHER

## 2019-04-22 ENCOUNTER — OFFICE VISIT (OUTPATIENT)
Dept: FAMILY MEDICINE CLINIC | Facility: CLINIC | Age: 64
End: 2019-04-22
Payer: MEDICARE

## 2019-04-22 VITALS
OXYGEN SATURATION: 96 % | BODY MASS INDEX: 21.62 KG/M2 | WEIGHT: 159.6 LBS | SYSTOLIC BLOOD PRESSURE: 124 MMHG | RESPIRATION RATE: 14 BRPM | HEART RATE: 80 BPM | HEIGHT: 72 IN | DIASTOLIC BLOOD PRESSURE: 70 MMHG

## 2019-04-22 DIAGNOSIS — J44.9 CHRONIC OBSTRUCTIVE PULMONARY DISEASE, UNSPECIFIED COPD TYPE (HCC): Primary | ICD-10-CM

## 2019-04-22 DIAGNOSIS — F41.9 ANXIETY: ICD-10-CM

## 2019-04-22 DIAGNOSIS — I10 BENIGN ESSENTIAL HYPERTENSION: ICD-10-CM

## 2019-04-22 DIAGNOSIS — Z11.59 ENCOUNTER FOR HEPATITIS C SCREENING TEST FOR LOW RISK PATIENT: ICD-10-CM

## 2019-04-22 PROCEDURE — 99214 OFFICE O/P EST MOD 30 MIN: CPT | Performed by: FAMILY MEDICINE

## 2019-05-07 DIAGNOSIS — R52 PAIN: ICD-10-CM

## 2019-05-08 RX ORDER — OXYCODONE HYDROCHLORIDE AND ACETAMINOPHEN 5; 325 MG/1; MG/1
1 TABLET ORAL EVERY 6 HOURS PRN
Qty: 100 TABLET | Refills: 0 | Status: SHIPPED | OUTPATIENT
Start: 2019-05-08 | End: 2019-06-06 | Stop reason: SDUPTHER

## 2019-05-08 RX ORDER — OXYCODONE HYDROCHLORIDE AND ACETAMINOPHEN 5; 325 MG/1; MG/1
1 TABLET ORAL EVERY 6 HOURS PRN
Qty: 100 TABLET | Refills: 0 | OUTPATIENT
Start: 2019-05-08

## 2019-05-20 DIAGNOSIS — R52 PAIN: Primary | ICD-10-CM

## 2019-06-03 DIAGNOSIS — G47.00 INSOMNIA, UNSPECIFIED TYPE: ICD-10-CM

## 2019-06-03 RX ORDER — ZOLPIDEM TARTRATE 5 MG/1
TABLET ORAL
Qty: 30 TABLET | Refills: 5 | Status: SHIPPED | OUTPATIENT
Start: 2019-06-03 | End: 2019-08-14

## 2019-06-06 DIAGNOSIS — R52 PAIN: ICD-10-CM

## 2019-06-06 RX ORDER — OXYCODONE HYDROCHLORIDE AND ACETAMINOPHEN 5; 325 MG/1; MG/1
1 TABLET ORAL EVERY 6 HOURS PRN
Qty: 100 TABLET | Refills: 0 | Status: SHIPPED | OUTPATIENT
Start: 2019-06-06 | End: 2019-07-03 | Stop reason: SDUPTHER

## 2019-06-24 DIAGNOSIS — R52 PAIN: ICD-10-CM

## 2019-06-24 DIAGNOSIS — I10 ESSENTIAL HYPERTENSION: ICD-10-CM

## 2019-06-24 RX ORDER — CYCLOBENZAPRINE HCL 10 MG
10 TABLET ORAL 3 TIMES DAILY PRN
Qty: 30 TABLET | Refills: 0 | Status: SHIPPED | OUTPATIENT
Start: 2019-06-24 | End: 2019-08-01 | Stop reason: SDUPTHER

## 2019-06-24 RX ORDER — AMLODIPINE BESYLATE 5 MG/1
5 TABLET ORAL DAILY
Qty: 30 TABLET | Refills: 0 | Status: SHIPPED | OUTPATIENT
Start: 2019-06-24 | End: 2019-08-19 | Stop reason: SDUPTHER

## 2019-06-24 RX ORDER — ACETAMINOPHEN AND CODEINE PHOSPHATE 60; 300 MG/1; MG/1
1 TABLET ORAL EVERY 6 HOURS PRN
Qty: 120 TABLET | Refills: 0 | Status: SHIPPED | OUTPATIENT
Start: 2019-06-24 | End: 2019-08-19 | Stop reason: SDUPTHER

## 2019-06-27 DIAGNOSIS — J44.9 CHRONIC OBSTRUCTIVE PULMONARY DISEASE, UNSPECIFIED COPD TYPE (HCC): ICD-10-CM

## 2019-07-03 DIAGNOSIS — R52 PAIN: ICD-10-CM

## 2019-07-03 RX ORDER — OXYCODONE HYDROCHLORIDE AND ACETAMINOPHEN 5; 325 MG/1; MG/1
1 TABLET ORAL EVERY 6 HOURS PRN
Qty: 100 TABLET | Refills: 0 | Status: SHIPPED | OUTPATIENT
Start: 2019-07-03 | End: 2019-07-23 | Stop reason: SDUPTHER

## 2019-07-22 ENCOUNTER — OFFICE VISIT (OUTPATIENT)
Dept: FAMILY MEDICINE CLINIC | Facility: CLINIC | Age: 64
End: 2019-07-22
Payer: MEDICARE

## 2019-07-22 VITALS — DIASTOLIC BLOOD PRESSURE: 64 MMHG | HEART RATE: 72 BPM | SYSTOLIC BLOOD PRESSURE: 110 MMHG | OXYGEN SATURATION: 96 %

## 2019-07-22 DIAGNOSIS — I10 BENIGN ESSENTIAL HYPERTENSION: ICD-10-CM

## 2019-07-22 DIAGNOSIS — J44.9 CHRONIC OBSTRUCTIVE PULMONARY DISEASE, UNSPECIFIED COPD TYPE (HCC): Primary | ICD-10-CM

## 2019-07-22 DIAGNOSIS — E78.2 MIXED HYPERLIPIDEMIA: ICD-10-CM

## 2019-07-22 DIAGNOSIS — M17.0 PRIMARY OSTEOARTHRITIS OF BOTH KNEES: ICD-10-CM

## 2019-07-22 PROCEDURE — 99214 OFFICE O/P EST MOD 30 MIN: CPT | Performed by: FAMILY MEDICINE

## 2019-07-22 RX ORDER — ATORVASTATIN CALCIUM 40 MG/1
40 TABLET, FILM COATED ORAL DAILY
Qty: 30 TABLET | Refills: 5 | Status: SHIPPED | OUTPATIENT
Start: 2019-07-22 | End: 2019-12-17 | Stop reason: SDUPTHER

## 2019-07-22 NOTE — PROGRESS NOTES
Assessment/Plan:    Chronic obstructive pulmonary disease (HCC)  Stable  Continue current  Urged smoking cessation  Benign essential hypertension  Stable  Continue current  Osteoarthritis of both knees  Stable  Continue current  Diagnoses and all orders for this visit:    Chronic obstructive pulmonary disease, unspecified COPD type ([facility] [address] )    Benign essential hypertension    Primary osteoarthritis of both knees    Mixed hyperlipidemia  -     atorvastatin (LIPITOR) 40 mg tablet; Take 1 tablet (40 mg total) by mouth daily          Subjective:      Patient ID: Merlinda Parkin is a 70 y o  male  Patient presents to the office for routine follow up and review of labs  He offers no complaints at this time  HTN well controlled with Norvasc  He denies side effects of medications  He does admit to shortness of breath which is managed with nebulizer use at home  He denies chest pain, leg swelling, headaches, palpitations  GERD well controlled with Protonix  Patient is tolerating medication well without side effects  PMHx is significant for arthritis and chronic low back pain which is well controlled with Percocet  He is tolerating the medication well without side effects  He states that his back pain is stable  He wears a back brace which offers some relief  He admits to radiation of pain along the left leg  He denies any lower extremity numbness or weakness, or loss of bladder/bowel function  Back Pain   This is a recurrent problem  The current episode started more than 1 year ago  The problem occurs constantly  The problem has been gradually worsening since onset  The pain is present in the sacro-iliac  The quality of the pain is described as aching  The pain radiates to the left foot, left knee and left thigh  The pain is at a severity of 8/10  The pain is the same all the time  The symptoms are aggravated by bending, position and twisting  Stiffness is present in the morning   Associated symptoms include leg pain, pelvic pain and weight loss  Pertinent negatives include no abdominal pain, chest pain or headaches  Risk factors include lack of exercise and sedentary lifestyle  The following portions of the patient's history were reviewed and updated as appropriate:   He  has a past medical history of Hyperthyroidism  He   Patient Active Problem List    Diagnosis Date Noted    Lumbar radiculopathy 02/26/2019    Screening for AAA (abdominal aortic aneurysm) 01/18/2019    Medicare annual wellness visit, initial 01/18/2019    Low back pain 12/19/2018    Displacement of lumbar intervertebral disc without myelopathy 12/19/2018    Lumbar spondylosis 12/14/2018    Bulging of lumbar intervertebral disc 12/14/2018    Screening for neurological condition 10/18/2018    Encounter for hepatitis C screening test for low risk patient 04/13/2018    Pain 04/13/2018    Benign essential hypertension 01/15/2018    Osteoarthritis of both knees 10/18/2017    Other instability, right knee 10/18/2017    Left knee pain 09/09/2016    Pain of left hip 09/09/2016    Neck pain 08/05/2016    Pain in joint of left shoulder 08/05/2016    Other chest pain 06/07/2016    Chronic low back pain 10/08/2015    Insomnia 09/29/2015    Early satiety 02/26/2015    GERD (gastroesophageal reflux disease) 10/31/2014    Headache 05/16/2014    Incisional hernia 10/11/2013    Weight loss, non-intentional 10/04/2013    Fatigue 06/26/2013    Anxiety 11/14/2012    Chronic obstructive pulmonary disease ([facility] [address] ) 11/14/2012     He  has a past surgical history that includes Inguinal hernia repair (Bilateral); Epidural block injection (N/A, 1/17/2019); Epidural block injection (Left, 3/6/2019); and FL guided needle plac bx/asp/inj (3/6/2019)  His family history includes Heart disease in his father, maternal grandfather, maternal grandmother, mother, paternal grandfather, and paternal grandmother    He  reports that he has been smoking cigarettes  He has been smoking about 1 00 pack per day  He has never used smokeless tobacco  He reports that he does not drink alcohol or use drugs  Current Outpatient Medications   Medication Sig Dispense Refill    acetaminophen-codeine (TYLENOL #4) 300-60 MG per tablet Take 1 tablet by mouth every 6 (six) hours as needed for moderate pain 120 tablet 0    albuterol (2 5 mg/3 mL) 0 083 % nebulizer solution Take 1 vial (2 5 mg total) by nebulization every 4 (four) hours as needed for wheezing or shortness of breath 100 vial 5    ALPRAZolam (XANAX) 0 25 mg tablet Take 1 tablet (0 25 mg total) by mouth 3 (three) times a day as needed for anxiety 90 tablet 5    amLODIPine (NORVASC) 5 mg tablet Take 1 tablet (5 mg total) by mouth daily 30 tablet 0    aspirin 81 MG tablet Take by mouth      atorvastatin (LIPITOR) 40 mg tablet Take 1 tablet (40 mg total) by mouth daily 30 tablet 5    cyclobenzaprine (FLEXERIL) 10 mg tablet Take 1 tablet (10 mg total) by mouth 3 (three) times a day as needed (prn pain) 30 tablet 0    diclofenac sodium (VOLTAREN) 1 % Apply 4 g topically 4 (four) times a day 5 Tube 5    DULoxetine (CYMBALTA) 60 mg delayed release capsule Take 1 capsule (60 mg total) by mouth daily 30 capsule 5    NARCAN 4 MG/0 1ML LIQD   0    oxyCODONE-acetaminophen (PERCOCET) 5-325 mg per tablet Take 1 tablet by mouth every 6 (six) hours as needed for moderate pain or severe painMax Daily Amount: 4 tablets 100 tablet 0    pantoprazole (PROTONIX) 40 mg tablet Take 1 tablet (40 mg total) by mouth daily 30 tablet 5    VENTOLIN  (90 Base) MCG/ACT inhaler inhale 2 puffs by mouth every 6 hours if needed for wheezing 18 g 5    zolpidem (AMBIEN) 5 mg tablet take 1 tablet by mouth at bedtime if needed for sleep 30 tablet 5     No current facility-administered medications for this visit        Current Outpatient Medications on File Prior to Visit   Medication Sig    acetaminophen-codeine (TYLENOL #4) 300-60 MG per tablet Take 1 tablet by mouth every 6 (six) hours as needed for moderate pain    albuterol (2 5 mg/3 mL) 0 083 % nebulizer solution Take 1 vial (2 5 mg total) by nebulization every 4 (four) hours as needed for wheezing or shortness of breath    ALPRAZolam (XANAX) 0 25 mg tablet Take 1 tablet (0 25 mg total) by mouth 3 (three) times a day as needed for anxiety    amLODIPine (NORVASC) 5 mg tablet Take 1 tablet (5 mg total) by mouth daily    aspirin 81 MG tablet Take by mouth    cyclobenzaprine (FLEXERIL) 10 mg tablet Take 1 tablet (10 mg total) by mouth 3 (three) times a day as needed (prn pain)    diclofenac sodium (VOLTAREN) 1 % Apply 4 g topically 4 (four) times a day    DULoxetine (CYMBALTA) 60 mg delayed release capsule Take 1 capsule (60 mg total) by mouth daily    NARCAN 4 MG/0 1ML LIQD     oxyCODONE-acetaminophen (PERCOCET) 5-325 mg per tablet Take 1 tablet by mouth every 6 (six) hours as needed for moderate pain or severe painMax Daily Amount: 4 tablets    pantoprazole (PROTONIX) 40 mg tablet Take 1 tablet (40 mg total) by mouth daily    VENTOLIN  (90 Base) MCG/ACT inhaler inhale 2 puffs by mouth every 6 hours if needed for wheezing    zolpidem (AMBIEN) 5 mg tablet take 1 tablet by mouth at bedtime if needed for sleep     No current facility-administered medications on file prior to visit  He has No Known Allergies       Review of Systems   Constitutional: Positive for weight loss  Negative for fatigue  Eyes: Negative for visual disturbance  Respiratory: Positive for shortness of breath (chronic)  Negative for cough  Cardiovascular: Negative for chest pain, palpitations and leg swelling  Gastrointestinal: Negative for abdominal pain, diarrhea, nausea and vomiting  Genitourinary: Positive for pelvic pain  Musculoskeletal: Positive for back pain  Neurological: Negative for headaches  All other systems reviewed and are negative          Objective:      BP 110/64   Pulse 72   SpO2 96%          Physical Exam   Constitutional: He is oriented to person, place, and time  He appears well-developed and well-nourished  HENT:   Head: Normocephalic and atraumatic  Eyes: Conjunctivae and EOM are normal    Neck: Normal range of motion  Neck supple  Cardiovascular: Normal rate, regular rhythm, normal heart sounds and intact distal pulses  No murmur heard  Pulmonary/Chest: Effort normal and breath sounds normal  No stridor  No respiratory distress  He has no wheezes  He has no rales  He exhibits no tenderness  Abdominal: Soft  Bowel sounds are normal  He exhibits no distension  There is no tenderness  Musculoskeletal: Normal range of motion  Neurological: He is alert and oriented to person, place, and time  Skin: Skin is warm and dry  Capillary refill takes less than 2 seconds  Psychiatric: He has a normal mood and affect  His behavior is normal  Judgment and thought content normal    Nursing note and vitals reviewed

## 2019-07-23 DIAGNOSIS — R52 PAIN: ICD-10-CM

## 2019-07-23 RX ORDER — OXYCODONE HYDROCHLORIDE AND ACETAMINOPHEN 5; 325 MG/1; MG/1
1 TABLET ORAL EVERY 6 HOURS PRN
Qty: 100 TABLET | Refills: 0 | Status: SHIPPED | OUTPATIENT
Start: 2019-07-27 | End: 2019-08-26 | Stop reason: SDUPTHER

## 2019-08-01 DIAGNOSIS — R52 PAIN: ICD-10-CM

## 2019-08-01 RX ORDER — CYCLOBENZAPRINE HCL 10 MG
TABLET ORAL
Qty: 30 TABLET | Refills: 0 | Status: SHIPPED | OUTPATIENT
Start: 2019-08-01 | End: 2019-08-19 | Stop reason: SDUPTHER

## 2019-08-14 DIAGNOSIS — G47.00 INSOMNIA, UNSPECIFIED TYPE: Primary | ICD-10-CM

## 2019-08-14 RX ORDER — ZOLPIDEM TARTRATE 10 MG/1
10 TABLET ORAL
Qty: 30 TABLET | Refills: 5 | Status: SHIPPED | OUTPATIENT
Start: 2019-08-14 | End: 2020-01-21

## 2019-08-19 DIAGNOSIS — R52 PAIN: ICD-10-CM

## 2019-08-19 DIAGNOSIS — I10 ESSENTIAL HYPERTENSION: ICD-10-CM

## 2019-08-19 RX ORDER — ACETAMINOPHEN AND CODEINE PHOSPHATE 60; 300 MG/1; MG/1
1 TABLET ORAL EVERY 6 HOURS PRN
Qty: 120 TABLET | Refills: 0 | Status: SHIPPED | OUTPATIENT
Start: 2019-08-19 | End: 2019-10-05 | Stop reason: SDUPTHER

## 2019-08-19 RX ORDER — AMLODIPINE BESYLATE 5 MG/1
5 TABLET ORAL DAILY
Qty: 30 TABLET | Refills: 0 | Status: SHIPPED | OUTPATIENT
Start: 2019-08-19 | End: 2019-10-03 | Stop reason: SDUPTHER

## 2019-08-19 RX ORDER — CYCLOBENZAPRINE HCL 10 MG
10 TABLET ORAL 3 TIMES DAILY PRN
Qty: 30 TABLET | Refills: 0 | Status: SHIPPED | OUTPATIENT
Start: 2019-08-19 | End: 2019-09-16 | Stop reason: SDUPTHER

## 2019-08-22 DIAGNOSIS — R52 PAIN: ICD-10-CM

## 2019-08-22 DIAGNOSIS — I10 BENIGN ESSENTIAL HYPERTENSION: ICD-10-CM

## 2019-08-26 DIAGNOSIS — R52 PAIN: ICD-10-CM

## 2019-08-26 DIAGNOSIS — I10 BENIGN ESSENTIAL HYPERTENSION: ICD-10-CM

## 2019-08-26 RX ORDER — OXYCODONE HYDROCHLORIDE AND ACETAMINOPHEN 5; 325 MG/1; MG/1
1 TABLET ORAL EVERY 6 HOURS PRN
Qty: 100 TABLET | Refills: 0 | Status: SHIPPED | OUTPATIENT
Start: 2019-08-26 | End: 2019-10-22 | Stop reason: ALTCHOICE

## 2019-08-26 RX ORDER — OXYCODONE HYDROCHLORIDE AND ACETAMINOPHEN 5; 325 MG/1; MG/1
1 TABLET ORAL EVERY 6 HOURS PRN
Qty: 100 TABLET | Refills: 0 | Status: SHIPPED | OUTPATIENT
Start: 2019-08-26 | End: 2019-09-23 | Stop reason: SDUPTHER

## 2019-08-26 RX ORDER — ALBUTEROL SULFATE 2.5 MG/3ML
2.5 SOLUTION RESPIRATORY (INHALATION) EVERY 4 HOURS PRN
Qty: 100 VIAL | Refills: 0 | Status: SHIPPED | OUTPATIENT
Start: 2019-08-26 | End: 2019-10-22 | Stop reason: ALTCHOICE

## 2019-08-26 RX ORDER — ALBUTEROL SULFATE 2.5 MG/3ML
2.5 SOLUTION RESPIRATORY (INHALATION) EVERY 4 HOURS PRN
Qty: 100 VIAL | Refills: 0 | Status: SHIPPED | OUTPATIENT
Start: 2019-08-26 | End: 2019-12-03 | Stop reason: SDUPTHER

## 2019-08-26 RX ORDER — DULOXETIN HYDROCHLORIDE 60 MG/1
60 CAPSULE, DELAYED RELEASE ORAL DAILY
Qty: 30 CAPSULE | Refills: 0 | Status: SHIPPED | OUTPATIENT
Start: 2019-08-26 | End: 2019-11-10 | Stop reason: SDUPTHER

## 2019-08-26 RX ORDER — DULOXETIN HYDROCHLORIDE 60 MG/1
60 CAPSULE, DELAYED RELEASE ORAL DAILY
Qty: 30 CAPSULE | Refills: 0 | Status: SHIPPED | OUTPATIENT
Start: 2019-08-26 | End: 2019-10-22 | Stop reason: ALTCHOICE

## 2019-09-04 DIAGNOSIS — F41.9 ANXIETY: ICD-10-CM

## 2019-09-04 DIAGNOSIS — R52 PAIN: ICD-10-CM

## 2019-09-04 RX ORDER — ALPRAZOLAM 0.25 MG/1
0.25 TABLET ORAL 3 TIMES DAILY PRN
Qty: 90 TABLET | Refills: 0 | OUTPATIENT
Start: 2019-09-04

## 2019-09-04 RX ORDER — PANTOPRAZOLE SODIUM 40 MG/1
40 TABLET, DELAYED RELEASE ORAL DAILY
Qty: 30 TABLET | Refills: 0 | Status: SHIPPED | OUTPATIENT
Start: 2019-09-04 | End: 2019-12-08 | Stop reason: SDUPTHER

## 2019-09-05 DIAGNOSIS — F41.9 ANXIETY: ICD-10-CM

## 2019-09-06 RX ORDER — ALPRAZOLAM 0.25 MG/1
0.25 TABLET ORAL 3 TIMES DAILY PRN
Qty: 90 TABLET | Refills: 5 | Status: SHIPPED | OUTPATIENT
Start: 2019-09-06 | End: 2020-03-31

## 2019-09-16 DIAGNOSIS — R52 PAIN: ICD-10-CM

## 2019-09-16 RX ORDER — CYCLOBENZAPRINE HCL 10 MG
TABLET ORAL
Qty: 30 TABLET | Refills: 0 | Status: SHIPPED | OUTPATIENT
Start: 2019-09-16 | End: 2019-10-18 | Stop reason: SDUPTHER

## 2019-09-23 DIAGNOSIS — R52 PAIN: ICD-10-CM

## 2019-09-23 RX ORDER — OXYCODONE HYDROCHLORIDE AND ACETAMINOPHEN 5; 325 MG/1; MG/1
1 TABLET ORAL EVERY 6 HOURS PRN
Qty: 100 TABLET | Refills: 0 | Status: SHIPPED | OUTPATIENT
Start: 2019-09-23 | End: 2019-10-21 | Stop reason: SDUPTHER

## 2019-10-03 DIAGNOSIS — I10 ESSENTIAL HYPERTENSION: ICD-10-CM

## 2019-10-03 RX ORDER — AMLODIPINE BESYLATE 5 MG/1
TABLET ORAL
Qty: 30 TABLET | Refills: 0 | Status: SHIPPED | OUTPATIENT
Start: 2019-10-03 | End: 2019-10-31 | Stop reason: SDUPTHER

## 2019-10-05 DIAGNOSIS — J44.9 CHRONIC OBSTRUCTIVE PULMONARY DISEASE, UNSPECIFIED COPD TYPE (HCC): ICD-10-CM

## 2019-10-05 DIAGNOSIS — R52 PAIN: ICD-10-CM

## 2019-10-07 RX ORDER — ACETAMINOPHEN AND CODEINE PHOSPHATE 60; 300 MG/1; MG/1
1 TABLET ORAL EVERY 6 HOURS PRN
Qty: 120 TABLET | Refills: 0 | Status: SHIPPED | OUTPATIENT
Start: 2019-10-07 | End: 2019-11-10 | Stop reason: SDUPTHER

## 2019-10-07 RX ORDER — ALBUTEROL SULFATE 90 UG/1
2 AEROSOL, METERED RESPIRATORY (INHALATION) EVERY 4 HOURS PRN
Qty: 18 G | Refills: 5 | Status: SHIPPED | OUTPATIENT
Start: 2019-10-07 | End: 2020-01-21 | Stop reason: SDUPTHER

## 2019-10-18 DIAGNOSIS — R52 PAIN: ICD-10-CM

## 2019-10-18 RX ORDER — CYCLOBENZAPRINE HCL 10 MG
TABLET ORAL
Qty: 30 TABLET | Refills: 0 | Status: SHIPPED | OUTPATIENT
Start: 2019-10-18 | End: 2019-11-21 | Stop reason: SDUPTHER

## 2019-10-21 DIAGNOSIS — R52 PAIN: ICD-10-CM

## 2019-10-21 RX ORDER — OXYCODONE HYDROCHLORIDE AND ACETAMINOPHEN 5; 325 MG/1; MG/1
1 TABLET ORAL EVERY 6 HOURS PRN
Qty: 100 TABLET | Refills: 0 | Status: SHIPPED | OUTPATIENT
Start: 2019-10-21 | End: 2019-11-18 | Stop reason: SDUPTHER

## 2019-10-22 ENCOUNTER — OFFICE VISIT (OUTPATIENT)
Dept: FAMILY MEDICINE CLINIC | Facility: CLINIC | Age: 64
End: 2019-10-22
Payer: MEDICARE

## 2019-10-22 VITALS
HEART RATE: 75 BPM | SYSTOLIC BLOOD PRESSURE: 122 MMHG | WEIGHT: 162 LBS | OXYGEN SATURATION: 96 % | RESPIRATION RATE: 16 BRPM | HEIGHT: 72 IN | BODY MASS INDEX: 21.94 KG/M2 | DIASTOLIC BLOOD PRESSURE: 70 MMHG

## 2019-10-22 DIAGNOSIS — K21.9 GASTROESOPHAGEAL REFLUX DISEASE, ESOPHAGITIS PRESENCE NOT SPECIFIED: ICD-10-CM

## 2019-10-22 DIAGNOSIS — M17.0 PRIMARY OSTEOARTHRITIS OF BOTH KNEES: ICD-10-CM

## 2019-10-22 DIAGNOSIS — G89.29 CHRONIC BILATERAL LOW BACK PAIN WITHOUT SCIATICA: ICD-10-CM

## 2019-10-22 DIAGNOSIS — M54.16 LUMBAR RADICULOPATHY: ICD-10-CM

## 2019-10-22 DIAGNOSIS — J44.9 CHRONIC OBSTRUCTIVE PULMONARY DISEASE, UNSPECIFIED COPD TYPE (HCC): Primary | ICD-10-CM

## 2019-10-22 DIAGNOSIS — I10 BENIGN ESSENTIAL HYPERTENSION: ICD-10-CM

## 2019-10-22 DIAGNOSIS — M54.50 CHRONIC BILATERAL LOW BACK PAIN WITHOUT SCIATICA: ICD-10-CM

## 2019-10-22 DIAGNOSIS — Z12.2 ENCOUNTER FOR SCREENING FOR LUNG CANCER: ICD-10-CM

## 2019-10-22 DIAGNOSIS — F11.20 UNCOMPLICATED OPIOID DEPENDENCE (HCC): Primary | ICD-10-CM

## 2019-10-22 PROCEDURE — 99214 OFFICE O/P EST MOD 30 MIN: CPT | Performed by: FAMILY MEDICINE

## 2019-10-22 PROCEDURE — 80365 DRUG SCREENING OXYCODONE: CPT

## 2019-10-22 PROCEDURE — 80307 DRUG TEST PRSMV CHEM ANLYZR: CPT | Performed by: FAMILY MEDICINE

## 2019-10-22 RX ORDER — ZOLPIDEM TARTRATE 5 MG/1
TABLET ORAL
Refills: 0 | COMMUNITY
Start: 2019-10-03 | End: 2020-01-21

## 2019-10-22 NOTE — PROGRESS NOTES
Assessment/Plan:    GERD (gastroesophageal reflux disease)  Stable  Continue current  Chronic obstructive pulmonary disease (HCC)  Stable  Continue current  Benign essential hypertension  Stable  Continue current  Lumbar radiculopathy  Stable  Continue current  Diagnoses and all orders for this visit:    Chronic obstructive pulmonary disease, unspecified COPD type ([facility] )  -     fluticasone-umeclidinium-vilanterol (TRELEGY) 100-62 5-25 MCG/INH inhaler; Inhale 1 puff daily Rinse mouth after use  -     Complete PFT with post bronchodilator; Future  -     CT chest wo contrast; Future    Encounter for screening for lung cancer  -     CT chest wo contrast; Future    Gastroesophageal reflux disease, esophagitis presence not specified    Benign essential hypertension    Lumbar radiculopathy    Primary osteoarthritis of both knees    Chronic bilateral low back pain without sciatica    Other orders  -     Influenza Vac Split Quad (FLUARIX QUADRIVALENT IM); inject 0 5 milliliters intramuscularly  -     zolpidem (AMBIEN) 5 mg tablet          Subjective:      Patient ID: Noam Sanchez is a 75 y o  male  He smokes about 1PPD  He has chronic cough  He had moderate COPD or spirometry in 9/2016  He has no fevers or chills  He has no increased sputum production  He is up-to-date on Pneumovax and his flu shot for the year  He has chronic knee and low back pain  He is wearing a knee brace and back brace  He is also wearing a wrist brace  He is taking nonsteroidal anti-inflammatories, Tylenol 3 for mild-to-moderate pain, and Percocet for moderate to severe pain  He has assigned treatment agreement in the chart and urine toxicology consistent with regular use of his prescribed medications  We queried the prescription drug monitoring program to assure that we are the only provider writing for this medication  He is seen pain management and undergone injections for his back    He has gone through formal physical therapy  We have discussed the long-term risks and benefits of opiate based pain medication and he and I are in agreement that the benefits outweigh the risks in his case  He has no side effects from his current regimen  He has chronic GERD  He has no alarm signs  He is up-to-date with his screenings  His blood pressure is at goal on his current regimen  He has no chest pain or shortness of breath  He has no headache or vision changes  He had lab work done in August   He screen negative for type 2 diabetes mellitus  His renal function is excellent  His lipids are at goal       The following portions of the patient's history were reviewed and updated as appropriate:   He  has a past medical history of Hyperthyroidism    He   Patient Active Problem List    Diagnosis Date Noted    Lumbar radiculopathy 02/26/2019    Screening for AAA (abdominal aortic aneurysm) 01/18/2019    Medicare annual wellness visit, initial 01/18/2019    Low back pain 12/19/2018    Displacement of lumbar intervertebral disc without myelopathy 12/19/2018    Lumbar spondylosis 12/14/2018    Bulging of lumbar intervertebral disc 12/14/2018    Screening for neurological condition 10/18/2018    Encounter for hepatitis C screening test for low risk patient 04/13/2018    Pain 04/13/2018    Benign essential hypertension 01/15/2018    Osteoarthritis of both knees 10/18/2017    Other instability, right knee 10/18/2017    Left knee pain 09/09/2016    Pain of left hip 09/09/2016    Neck pain 08/05/2016    Pain in joint of left shoulder 08/05/2016    Other chest pain 06/07/2016    Chronic low back pain 10/08/2015    Insomnia 09/29/2015    Early satiety 02/26/2015    GERD (gastroesophageal reflux disease) 10/31/2014    Headache 05/16/2014    Incisional hernia 10/11/2013    Weight loss, non-intentional 10/04/2013    Fatigue 06/26/2013    Anxiety 11/14/2012    Chronic obstructive pulmonary disease ([facility] [address] ) 11/14/2012     He  has a past surgical history that includes Inguinal hernia repair (Bilateral); Epidural block injection (N/A, 1/17/2019); Epidural block injection (Left, 3/6/2019); and FL guided needle plac bx/asp/inj (3/6/2019)  His family history includes Heart disease in his father, maternal grandfather, maternal grandmother, mother, paternal grandfather, and paternal grandmother  He  reports that he has been smoking cigarettes  He has been smoking about 1 00 pack per day  He has never used smokeless tobacco  He reports that he does not drink alcohol or use drugs  Current Outpatient Medications   Medication Sig Dispense Refill    acetaminophen-codeine (TYLENOL #4) 300-60 MG per tablet Take 1 tablet by mouth every 6 (six) hours as needed for moderate pain 120 tablet 0    albuterol (2 5 mg/3 mL) 0 083 % nebulizer solution Take 1 vial (2 5 mg total) by nebulization every 4 (four) hours as needed for wheezing or shortness of breath 100 vial 0    albuterol (VENTOLIN HFA) 90 mcg/act inhaler Inhale 2 puffs every 4 (four) hours as needed for wheezing 18 g 5    ALPRAZolam (XANAX) 0 25 mg tablet Take 1 tablet (0 25 mg total) by mouth 3 (three) times a day as needed for anxiety 90 tablet 5    amLODIPine (NORVASC) 5 mg tablet take 1 tablet by mouth once daily 30 tablet 0    atorvastatin (LIPITOR) 40 mg tablet Take 1 tablet (40 mg total) by mouth daily 30 tablet 5    cyclobenzaprine (FLEXERIL) 10 mg tablet take 1 tablet by mouth three times a day if needed FOR MUSCLE SPASMS 30 tablet 0    diclofenac sodium (VOLTAREN) 1 % Apply 4 g topically 4 (four) times a day 5 Tube 0    DULoxetine (CYMBALTA) 60 mg delayed release capsule Take 1 capsule (60 mg total) by mouth daily 30 capsule 0    fluticasone-umeclidinium-vilanterol (TRELEGY) 100-62 5-25 MCG/INH inhaler Inhale 1 puff daily Rinse mouth after use   1 Inhaler 5    Influenza Vac Split Quad (FLUARIX QUADRIVALENT IM) inject 0 5 milliliters intramuscularly  0    oxyCODONE-acetaminophen (PERCOCET) 5-325 mg per tablet Take 1 tablet by mouth every 6 (six) hours as needed for moderate pain or severe painMax Daily Amount: 4 tablets 100 tablet 0    pantoprazole (PROTONIX) 40 mg tablet Take 1 tablet (40 mg total) by mouth daily 30 tablet 0    zolpidem (AMBIEN) 10 mg tablet Take 1 tablet (10 mg total) by mouth daily at bedtime as needed for sleep 30 tablet 5    zolpidem (AMBIEN) 5 mg tablet   0     No current facility-administered medications for this visit        Current Outpatient Medications on File Prior to Visit   Medication Sig    acetaminophen-codeine (TYLENOL #4) 300-60 MG per tablet Take 1 tablet by mouth every 6 (six) hours as needed for moderate pain    albuterol (2 5 mg/3 mL) 0 083 % nebulizer solution Take 1 vial (2 5 mg total) by nebulization every 4 (four) hours as needed for wheezing or shortness of breath    albuterol (VENTOLIN HFA) 90 mcg/act inhaler Inhale 2 puffs every 4 (four) hours as needed for wheezing    ALPRAZolam (XANAX) 0 25 mg tablet Take 1 tablet (0 25 mg total) by mouth 3 (three) times a day as needed for anxiety    amLODIPine (NORVASC) 5 mg tablet take 1 tablet by mouth once daily    atorvastatin (LIPITOR) 40 mg tablet Take 1 tablet (40 mg total) by mouth daily    cyclobenzaprine (FLEXERIL) 10 mg tablet take 1 tablet by mouth three times a day if needed FOR MUSCLE SPASMS    diclofenac sodium (VOLTAREN) 1 % Apply 4 g topically 4 (four) times a day    DULoxetine (CYMBALTA) 60 mg delayed release capsule Take 1 capsule (60 mg total) by mouth daily    Influenza Vac Split Quad (FLUARIX QUADRIVALENT IM) inject 0 5 milliliters intramuscularly    oxyCODONE-acetaminophen (PERCOCET) 5-325 mg per tablet Take 1 tablet by mouth every 6 (six) hours as needed for moderate pain or severe painMax Daily Amount: 4 tablets    pantoprazole (PROTONIX) 40 mg tablet Take 1 tablet (40 mg total) by mouth daily    zolpidem (AMBIEN) 10 mg tablet Take 1 tablet (10 mg total) by mouth daily at bedtime as needed for sleep    zolpidem (AMBIEN) 5 mg tablet     [DISCONTINUED] albuterol (2 5 mg/3 mL) 0 083 % nebulizer solution Take 1 vial (2 5 mg total) by nebulization every 4 (four) hours as needed for wheezing or shortness of breath    [DISCONTINUED] aspirin 81 MG tablet Take by mouth    [DISCONTINUED] DULoxetine (CYMBALTA) 60 mg delayed release capsule Take 1 capsule (60 mg total) by mouth daily    [DISCONTINUED] NARCAN 4 MG/0 1ML LIQD     [DISCONTINUED] oxyCODONE-acetaminophen (PERCOCET) 5-325 mg per tablet Take 1 tablet by mouth every 6 (six) hours as needed for moderate pain or severe painMax Daily Amount: 4 tablets     No current facility-administered medications on file prior to visit  He has No Known Allergies       Review of Systems   Respiratory: Positive for cough, shortness of breath and wheezing  Musculoskeletal: Positive for back pain and myalgias  All other systems reviewed and are negative  Objective:      /70   Pulse 75   Resp 16   Ht 6' (1 829 m)   Wt 73 5 kg (162 lb)   SpO2 96%   BMI 21 97 kg/m²          Physical Exam   Constitutional: He is oriented to person, place, and time  He appears well-developed and well-nourished  Neck: Normal range of motion  Neck supple  Cardiovascular: Normal rate, regular rhythm, normal heart sounds and intact distal pulses  Pulmonary/Chest: Effort normal and breath sounds normal    Abdominal: Soft  Bowel sounds are normal    Musculoskeletal: Normal range of motion  Neurological: He is alert and oriented to person, place, and time  Skin: Skin is warm and dry  Psychiatric: He has a normal mood and affect  His behavior is normal  Judgment and thought content normal    Nursing note and vitals reviewed

## 2019-10-24 ENCOUNTER — HOSPITAL ENCOUNTER (OUTPATIENT)
Dept: PULMONOLOGY | Facility: HOSPITAL | Age: 64
Discharge: HOME | End: 2019-10-24
Attending: FAMILY MEDICINE
Payer: MEDICARE

## 2019-10-24 DIAGNOSIS — J44.9 CHRONIC OBSTRUCTIVE PULMONARY DISEASE, UNSPECIFIED COPD TYPE (HCC): ICD-10-CM

## 2019-10-24 PROCEDURE — 94760 N-INVAS EAR/PLS OXIMETRY 1: CPT

## 2019-10-24 PROCEDURE — 94060 EVALUATION OF WHEEZING: CPT | Performed by: INTERNAL MEDICINE

## 2019-10-24 PROCEDURE — 94726 PLETHYSMOGRAPHY LUNG VOLUMES: CPT | Performed by: INTERNAL MEDICINE

## 2019-10-24 PROCEDURE — 94729 DIFFUSING CAPACITY: CPT | Performed by: INTERNAL MEDICINE

## 2019-10-24 PROCEDURE — 94010 BREATHING CAPACITY TEST: CPT

## 2019-10-24 PROCEDURE — 94729 DIFFUSING CAPACITY: CPT

## 2019-10-24 PROCEDURE — 94060 EVALUATION OF WHEEZING: CPT

## 2019-10-24 RX ORDER — ALBUTEROL SULFATE 2.5 MG/3ML
2.5 SOLUTION RESPIRATORY (INHALATION) ONCE AS NEEDED
Status: COMPLETED | OUTPATIENT
Start: 2019-10-24 | End: 2019-10-24

## 2019-10-24 RX ADMIN — ALBUTEROL SULFATE 2.5 MG: 2.5 SOLUTION RESPIRATORY (INHALATION) at 07:49

## 2019-10-28 LAB
AMPHETAMINES UR QL SCN: NEGATIVE NG/ML
BARBITURATES UR QL SCN: NEGATIVE NG/ML
BENZODIAZ UR QL: NEGATIVE NG/ML
BZE UR QL: NEGATIVE NG/ML
CANNABINOIDS UR QL SCN: NEGATIVE NG/ML
METHADONE UR QL SCN: NEGATIVE NG/ML
OPIATES UR QL: POSITIVE
OXYCODONE UR QL CFM: 1493 NG/ML
OXYCODONE+OXYMORPHONE SERPLBLD QL SCN: POSITIVE
OXYCODONE+OXYMORPHONE UR QL SCN: NORMAL NG/ML
OXYCODONE+OXYMORPHONE UR QL SCN: POSITIVE
OXYMORPHONE UR CFM-MCNC: 3228 NG/ML
OXYMORPHONE UR QL CFM: POSITIVE
PCP UR QL: NEGATIVE NG/ML
PROPOXYPH UR QL SCN: NEGATIVE NG/ML

## 2019-10-31 DIAGNOSIS — I10 ESSENTIAL HYPERTENSION: ICD-10-CM

## 2019-10-31 RX ORDER — AMLODIPINE BESYLATE 5 MG/1
TABLET ORAL
Qty: 30 TABLET | Refills: 0 | Status: SHIPPED | OUTPATIENT
Start: 2019-10-31 | End: 2019-12-03 | Stop reason: SDUPTHER

## 2019-11-10 DIAGNOSIS — R52 PAIN: ICD-10-CM

## 2019-11-11 RX ORDER — DULOXETIN HYDROCHLORIDE 60 MG/1
60 CAPSULE, DELAYED RELEASE ORAL DAILY
Qty: 30 CAPSULE | Refills: 0 | Status: SHIPPED | OUTPATIENT
Start: 2019-11-11 | End: 2019-12-17 | Stop reason: SDUPTHER

## 2019-11-11 RX ORDER — ACETAMINOPHEN AND CODEINE PHOSPHATE 60; 300 MG/1; MG/1
1 TABLET ORAL EVERY 6 HOURS PRN
Qty: 120 TABLET | Refills: 0 | Status: SHIPPED | OUTPATIENT
Start: 2019-11-11 | End: 2019-12-09 | Stop reason: SDUPTHER

## 2019-11-11 RX ORDER — ACETAMINOPHEN AND CODEINE PHOSPHATE 60; 300 MG/1; MG/1
TABLET ORAL
Qty: 120 TABLET | Refills: 0 | OUTPATIENT
Start: 2019-11-11

## 2019-11-17 DIAGNOSIS — R52 PAIN: ICD-10-CM

## 2019-11-18 DIAGNOSIS — R52 PAIN: ICD-10-CM

## 2019-11-18 RX ORDER — OXYCODONE HYDROCHLORIDE AND ACETAMINOPHEN 5; 325 MG/1; MG/1
1 TABLET ORAL EVERY 6 HOURS PRN
Qty: 100 TABLET | Refills: 0 | Status: SHIPPED | OUTPATIENT
Start: 2019-11-18 | End: 2020-01-21

## 2019-11-18 RX ORDER — OXYCODONE HYDROCHLORIDE AND ACETAMINOPHEN 5; 325 MG/1; MG/1
1 TABLET ORAL EVERY 6 HOURS PRN
Qty: 100 TABLET | Refills: 0 | Status: SHIPPED | OUTPATIENT
Start: 2019-11-18 | End: 2019-12-15 | Stop reason: SDUPTHER

## 2019-11-21 DIAGNOSIS — R52 PAIN: ICD-10-CM

## 2019-11-21 RX ORDER — CYCLOBENZAPRINE HCL 10 MG
TABLET ORAL
Qty: 30 TABLET | Refills: 0 | Status: SHIPPED | OUTPATIENT
Start: 2019-11-21 | End: 2019-12-17 | Stop reason: SDUPTHER

## 2019-12-03 DIAGNOSIS — R52 PAIN: ICD-10-CM

## 2019-12-03 DIAGNOSIS — I10 BENIGN ESSENTIAL HYPERTENSION: ICD-10-CM

## 2019-12-03 DIAGNOSIS — I10 ESSENTIAL HYPERTENSION: ICD-10-CM

## 2019-12-03 DIAGNOSIS — G47.00 INSOMNIA, UNSPECIFIED TYPE: ICD-10-CM

## 2019-12-03 RX ORDER — ZOLPIDEM TARTRATE 5 MG/1
TABLET ORAL
Qty: 30 TABLET | Refills: 5 | Status: SHIPPED | OUTPATIENT
Start: 2019-12-03 | End: 2020-05-04 | Stop reason: SDUPTHER

## 2019-12-03 RX ORDER — AMLODIPINE BESYLATE 5 MG/1
TABLET ORAL
Qty: 30 TABLET | Refills: 0 | Status: SHIPPED | OUTPATIENT
Start: 2019-12-03 | End: 2019-12-17 | Stop reason: SDUPTHER

## 2019-12-04 RX ORDER — ALBUTEROL SULFATE 2.5 MG/3ML
2.5 SOLUTION RESPIRATORY (INHALATION) EVERY 4 HOURS PRN
Qty: 100 VIAL | Refills: 0 | Status: SHIPPED | OUTPATIENT
Start: 2019-12-04 | End: 2019-12-17 | Stop reason: SDUPTHER

## 2019-12-08 DIAGNOSIS — R52 PAIN: ICD-10-CM

## 2019-12-09 DIAGNOSIS — R52 PAIN: ICD-10-CM

## 2019-12-09 RX ORDER — ACETAMINOPHEN AND CODEINE PHOSPHATE 60; 300 MG/1; MG/1
1 TABLET ORAL EVERY 6 HOURS PRN
Qty: 120 TABLET | Refills: 0 | Status: CANCELLED | OUTPATIENT
Start: 2019-12-09

## 2019-12-09 RX ORDER — ACETAMINOPHEN AND CODEINE PHOSPHATE 60; 300 MG/1; MG/1
1 TABLET ORAL EVERY 6 HOURS PRN
Qty: 120 TABLET | Refills: 5 | Status: SHIPPED | OUTPATIENT
Start: 2019-12-09 | End: 2020-04-06 | Stop reason: SDUPTHER

## 2019-12-09 RX ORDER — PANTOPRAZOLE SODIUM 40 MG/1
40 TABLET, DELAYED RELEASE ORAL DAILY
Qty: 30 TABLET | Refills: 0 | Status: SHIPPED | OUTPATIENT
Start: 2019-12-09 | End: 2020-01-21 | Stop reason: SDUPTHER

## 2019-12-15 DIAGNOSIS — R52 PAIN: ICD-10-CM

## 2019-12-16 RX ORDER — OXYCODONE HYDROCHLORIDE AND ACETAMINOPHEN 5; 325 MG/1; MG/1
1 TABLET ORAL EVERY 6 HOURS PRN
Qty: 100 TABLET | Refills: 0 | Status: SHIPPED | OUTPATIENT
Start: 2019-12-16 | End: 2020-01-13 | Stop reason: SDUPTHER

## 2019-12-17 DIAGNOSIS — E78.2 MIXED HYPERLIPIDEMIA: ICD-10-CM

## 2019-12-17 DIAGNOSIS — J44.9 CHRONIC OBSTRUCTIVE PULMONARY DISEASE, UNSPECIFIED COPD TYPE (HCC): ICD-10-CM

## 2019-12-17 DIAGNOSIS — R52 PAIN: ICD-10-CM

## 2019-12-17 DIAGNOSIS — I10 ESSENTIAL HYPERTENSION: ICD-10-CM

## 2019-12-17 DIAGNOSIS — I10 BENIGN ESSENTIAL HYPERTENSION: ICD-10-CM

## 2019-12-18 RX ORDER — AMLODIPINE BESYLATE 5 MG/1
5 TABLET ORAL DAILY
Qty: 30 TABLET | Refills: 0 | Status: SHIPPED | OUTPATIENT
Start: 2019-12-18 | End: 2020-01-02 | Stop reason: SDUPTHER

## 2019-12-18 RX ORDER — DULOXETIN HYDROCHLORIDE 60 MG/1
60 CAPSULE, DELAYED RELEASE ORAL DAILY
Qty: 30 CAPSULE | Refills: 0 | Status: SHIPPED | OUTPATIENT
Start: 2019-12-18 | End: 2020-01-21 | Stop reason: SDUPTHER

## 2019-12-18 RX ORDER — CYCLOBENZAPRINE HCL 10 MG
10 TABLET ORAL 3 TIMES DAILY PRN
Qty: 30 TABLET | Refills: 0 | Status: SHIPPED | OUTPATIENT
Start: 2019-12-18 | End: 2020-01-21 | Stop reason: SDUPTHER

## 2019-12-18 RX ORDER — ALBUTEROL SULFATE 2.5 MG/3ML
2.5 SOLUTION RESPIRATORY (INHALATION) EVERY 4 HOURS PRN
Qty: 100 VIAL | Refills: 0 | Status: SHIPPED | OUTPATIENT
Start: 2019-12-18 | End: 2020-02-13 | Stop reason: SDUPTHER

## 2019-12-18 RX ORDER — ATORVASTATIN CALCIUM 40 MG/1
40 TABLET, FILM COATED ORAL DAILY
Qty: 30 TABLET | Refills: 0 | Status: SHIPPED | OUTPATIENT
Start: 2019-12-18 | End: 2020-01-17 | Stop reason: SDUPTHER

## 2020-01-02 DIAGNOSIS — I10 ESSENTIAL HYPERTENSION: ICD-10-CM

## 2020-01-03 RX ORDER — AMLODIPINE BESYLATE 5 MG/1
5 TABLET ORAL DAILY
Qty: 30 TABLET | Refills: 0 | Status: SHIPPED | OUTPATIENT
Start: 2020-01-03 | End: 2020-01-21 | Stop reason: SDUPTHER

## 2020-01-10 DIAGNOSIS — R05.9 COUGH: Primary | ICD-10-CM

## 2020-01-10 RX ORDER — BENZONATATE 100 MG/1
100 CAPSULE ORAL 3 TIMES DAILY PRN
Qty: 20 CAPSULE | Refills: 0 | Status: SHIPPED | OUTPATIENT
Start: 2020-01-10 | End: 2020-01-21

## 2020-01-13 DIAGNOSIS — R52 PAIN: ICD-10-CM

## 2020-01-13 RX ORDER — OXYCODONE HYDROCHLORIDE AND ACETAMINOPHEN 5; 325 MG/1; MG/1
1 TABLET ORAL EVERY 6 HOURS PRN
Qty: 100 TABLET | Refills: 0 | Status: SHIPPED | OUTPATIENT
Start: 2020-01-13 | End: 2020-02-10 | Stop reason: SDUPTHER

## 2020-01-17 DIAGNOSIS — E78.2 MIXED HYPERLIPIDEMIA: ICD-10-CM

## 2020-01-17 DIAGNOSIS — J44.9 CHRONIC OBSTRUCTIVE PULMONARY DISEASE, UNSPECIFIED COPD TYPE (HCC): ICD-10-CM

## 2020-01-17 RX ORDER — ATORVASTATIN CALCIUM 40 MG/1
40 TABLET, FILM COATED ORAL DAILY
Qty: 30 TABLET | Refills: 0 | Status: SHIPPED | OUTPATIENT
Start: 2020-01-17 | End: 2020-01-21 | Stop reason: SDUPTHER

## 2020-01-21 ENCOUNTER — OFFICE VISIT (OUTPATIENT)
Dept: FAMILY MEDICINE CLINIC | Facility: CLINIC | Age: 65
End: 2020-01-21
Payer: MEDICARE

## 2020-01-21 VITALS
SYSTOLIC BLOOD PRESSURE: 124 MMHG | OXYGEN SATURATION: 96 % | HEART RATE: 70 BPM | WEIGHT: 167 LBS | HEIGHT: 72 IN | BODY MASS INDEX: 22.62 KG/M2 | DIASTOLIC BLOOD PRESSURE: 72 MMHG

## 2020-01-21 DIAGNOSIS — E78.2 MIXED HYPERLIPIDEMIA: ICD-10-CM

## 2020-01-21 DIAGNOSIS — K21.9 GASTROESOPHAGEAL REFLUX DISEASE, ESOPHAGITIS PRESENCE NOT SPECIFIED: ICD-10-CM

## 2020-01-21 DIAGNOSIS — I10 BENIGN ESSENTIAL HYPERTENSION: ICD-10-CM

## 2020-01-21 DIAGNOSIS — I10 ESSENTIAL HYPERTENSION: ICD-10-CM

## 2020-01-21 DIAGNOSIS — G89.4 CHRONIC PAIN SYNDROME: ICD-10-CM

## 2020-01-21 DIAGNOSIS — R93.89 ABNORMAL CT OF THE CHEST: ICD-10-CM

## 2020-01-21 DIAGNOSIS — G89.29 CHRONIC BILATERAL LOW BACK PAIN WITHOUT SCIATICA: ICD-10-CM

## 2020-01-21 DIAGNOSIS — J43.9 PULMONARY EMPHYSEMA, UNSPECIFIED EMPHYSEMA TYPE (HCC): Primary | ICD-10-CM

## 2020-01-21 DIAGNOSIS — M54.50 CHRONIC BILATERAL LOW BACK PAIN WITHOUT SCIATICA: ICD-10-CM

## 2020-01-21 DIAGNOSIS — F11.20 UNCOMPLICATED OPIOID DEPENDENCE (HCC): ICD-10-CM

## 2020-01-21 DIAGNOSIS — J44.9 CHRONIC OBSTRUCTIVE PULMONARY DISEASE, UNSPECIFIED COPD TYPE (HCC): ICD-10-CM

## 2020-01-21 DIAGNOSIS — R52 PAIN: ICD-10-CM

## 2020-01-21 PROCEDURE — 99214 OFFICE O/P EST MOD 30 MIN: CPT | Performed by: FAMILY MEDICINE

## 2020-01-21 RX ORDER — AMLODIPINE BESYLATE 5 MG/1
5 TABLET ORAL DAILY
Qty: 90 TABLET | Refills: 3 | Status: SHIPPED | OUTPATIENT
Start: 2020-01-21 | End: 2020-04-29

## 2020-01-21 RX ORDER — ATORVASTATIN CALCIUM 40 MG/1
40 TABLET, FILM COATED ORAL DAILY
Qty: 90 TABLET | Refills: 3 | Status: SHIPPED | OUTPATIENT
Start: 2020-01-21 | End: 2020-07-13

## 2020-01-21 RX ORDER — DULOXETIN HYDROCHLORIDE 60 MG/1
60 CAPSULE, DELAYED RELEASE ORAL DAILY
Qty: 90 CAPSULE | Refills: 3 | Status: SHIPPED | OUTPATIENT
Start: 2020-01-21 | End: 2021-02-11

## 2020-01-21 RX ORDER — PANTOPRAZOLE SODIUM 40 MG/1
40 TABLET, DELAYED RELEASE ORAL DAILY
Qty: 90 TABLET | Refills: 3 | Status: SHIPPED | OUTPATIENT
Start: 2020-01-21 | End: 2021-06-15 | Stop reason: SDUPTHER

## 2020-01-21 RX ORDER — CYCLOBENZAPRINE HCL 10 MG
10 TABLET ORAL 3 TIMES DAILY PRN
Qty: 30 TABLET | Refills: 5 | Status: SHIPPED | OUTPATIENT
Start: 2020-01-21 | End: 2020-08-12 | Stop reason: SDUPTHER

## 2020-01-21 RX ORDER — ALBUTEROL SULFATE 90 UG/1
2 AEROSOL, METERED RESPIRATORY (INHALATION) EVERY 4 HOURS PRN
Qty: 18 G | Refills: 5 | Status: SHIPPED | OUTPATIENT
Start: 2020-01-21 | End: 2020-08-18

## 2020-01-21 NOTE — PROGRESS NOTES
Assessment/Plan:    No problem-specific Assessment & Plan notes found for this encounter  Diagnoses and all orders for this visit:    Pulmonary emphysema, unspecified emphysema type ([facility] [address] )  -     CT chest wo contrast; Future    Benign essential hypertension  -     Comprehensive metabolic panel; Future  -     TSH, 3rd generation; Future  -     Lipid panel; Future  -     Microalbumin / creatinine urine ratio    Gastroesophageal reflux disease, esophagitis presence not specified    Chronic bilateral low back pain without sciatica    Uncomplicated opioid dependence (HCC)    Chronic pain syndrome  -     Toxicology screen, urine  -     OXYCODONE AND METABOLITE; Future    Chronic obstructive pulmonary disease, unspecified COPD type ([facility])  -     albuterol (VENTOLIN HFA) 90 mcg/act inhaler; Inhale 2 puffs every 4 (four) hours as needed for wheezing    Essential hypertension  -     amLODIPine (NORVASC) 5 mg tablet; Take 1 tablet (5 mg total) by mouth daily  -     Comprehensive metabolic panel; Future  -     TSH, 3rd generation; Future  -     Lipid panel; Future  -     Microalbumin / creatinine urine ratio    Mixed hyperlipidemia  -     atorvastatin (LIPITOR) 40 mg tablet; Take 1 tablet (40 mg total) by mouth daily  -     Comprehensive metabolic panel; Future  -     TSH, 3rd generation; Future  -     Lipid panel; Future  -     Microalbumin / creatinine urine ratio    Pain  -     cyclobenzaprine (FLEXERIL) 10 mg tablet; Take 1 tablet (10 mg total) by mouth 3 (three) times a day as needed for muscle spasms  -     DULoxetine (CYMBALTA) 60 mg delayed release capsule; Take 1 capsule (60 mg total) by mouth daily  -     pantoprazole (PROTONIX) 40 mg tablet; Take 1 tablet (40 mg total) by mouth daily    Abnormal CT of the chest  -     CT chest wo contrast; Future          Subjective:      Patient ID: Milad Archer is a 75 y o  male  He has COPD    His FEV1 is 62 percent of predicted which makes his disease GOLD stage 2 (moderate)  He is symptoms are well controlled with Trelegy  He has no side effects from his current regimen  He has no increased sputum production  He has no cough  He can walk flight of stairs without being short of breath  His blood pressure is well controlled on his current regimen  He has no chest pain or shortness of breath other than that listed above  He has no PND, orthopnea, and no dyspnea on exertion with normal activity  He has chronic pain  He has low back pain without sciatica  He has bilateral knee pain  He has tried a number of over-the-counter medications  He takes a combination of Tylenol 3 for mild-to-moderate pain and oxycodone for moderate to severe pain  He has assigned treatment agreement in the chart  His urine toxicology consistent with regular use of his prescribed medications  There is no concern for abuse, misuse, or diversion  He is wearing a knee brace and a back brace  He gets benefit from all of the above  He is also wearing a left wrist brace today  With his medication, he is able to perform tasks of daily living  Without his medication, he would have much less functionality  He would have decreased range of motion and would have difficult time caring for his household chores and shopping etc  He seems to benefit from this medication and he and I both discussed the risks and benefits of long-term use of these medications  He and I are both in agreement that the benefits outweigh the risks, at least in his case  I have queried the [place] prescription drug monitoring program to sure that we are the only provider writing for this medication  He has Narcan at home  In addition to the Percocet and Tylenol 3, he takes nonsteroidal anti-inflammatory medications, Flexeril, and duloxetine  He has hyperlipidemia  He is on Lipitor 40 mg daily  His liver function testing is within normal limits    He has no increased myalgia or muscle weakness  The following portions of the patient's history were reviewed and updated as appropriate:   He  has a past medical history of Hyperthyroidism  He   Patient Active Problem List    Diagnosis Date Noted    Uncomplicated opioid dependence ([facility] [address] ) 01/21/2020    Lumbar radiculopathy 02/26/2019    Screening for AAA (abdominal aortic aneurysm) 01/18/2019    Medicare annual wellness visit, initial 01/18/2019    Low back pain 12/19/2018    Displacement of lumbar intervertebral disc without myelopathy 12/19/2018    Lumbar spondylosis 12/14/2018    Bulging of lumbar intervertebral disc 12/14/2018    Screening for neurological condition 10/18/2018    Encounter for hepatitis C screening test for low risk patient 04/13/2018    Pain 04/13/2018    Benign essential hypertension 01/15/2018    Osteoarthritis of both knees 10/18/2017    Other instability, right knee 10/18/2017    Left knee pain 09/09/2016    Pain of left hip 09/09/2016    Neck pain 08/05/2016    Pain in joint of left shoulder 08/05/2016    Other chest pain 06/07/2016    Chronic low back pain 10/08/2015    Insomnia 09/29/2015    Early satiety 02/26/2015    GERD (gastroesophageal reflux disease) 10/31/2014    Headache 05/16/2014    Incisional hernia 10/11/2013    Weight loss, non-intentional 10/04/2013    Fatigue 06/26/2013    Anxiety 11/14/2012    Chronic obstructive pulmonary disease ([facility] [address] ) 11/14/2012     He  has a past surgical history that includes Inguinal hernia repair (Bilateral); Epidural block injection (N/A, 1/17/2019); Epidural block injection (Left, 3/6/2019); and FL guided needle plac bx/asp/inj (3/6/2019)  His family history includes Heart disease in his father, maternal grandfather, maternal grandmother, mother, paternal grandfather, and paternal grandmother  He  reports that he has been smoking cigarettes  He has been smoking about 1 00 pack per day   He has never used smokeless tobacco  He reports that he does not drink alcohol or use drugs  Current Outpatient Medications   Medication Sig Dispense Refill    acetaminophen-codeine (TYLENOL #4) 300-60 MG per tablet Take 1 tablet by mouth every 6 (six) hours as needed for moderate pain 120 tablet 5    albuterol (2 5 mg/3 mL) 0 083 % nebulizer solution Take 1 vial (2 5 mg total) by nebulization every 4 (four) hours as needed for wheezing or shortness of breath 100 vial 0    albuterol (VENTOLIN HFA) 90 mcg/act inhaler Inhale 2 puffs every 4 (four) hours as needed for wheezing 18 g 5    ALPRAZolam (XANAX) 0 25 mg tablet Take 1 tablet (0 25 mg total) by mouth 3 (three) times a day as needed for anxiety 90 tablet 5    amLODIPine (NORVASC) 5 mg tablet Take 1 tablet (5 mg total) by mouth daily 90 tablet 3    atorvastatin (LIPITOR) 40 mg tablet Take 1 tablet (40 mg total) by mouth daily 90 tablet 3    cyclobenzaprine (FLEXERIL) 10 mg tablet Take 1 tablet (10 mg total) by mouth 3 (three) times a day as needed for muscle spasms 30 tablet 5    diclofenac sodium (VOLTAREN) 1 % APPLY 4 G TOPICALLY 4 TIMES A  g 0    DULoxetine (CYMBALTA) 60 mg delayed release capsule Take 1 capsule (60 mg total) by mouth daily 90 capsule 3    fluticasone-umeclidinium-vilanterol (TRELEGY) 100-62 5-25 MCG/INH inhaler Inhale 1 puff daily Rinse mouth after use  1 Inhaler 0    oxyCODONE-acetaminophen (PERCOCET) 5-325 mg per tablet Take 1 tablet by mouth every 6 (six) hours as needed for moderate pain or severe painMax Daily Amount: 4 tablets 100 tablet 0    pantoprazole (PROTONIX) 40 mg tablet Take 1 tablet (40 mg total) by mouth daily 90 tablet 3    zolpidem (AMBIEN) 5 mg tablet take 1 tablet by mouth at bedtime if needed for sleep 30 tablet 5     No current facility-administered medications for this visit        Current Outpatient Medications on File Prior to Visit   Medication Sig    acetaminophen-codeine (TYLENOL #4) 300-60 MG per tablet Take 1 tablet by mouth every 6 (six) hours as needed for moderate pain    albuterol (2 5 mg/3 mL) 0 083 % nebulizer solution Take 1 vial (2 5 mg total) by nebulization every 4 (four) hours as needed for wheezing or shortness of breath    ALPRAZolam (XANAX) 0 25 mg tablet Take 1 tablet (0 25 mg total) by mouth 3 (three) times a day as needed for anxiety    diclofenac sodium (VOLTAREN) 1 % APPLY 4 G TOPICALLY 4 TIMES A DAY    fluticasone-umeclidinium-vilanterol (TRELEGY) 100-62 5-25 MCG/INH inhaler Inhale 1 puff daily Rinse mouth after use      oxyCODONE-acetaminophen (PERCOCET) 5-325 mg per tablet Take 1 tablet by mouth every 6 (six) hours as needed for moderate pain or severe painMax Daily Amount: 4 tablets    zolpidem (AMBIEN) 5 mg tablet take 1 tablet by mouth at bedtime if needed for sleep    [DISCONTINUED] albuterol (VENTOLIN HFA) 90 mcg/act inhaler Inhale 2 puffs every 4 (four) hours as needed for wheezing    [DISCONTINUED] amLODIPine (NORVASC) 5 mg tablet Take 1 tablet (5 mg total) by mouth daily    [DISCONTINUED] atorvastatin (LIPITOR) 40 mg tablet Take 1 tablet (40 mg total) by mouth daily    [DISCONTINUED] cyclobenzaprine (FLEXERIL) 10 mg tablet Take 1 tablet (10 mg total) by mouth 3 (three) times a day as needed for muscle spasms    [DISCONTINUED] DULoxetine (CYMBALTA) 60 mg delayed release capsule Take 1 capsule (60 mg total) by mouth daily    [DISCONTINUED] pantoprazole (PROTONIX) 40 mg tablet Take 1 tablet (40 mg total) by mouth daily    [DISCONTINUED] benzonatate (TESSALON PERLES) 100 mg capsule Take 1 capsule (100 mg total) by mouth 3 (three) times a day as needed for cough (Patient not taking: Reported on 1/21/2020)    [DISCONTINUED] Influenza Vac Split Quad (FLUARIX QUADRIVALENT IM) inject 0 5 milliliters intramuscularly    [DISCONTINUED] oxyCODONE-acetaminophen (PERCOCET) 5-325 mg per tablet Take 1 tablet by mouth every 6 (six) hours as needed for moderate pain or severe painMax Daily Amount: 4 tablets    [DISCONTINUED] zolpidem (AMBIEN) 10 mg tablet Take 1 tablet (10 mg total) by mouth daily at bedtime as needed for sleep    [DISCONTINUED] zolpidem (AMBIEN) 5 mg tablet      No current facility-administered medications on file prior to visit  He has No Known Allergies       Review of Systems   All other systems reviewed and are negative  Objective:      /72   Pulse 70   Ht 6' (1 829 m)   Wt 75 8 kg (167 lb)   SpO2 96%   BMI 22 65 kg/m²          Physical Exam   Constitutional: He is oriented to person, place, and time  He appears well-developed and well-nourished  Neck: Normal range of motion  Neck supple  Cardiovascular: Normal rate, regular rhythm, normal heart sounds and intact distal pulses  Pulmonary/Chest: Effort normal and breath sounds normal    Abdominal: Soft  Bowel sounds are normal    Musculoskeletal: Normal range of motion  Neurological: He is alert and oriented to person, place, and time  Skin: Skin is warm and dry  Capillary refill takes less than 2 seconds  Psychiatric: He has a normal mood and affect  His behavior is normal  Judgment and thought content normal    Nursing note and vitals reviewed

## 2020-02-09 DIAGNOSIS — R52 PAIN: ICD-10-CM

## 2020-02-10 DIAGNOSIS — R52 PAIN: ICD-10-CM

## 2020-02-10 RX ORDER — OXYCODONE HYDROCHLORIDE AND ACETAMINOPHEN 5; 325 MG/1; MG/1
1 TABLET ORAL EVERY 6 HOURS PRN
Qty: 100 TABLET | Refills: 0 | OUTPATIENT
Start: 2020-02-10

## 2020-02-10 RX ORDER — OXYCODONE HYDROCHLORIDE AND ACETAMINOPHEN 5; 325 MG/1; MG/1
1 TABLET ORAL EVERY 6 HOURS PRN
Qty: 100 TABLET | Refills: 0 | Status: SHIPPED | OUTPATIENT
Start: 2020-02-10 | End: 2020-03-09 | Stop reason: SDUPTHER

## 2020-02-13 DIAGNOSIS — J44.9 CHRONIC OBSTRUCTIVE PULMONARY DISEASE, UNSPECIFIED COPD TYPE (HCC): ICD-10-CM

## 2020-02-13 DIAGNOSIS — R52 PAIN: ICD-10-CM

## 2020-02-13 DIAGNOSIS — I10 BENIGN ESSENTIAL HYPERTENSION: ICD-10-CM

## 2020-02-13 RX ORDER — ALBUTEROL SULFATE 2.5 MG/3ML
2.5 SOLUTION RESPIRATORY (INHALATION) EVERY 4 HOURS PRN
Qty: 100 VIAL | Refills: 5 | Status: SHIPPED | OUTPATIENT
Start: 2020-02-13 | End: 2020-10-12

## 2020-02-17 DIAGNOSIS — J32.9 SINUSITIS, UNSPECIFIED CHRONICITY, UNSPECIFIED LOCATION: Primary | ICD-10-CM

## 2020-02-17 RX ORDER — AMOXICILLIN 500 MG/1
500 CAPSULE ORAL EVERY 8 HOURS SCHEDULED
Qty: 21 CAPSULE | Refills: 0 | Status: SHIPPED | OUTPATIENT
Start: 2020-02-17 | End: 2020-02-21 | Stop reason: SDUPTHER

## 2020-02-21 DIAGNOSIS — J32.9 SINUSITIS, UNSPECIFIED CHRONICITY, UNSPECIFIED LOCATION: ICD-10-CM

## 2020-02-21 RX ORDER — AMOXICILLIN 500 MG/1
500 CAPSULE ORAL EVERY 8 HOURS SCHEDULED
Qty: 21 CAPSULE | Refills: 0 | Status: SHIPPED | OUTPATIENT
Start: 2020-02-21 | End: 2020-02-28

## 2020-03-09 DIAGNOSIS — R52 PAIN: ICD-10-CM

## 2020-03-09 RX ORDER — OXYCODONE HYDROCHLORIDE AND ACETAMINOPHEN 5; 325 MG/1; MG/1
1 TABLET ORAL EVERY 6 HOURS PRN
Qty: 100 TABLET | Refills: 0 | Status: SHIPPED | OUTPATIENT
Start: 2020-03-09 | End: 2020-04-06 | Stop reason: SDUPTHER

## 2020-03-09 RX ORDER — OXYCODONE HYDROCHLORIDE AND ACETAMINOPHEN 5; 325 MG/1; MG/1
1 TABLET ORAL EVERY 6 HOURS PRN
Qty: 100 TABLET | Refills: 0 | OUTPATIENT
Start: 2020-03-09

## 2020-03-19 DIAGNOSIS — J32.9 SINUSITIS, UNSPECIFIED CHRONICITY, UNSPECIFIED LOCATION: Primary | ICD-10-CM

## 2020-03-19 RX ORDER — METHYLPREDNISOLONE 4 MG/1
TABLET ORAL
Qty: 21 EACH | Refills: 0 | Status: SHIPPED | OUTPATIENT
Start: 2020-03-19 | End: 2020-07-02 | Stop reason: ALTCHOICE

## 2020-03-19 RX ORDER — DOXYCYCLINE 100 MG/1
100 CAPSULE ORAL 2 TIMES DAILY
Qty: 14 CAPSULE | Refills: 0 | Status: SHIPPED | OUTPATIENT
Start: 2020-03-19 | End: 2020-03-26

## 2020-03-25 ENCOUNTER — TRANSITIONAL CARE MANAGEMENT (OUTPATIENT)
Dept: FAMILY MEDICINE CLINIC | Facility: CLINIC | Age: 65
End: 2020-03-25

## 2020-03-31 DIAGNOSIS — F41.9 ANXIETY: ICD-10-CM

## 2020-03-31 RX ORDER — ALPRAZOLAM 0.25 MG/1
0.25 TABLET ORAL 3 TIMES DAILY PRN
Qty: 90 TABLET | Refills: 0 | OUTPATIENT
Start: 2020-03-31

## 2020-03-31 RX ORDER — ALPRAZOLAM 0.25 MG/1
TABLET ORAL
Qty: 90 TABLET | Refills: 5 | Status: SHIPPED | OUTPATIENT
Start: 2020-03-31 | End: 2020-09-15

## 2020-04-06 DIAGNOSIS — R52 PAIN: ICD-10-CM

## 2020-04-06 RX ORDER — ACETAMINOPHEN AND CODEINE PHOSPHATE 60; 300 MG/1; MG/1
1 TABLET ORAL EVERY 6 HOURS PRN
Qty: 120 TABLET | Refills: 0 | Status: SHIPPED | OUTPATIENT
Start: 2020-04-06 | End: 2020-06-23

## 2020-04-06 RX ORDER — OXYCODONE HYDROCHLORIDE AND ACETAMINOPHEN 5; 325 MG/1; MG/1
1 TABLET ORAL EVERY 6 HOURS PRN
Qty: 100 TABLET | Refills: 0 | Status: SHIPPED | OUTPATIENT
Start: 2020-04-06 | End: 2020-05-04 | Stop reason: SDUPTHER

## 2020-04-13 ENCOUNTER — TELEMEDICINE (OUTPATIENT)
Dept: FAMILY MEDICINE CLINIC | Facility: CLINIC | Age: 65
End: 2020-04-13
Payer: MEDICARE

## 2020-04-13 DIAGNOSIS — I21.4 NSTEMI (NON-ST ELEVATED MYOCARDIAL INFARCTION) (HCC): Primary | ICD-10-CM

## 2020-04-13 DIAGNOSIS — J43.9 PULMONARY EMPHYSEMA, UNSPECIFIED EMPHYSEMA TYPE (HCC): Primary | ICD-10-CM

## 2020-04-13 PROCEDURE — 4004F PT TOBACCO SCREEN RCVD TLK: CPT | Performed by: STUDENT IN AN ORGANIZED HEALTH CARE EDUCATION/TRAINING PROGRAM

## 2020-04-13 PROCEDURE — 99213 OFFICE O/P EST LOW 20 MIN: CPT | Performed by: STUDENT IN AN ORGANIZED HEALTH CARE EDUCATION/TRAINING PROGRAM

## 2020-04-13 PROCEDURE — 3074F SYST BP LT 130 MM HG: CPT | Performed by: STUDENT IN AN ORGANIZED HEALTH CARE EDUCATION/TRAINING PROGRAM

## 2020-04-13 PROCEDURE — 4040F PNEUMOC VAC/ADMIN/RCVD: CPT | Performed by: STUDENT IN AN ORGANIZED HEALTH CARE EDUCATION/TRAINING PROGRAM

## 2020-04-13 PROCEDURE — 3078F DIAST BP <80 MM HG: CPT | Performed by: STUDENT IN AN ORGANIZED HEALTH CARE EDUCATION/TRAINING PROGRAM

## 2020-04-29 DIAGNOSIS — J44.9 CHRONIC OBSTRUCTIVE PULMONARY DISEASE, UNSPECIFIED COPD TYPE (HCC): Primary | ICD-10-CM

## 2020-05-04 DIAGNOSIS — G47.00 INSOMNIA, UNSPECIFIED TYPE: ICD-10-CM

## 2020-05-04 DIAGNOSIS — R52 PAIN: ICD-10-CM

## 2020-05-04 RX ORDER — OXYCODONE HYDROCHLORIDE AND ACETAMINOPHEN 5; 325 MG/1; MG/1
1 TABLET ORAL EVERY 6 HOURS PRN
Qty: 100 TABLET | Refills: 0 | Status: SHIPPED | OUTPATIENT
Start: 2020-05-04 | End: 2020-06-03 | Stop reason: SDUPTHER

## 2020-05-04 RX ORDER — ZOLPIDEM TARTRATE 5 MG/1
5 TABLET ORAL
Qty: 30 TABLET | Refills: 0 | Status: SHIPPED | OUTPATIENT
Start: 2020-05-04 | End: 2020-06-17 | Stop reason: SDUPTHER

## 2020-05-22 ENCOUNTER — CONSULT (OUTPATIENT)
Dept: CARDIOLOGY CLINIC | Facility: CLINIC | Age: 65
End: 2020-05-22
Payer: MEDICARE

## 2020-05-22 VITALS
DIASTOLIC BLOOD PRESSURE: 70 MMHG | HEART RATE: 70 BPM | SYSTOLIC BLOOD PRESSURE: 138 MMHG | WEIGHT: 167 LBS | HEIGHT: 72 IN | BODY MASS INDEX: 22.62 KG/M2

## 2020-05-22 DIAGNOSIS — I21.4 NSTEMI (NON-ST ELEVATED MYOCARDIAL INFARCTION) (HCC): ICD-10-CM

## 2020-05-22 PROCEDURE — 99204 OFFICE O/P NEW MOD 45 MIN: CPT | Performed by: INTERNAL MEDICINE

## 2020-05-22 RX ORDER — CLOPIDOGREL BISULFATE 75 MG/1
75 TABLET ORAL DAILY
COMMUNITY
End: 2020-06-17 | Stop reason: SDUPTHER

## 2020-05-22 RX ORDER — METOPROLOL SUCCINATE 25 MG/1
25 TABLET, EXTENDED RELEASE ORAL DAILY
COMMUNITY
End: 2020-06-17 | Stop reason: SDUPTHER

## 2020-06-02 DIAGNOSIS — R52 PAIN: ICD-10-CM

## 2020-06-02 RX ORDER — OXYCODONE HYDROCHLORIDE AND ACETAMINOPHEN 5; 325 MG/1; MG/1
1 TABLET ORAL EVERY 6 HOURS PRN
Qty: 100 TABLET | Refills: 0 | Status: CANCELLED | OUTPATIENT
Start: 2020-06-02

## 2020-06-02 RX ORDER — OXYCODONE HYDROCHLORIDE AND ACETAMINOPHEN 5; 325 MG/1; MG/1
1 TABLET ORAL EVERY 6 HOURS PRN
Qty: 100 TABLET | Refills: 0 | OUTPATIENT
Start: 2020-06-02

## 2020-06-03 DIAGNOSIS — R52 PAIN: ICD-10-CM

## 2020-06-03 RX ORDER — OXYCODONE HYDROCHLORIDE AND ACETAMINOPHEN 5; 325 MG/1; MG/1
1 TABLET ORAL EVERY 6 HOURS PRN
Qty: 100 TABLET | Refills: 0 | Status: SHIPPED | OUTPATIENT
Start: 2020-06-03 | End: 2020-07-01 | Stop reason: SDUPTHER

## 2020-06-08 ENCOUNTER — HOSPITAL ENCOUNTER (OUTPATIENT)
Dept: NON INVASIVE DIAGNOSTICS | Facility: HOSPITAL | Age: 65
Discharge: HOME | End: 2020-06-08
Attending: INTERNAL MEDICINE
Payer: MEDICARE

## 2020-06-08 DIAGNOSIS — I21.4 NSTEMI (NON-ST ELEVATED MYOCARDIAL INFARCTION) (HCC): ICD-10-CM

## 2020-06-08 LAB
CHEST PAIN STATEMENT: NORMAL
MAX DIASTOLIC BP: 80 MMHG
MAX HEART RATE: 115 BPM
MAX PREDICTED HEART RATE: 154 BPM
MAX. SYSTOLIC BP: 164 MMHG
PROTOCOL NAME: NORMAL
TARGET HR FORMULA: NORMAL
TEST INDICATION: NORMAL
TIME IN EXERCISE PHASE: NORMAL

## 2020-06-08 PROCEDURE — 93351 STRESS TTE COMPLETE: CPT | Performed by: INTERNAL MEDICINE

## 2020-06-08 PROCEDURE — 93350 STRESS TTE ONLY: CPT

## 2020-06-10 ENCOUNTER — TELEPHONE (OUTPATIENT)
Dept: CARDIOLOGY CLINIC | Facility: CLINIC | Age: 65
End: 2020-06-10

## 2020-06-11 ENCOUNTER — TELEPHONE (OUTPATIENT)
Dept: CARDIOLOGY CLINIC | Facility: CLINIC | Age: 65
End: 2020-06-11

## 2020-06-11 DIAGNOSIS — I10 BENIGN ESSENTIAL HYPERTENSION: ICD-10-CM

## 2020-06-11 DIAGNOSIS — R53.83 OTHER FATIGUE: Primary | ICD-10-CM

## 2020-06-11 DIAGNOSIS — I21.4 NSTEMI (NON-ST ELEVATED MYOCARDIAL INFARCTION) (HCC): ICD-10-CM

## 2020-06-11 DIAGNOSIS — R94.39 ABNORMAL STRESS TEST: ICD-10-CM

## 2020-06-17 DIAGNOSIS — G47.00 INSOMNIA, UNSPECIFIED TYPE: ICD-10-CM

## 2020-06-17 RX ORDER — METOPROLOL SUCCINATE 25 MG/1
25 TABLET, EXTENDED RELEASE ORAL DAILY
Qty: 90 TABLET | Refills: 3 | Status: SHIPPED | OUTPATIENT
Start: 2020-06-17 | End: 2021-06-01 | Stop reason: SDUPTHER

## 2020-06-17 RX ORDER — ASPIRIN 81 MG/1
81 TABLET ORAL DAILY
Qty: 90 TABLET | Refills: 3 | Status: SHIPPED | OUTPATIENT
Start: 2020-06-17 | End: 2021-06-01 | Stop reason: SDUPTHER

## 2020-06-17 RX ORDER — ZOLPIDEM TARTRATE 5 MG/1
5 TABLET ORAL
Qty: 30 TABLET | Refills: 0 | Status: SHIPPED | OUTPATIENT
Start: 2020-06-17 | End: 2020-07-21

## 2020-06-17 RX ORDER — CLOPIDOGREL BISULFATE 75 MG/1
75 TABLET ORAL DAILY
Qty: 90 TABLET | Refills: 3 | Status: SHIPPED | OUTPATIENT
Start: 2020-06-17 | End: 2021-06-01 | Stop reason: SDUPTHER

## 2020-06-22 DIAGNOSIS — R52 PAIN: ICD-10-CM

## 2020-06-23 RX ORDER — ACETAMINOPHEN AND CODEINE PHOSPHATE 60; 300 MG/1; MG/1
TABLET ORAL
Qty: 120 TABLET | Refills: 5 | Status: SHIPPED | OUTPATIENT
Start: 2020-06-23 | End: 2020-12-07 | Stop reason: SDUPTHER

## 2020-06-29 ENCOUNTER — APPOINTMENT (OUTPATIENT)
Dept: LAB | Facility: HOSPITAL | Age: 65
End: 2020-06-29
Attending: INTERNAL MEDICINE
Payer: MEDICARE

## 2020-06-29 LAB
ANION GAP SERPL CALCULATED.3IONS-SCNC: 7 MMOL/L (ref 4–13)
BASOPHILS # BLD AUTO: 0.07 THOUSANDS/ΜL (ref 0–0.1)
BASOPHILS NFR BLD AUTO: 1 % (ref 0–1)
BUN SERPL-MCNC: 12 MG/DL (ref 5–25)
CALCIUM SERPL-MCNC: 8.5 MG/DL (ref 8.3–10.1)
CHLORIDE SERPL-SCNC: 101 MMOL/L (ref 100–108)
CO2 SERPL-SCNC: 27 MMOL/L (ref 21–32)
CREAT SERPL-MCNC: 0.93 MG/DL (ref 0.6–1.3)
EOSINOPHIL # BLD AUTO: 0.3 THOUSAND/ΜL (ref 0–0.61)
EOSINOPHIL NFR BLD AUTO: 3 % (ref 0–6)
ERYTHROCYTE [DISTWIDTH] IN BLOOD BY AUTOMATED COUNT: 12.7 % (ref 11.6–15.1)
GFR SERPL CREATININE-BSD FRML MDRD: 85 ML/MIN/1.73SQ M
GLUCOSE P FAST SERPL-MCNC: 114 MG/DL (ref 65–99)
HCT VFR BLD AUTO: 39 % (ref 36.5–49.3)
HGB BLD-MCNC: 13.1 G/DL (ref 12–17)
IMM GRANULOCYTES # BLD AUTO: 0.03 THOUSAND/UL (ref 0–0.2)
IMM GRANULOCYTES NFR BLD AUTO: 0 % (ref 0–2)
LYMPHOCYTES # BLD AUTO: 1.55 THOUSANDS/ΜL (ref 0.6–4.47)
LYMPHOCYTES NFR BLD AUTO: 17 % (ref 14–44)
MCH RBC QN AUTO: 29.8 PG (ref 26.8–34.3)
MCHC RBC AUTO-ENTMCNC: 33.6 G/DL (ref 31.4–37.4)
MCV RBC AUTO: 89 FL (ref 82–98)
MONOCYTES # BLD AUTO: 0.67 THOUSAND/ΜL (ref 0.17–1.22)
MONOCYTES NFR BLD AUTO: 8 % (ref 4–12)
NEUTROPHILS # BLD AUTO: 6.34 THOUSANDS/ΜL (ref 1.85–7.62)
NEUTS SEG NFR BLD AUTO: 71 % (ref 43–75)
NRBC BLD AUTO-RTO: 0 /100 WBCS
PLATELET # BLD AUTO: 326 THOUSANDS/UL (ref 149–390)
PMV BLD AUTO: 8.7 FL (ref 8.9–12.7)
POTASSIUM SERPL-SCNC: 4.5 MMOL/L (ref 3.5–5.3)
RBC # BLD AUTO: 4.39 MILLION/UL (ref 3.88–5.62)
SODIUM SERPL-SCNC: 135 MMOL/L (ref 136–145)
WBC # BLD AUTO: 8.96 THOUSAND/UL (ref 4.31–10.16)

## 2020-06-29 PROCEDURE — 80048 BASIC METABOLIC PNL TOTAL CA: CPT

## 2020-06-29 PROCEDURE — 85025 COMPLETE CBC W/AUTO DIFF WBC: CPT

## 2020-06-29 PROCEDURE — 36415 COLL VENOUS BLD VENIPUNCTURE: CPT

## 2020-06-30 RX ORDER — ASPIRIN 325 MG
325 TABLET ORAL ONCE
Status: CANCELLED | OUTPATIENT
Start: 2020-07-02

## 2020-06-30 RX ORDER — SODIUM CHLORIDE 9 MG/ML
100 INJECTION, SOLUTION INTRAVENOUS ONCE
Status: CANCELLED | OUTPATIENT
Start: 2020-07-02

## 2020-07-01 ENCOUNTER — TELEPHONE (OUTPATIENT)
Dept: SURGERY | Facility: HOSPITAL | Age: 65
End: 2020-07-01

## 2020-07-01 DIAGNOSIS — R52 PAIN: ICD-10-CM

## 2020-07-01 RX ORDER — OXYCODONE HYDROCHLORIDE AND ACETAMINOPHEN 5; 325 MG/1; MG/1
1 TABLET ORAL EVERY 6 HOURS PRN
Qty: 100 TABLET | Refills: 0 | Status: SHIPPED | OUTPATIENT
Start: 2020-07-01 | End: 2020-07-28 | Stop reason: SDUPTHER

## 2020-07-02 ENCOUNTER — HOSPITAL ENCOUNTER (OUTPATIENT)
Dept: NON INVASIVE DIAGNOSTICS | Facility: HOSPITAL | Age: 65
Discharge: HOME | End: 2020-07-02
Attending: INTERNAL MEDICINE
Payer: COMMERCIAL

## 2020-07-02 VITALS
HEIGHT: 72 IN | RESPIRATION RATE: 18 BRPM | WEIGHT: 167 LBS | OXYGEN SATURATION: 95 % | DIASTOLIC BLOOD PRESSURE: 72 MMHG | HEART RATE: 70 BPM | TEMPERATURE: 98.8 F | SYSTOLIC BLOOD PRESSURE: 155 MMHG | BODY MASS INDEX: 22.62 KG/M2

## 2020-07-02 DIAGNOSIS — R94.39 ABNORMAL STRESS TEST: ICD-10-CM

## 2020-07-02 DIAGNOSIS — I21.4 NSTEMI (NON-ST ELEVATED MYOCARDIAL INFARCTION) (HCC): ICD-10-CM

## 2020-07-02 LAB
ATRIAL RATE: 65 BPM
P AXIS: 80 DEGREES
PR INTERVAL: 178 MS
QRS AXIS: 28 DEGREES
QRSD INTERVAL: 88 MS
QT INTERVAL: 416 MS
QTC INTERVAL: 432 MS
T WAVE AXIS: 51 DEGREES
VENTRICULAR RATE: 65 BPM

## 2020-07-02 PROCEDURE — 99153 MOD SED SAME PHYS/QHP EA: CPT | Performed by: INTERNAL MEDICINE

## 2020-07-02 PROCEDURE — 93005 ELECTROCARDIOGRAM TRACING: CPT

## 2020-07-02 PROCEDURE — C1769 GUIDE WIRE: HCPCS | Performed by: INTERNAL MEDICINE

## 2020-07-02 PROCEDURE — 93567 NJX CAR CTH SPRVLV AORTGRPHY: CPT | Performed by: INTERNAL MEDICINE

## 2020-07-02 PROCEDURE — 93458 L HRT ARTERY/VENTRICLE ANGIO: CPT | Performed by: INTERNAL MEDICINE

## 2020-07-02 PROCEDURE — 93010 ELECTROCARDIOGRAM REPORT: CPT | Performed by: INTERNAL MEDICINE

## 2020-07-02 PROCEDURE — 99152 MOD SED SAME PHYS/QHP 5/>YRS: CPT | Performed by: INTERNAL MEDICINE

## 2020-07-02 PROCEDURE — C1894 INTRO/SHEATH, NON-LASER: HCPCS | Performed by: INTERNAL MEDICINE

## 2020-07-02 RX ORDER — HEPARIN SODIUM 1000 [USP'U]/ML
INJECTION, SOLUTION INTRAVENOUS; SUBCUTANEOUS CODE/TRAUMA/SEDATION MEDICATION
Status: COMPLETED | OUTPATIENT
Start: 2020-07-02 | End: 2020-07-02

## 2020-07-02 RX ORDER — MIDAZOLAM HYDROCHLORIDE 2 MG/2ML
INJECTION, SOLUTION INTRAMUSCULAR; INTRAVENOUS CODE/TRAUMA/SEDATION MEDICATION
Status: COMPLETED | OUTPATIENT
Start: 2020-07-02 | End: 2020-07-02

## 2020-07-02 RX ORDER — VERAPAMIL HYDROCHLORIDE 2.5 MG/ML
INJECTION, SOLUTION INTRAVENOUS CODE/TRAUMA/SEDATION MEDICATION
Status: COMPLETED | OUTPATIENT
Start: 2020-07-02 | End: 2020-07-02

## 2020-07-02 RX ORDER — SODIUM CHLORIDE 9 MG/ML
100 INJECTION, SOLUTION INTRAVENOUS CONTINUOUS
Status: DISPENSED | OUTPATIENT
Start: 2020-07-02 | End: 2020-07-02

## 2020-07-02 RX ORDER — NITROGLYCERIN 20 MG/100ML
INJECTION INTRAVENOUS CODE/TRAUMA/SEDATION MEDICATION
Status: COMPLETED | OUTPATIENT
Start: 2020-07-02 | End: 2020-07-02

## 2020-07-02 RX ORDER — OXYCODONE HYDROCHLORIDE AND ACETAMINOPHEN 5; 325 MG/1; MG/1
1 TABLET ORAL ONCE
Status: COMPLETED | OUTPATIENT
Start: 2020-07-02 | End: 2020-07-02

## 2020-07-02 RX ORDER — FENTANYL CITRATE 50 UG/ML
INJECTION, SOLUTION INTRAMUSCULAR; INTRAVENOUS CODE/TRAUMA/SEDATION MEDICATION
Status: COMPLETED | OUTPATIENT
Start: 2020-07-02 | End: 2020-07-02

## 2020-07-02 RX ORDER — ASPIRIN 325 MG
325 TABLET ORAL ONCE
Status: DISCONTINUED | OUTPATIENT
Start: 2020-07-02 | End: 2020-07-03 | Stop reason: HOSPADM

## 2020-07-02 RX ORDER — LIDOCAINE HYDROCHLORIDE 20 MG/ML
INJECTION, SOLUTION INFILTRATION; PERINEURAL CODE/TRAUMA/SEDATION MEDICATION
Status: COMPLETED | OUTPATIENT
Start: 2020-07-02 | End: 2020-07-02

## 2020-07-02 RX ORDER — SODIUM CHLORIDE 9 MG/ML
100 INJECTION, SOLUTION INTRAVENOUS ONCE
Status: COMPLETED | OUTPATIENT
Start: 2020-07-02 | End: 2020-07-02

## 2020-07-02 RX ADMIN — SODIUM CHLORIDE 100 ML/HR: 0.9 INJECTION, SOLUTION INTRAVENOUS at 08:42

## 2020-07-02 RX ADMIN — VERAPAMIL HYDROCHLORIDE 2.5 MG: 2.5 INJECTION, SOLUTION INTRAVENOUS at 09:51

## 2020-07-02 RX ADMIN — HEPARIN SODIUM 4000 UNITS: 1000 INJECTION INTRAVENOUS; SUBCUTANEOUS at 09:50

## 2020-07-02 RX ADMIN — IOHEXOL 90 ML: 350 INJECTION, SOLUTION INTRAVENOUS at 10:05

## 2020-07-02 RX ADMIN — MIDAZOLAM 2 MG: 1 INJECTION INTRAMUSCULAR; INTRAVENOUS at 09:38

## 2020-07-02 RX ADMIN — FENTANYL CITRATE 50 MCG: 50 INJECTION, SOLUTION INTRAMUSCULAR; INTRAVENOUS at 09:38

## 2020-07-02 RX ADMIN — OXYCODONE HYDROCHLORIDE AND ACETAMINOPHEN 1 TABLET: 5; 325 TABLET ORAL at 13:03

## 2020-07-02 RX ADMIN — NITROGLYCERIN 200 MCG: 20 INJECTION INTRAVENOUS at 09:50

## 2020-07-02 RX ADMIN — LIDOCAINE HYDROCHLORIDE 1 ML: 20 INJECTION, SOLUTION INFILTRATION; PERINEURAL at 09:46

## 2020-07-02 NOTE — BRIEF OP NOTE (RAD/CATH)
Right radial catheterization    Findings:    Normal epicardial vessels, Left dominant    LVEDP: normal    LVEF: 60%    Asc Ao, top normal 36-37mm

## 2020-07-02 NOTE — PERIOPERATIVE NURSING NOTE
Attempted to call cath lab about ASA med order - did not give as ordered d/t pt taking ASA 81mg at home & unable to pull correct dosing from [facility] with current order,   no answer

## 2020-07-02 NOTE — H&P
HISTORY AND PHYSICAL - Cardiology   Neha Hanna 75 y o  male MRN: [id number]  Unit/Bed#:  Encounter: [id number]      Assessment:  1  Abnormal stress test, referred for elective coronary angiography by general cardiology       Plan:  Cardiac catheterization lab with plans for coronary angiography      History of Present Illness     HPI: Neha Hanna is a 75y o  year old male who presents with shortness rest chest pain, at abnormal stress test and referred by general cardiology for elective coronary angiography today      Review of Systems:  Review of Systems    14 systems reviewed and negative with the exception of the above and the following      Current Facility-Administered Medications:  aspirin 325 mg Oral Once Manny Dang PA-C              Historical Information   Past Medical History:   Diagnosis Date    Chronic pain disorder     back    Hyperlipidemia     Hypertension     Hyperthyroidism     last assessed: 10/28/16     Past Surgical History:   Procedure Laterality Date    COLONOSCOPY      EPIDURAL BLOCK INJECTION N/A 1/17/2019    Procedure: L5 S1 Lumbar Epidural Steroid Injection (65253);   Surgeon: Corby Nunez MD;  Location: [facility];  Service: Pain Management     EPIDURAL BLOCK INJECTION Left 3/6/2019    Procedure: L4- L5 and L5-S1 transforaminal epidural steroid injection;  Surgeon: Corby Nunez MD;  Location: [facility];  Service: Pain Management     FL GUIDED NEEDLE PLAC BX/ASP/INJ  3/6/2019    INGUINAL HERNIA REPAIR Bilateral      Social History     Substance and Sexual Activity   Alcohol Use No     Social History     Substance and Sexual Activity   Drug Use No     Social History     Tobacco Use   Smoking Status Current Every Day Smoker    Packs/day: 1 00    Types: Cigarettes   Smokeless Tobacco Never Used     Family History:   Family History   Problem Relation Age of Onset    Heart disease Mother         cardiac disorder    Heart disease Father cardiac disorder    Heart disease Maternal Grandmother         cardiac disorder    Heart disease Maternal Grandfather         cardiac disorder    Heart disease Paternal Grandmother         cardiac disorder    Heart disease Paternal Grandfather         cardiac disorder       Meds/Allergies         No Known Allergies    Objective   Vitals: Blood pressure (!) 178/86, pulse 61, temperature 98 8 °F (37 1 °C), temperature source Tympanic, resp  rate 16, height 6' (1 829 m), weight 75 8 kg (167 lb), SpO2 99 %  , Body mass index is 22 65 kg/m² , Orthostatic Blood Pressures      Most Recent Value   Blood Pressure  (!) 178/86 filed at 07/02/2020 0822          No intake or output data in the 24 hours ending 07/02/20 0906    Invasive Devices     Peripheral Intravenous Line            Peripheral IV 07/02/20 Left Hand less than 1 day                    Physical Exam:  Physical Exam    Gen: No acute distress  HEENT: anicteric, mucous membranes moist  Neck: supple, no jugular venous distention, or carotid bruit  Heart: regular, normal s1 and s2, no murmur/rub or gallop  Lungs :clear to auscultation bilaterally, no rales/rhonchi or wheeze  Abdomen: soft nontender, normoactive bowel sounds, no organomegaly  Ext: warm and perfused, normal femoral pulses, no edema, clubbing  Skin: warm, no rashes  Neuro: AAO x 3, no focal findings  Psychiatric: normal affect  Musculoskeletal: no obvious joint deformities      Lab Results:     Results from last 7 days   Lab Units 06/29/20  1035   POTASSIUM mmol/L 4 5   CHLORIDE mmol/L 101   CO2 mmol/L 27   BUN mg/dL 12   CREATININE mg/dL 0 93   CALCIUM mg/dL 8 5             Results from last 7 days   Lab Units 06/29/20  1035   WBC Thousand/uL 8 96   HEMOGLOBIN g/dL 13 1   HEMATOCRIT % 39 0   PLATELETS Thousands/uL 326       Lab Results   Component Value Date    CHOL 182 06/22/2015    CHOL 156 09/19/2013     Lab Results   Component Value Date    HDL 64 06/22/2015    HDL 49 09/19/2013     Lab Results Component Value Date    LDLCALC 104 (H) 06/22/2015    LDLCALC 91 09/19/2013     Lab Results   Component Value Date    TRIG 70 06/22/2015    TRIG 79 09/19/2013       Lab Results   Component Value Date    ALT 34 06/22/2015    ALT 27 06/24/2014    AST 21 06/22/2015    AST 20 06/24/2014             No results found for: NTBNP    No results found for: HGBA1C

## 2020-07-02 NOTE — DISCHARGE INSTRUCTIONS
Hold your plavix for one day after your cardiac cath- you can restart this on Saturday, July 4th 2020  No other changes to your medications  Please follow up with Dr Washington Solares as scheduled in September  If you have any questions or concerns do not hesitate to call our office right away ([phone])        After Radial Heart Catheterization   WHAT YOU NEED TO KNOW:   What will happen after a radial heart catheterization? · You will be attached to a heart monitor until you are fully awake  A heart monitor is an EKG that stays on continuously to record your heart's electrical activity  Healthcare providers will monitor your vital signs and pulses in your arm  They will frequently check your pressure bandage for bleeding or swelling  · You may have a band wrapped tightly around your wrist  The band puts pressure on your wound and helps prevent bleeding  A healthcare provider can put air into the band or remove air from the band  A healthcare provider will gradually remove air from the band and decrease pressure on your wrist  The band may be removed in 2 hours or when your wound stops bleeding  · You will need to keep your wrist straight for 2 to 4 hours  Do not  push or pull with your arm  Arm movements can cause serious bleeding  After you are monitored for several hours, you may go home or may need to stay in the hospital overnight  What do I need to know before I go home? · Care for your wound as directed  Remove the pressure bandage in 24 hours or as directed  Mild bruising is normal and expected  A small bandage can be placed on your wound after you remove the pressure bandage  Do not put powders, lotions, or creams on your wound  They may cause your wound to get infected  Monitor your wound every day for signs of infection, such as redness, swelling, or pus  · Shower the day after your procedure or as directed  Remove your pressure bandage before you shower   Do not take baths or go in hot tubs or pools  Carefully wash the wound with soap and water  Pat the area dry  A small bandage can be placed on your wound after you shower  · Apply firm, steady pressure to your wound if it bleeds  Apply pressure with a clean gauze or towel for 5 to 10 minutes  Call 911 if bleeding becomes heavy or does not stop  · Drink liquids as directed  Liquids will help flush the contrast liquid from your body  Ask how much liquid to drink each day and which liquids are best for you  · Do not lift anything heavier than 5 pounds until directed by your healthcare provider  Heavy lifting can put stress on your wound and cause bleeding  Do not push or pull with the arm that was used for the procedure  Do not do vigorous activity for at least 48 hours  Vigorous activity may cause bleeding from your wound  Rest and do quiet activities  Take short walks around the house to prevent a blood clot  Ask your healthcare provider when you can return to your normal activities  · Do not drive or return to work until your healthcare provider says it is okay  Your healthcare provider may tell you to wait 48 hours before you drive to decrease your risk for bleeding  You may not be able to return to work for at least 2 days after your procedure if your job involves heavy lifting  What medicines may I need? You may need any of the following:  · Blood thinners    help prevent blood clots  Examples of blood thinners include heparin and warfarin  Clots can cause strokes, heart attacks, and death  The following are general safety guidelines to follow while you are taking a blood thinner:    ¨ Watch for bleeding and bruising while you take blood thinners  Watch for bleeding from your gums or nose  Watch for blood in your urine and bowel movements  Use a soft washcloth on your skin, and a soft toothbrush to brush your teeth  This can keep your skin and gums from bleeding  If you shave, use an electric shaver   Do not play contact sports  ¨ Tell your dentist and other healthcare providers that you take anticoagulants  Wear a bracelet or necklace that says you take this medicine  ¨ Do not start or stop any medicines unless your healthcare provider tells you to  Many medicines cannot be used with blood thinners  ¨ Tell your healthcare provider right away if you forget to take the medicine, or if you take too much  ¨ Warfarin  is a blood thinner that you may need to take  The following are things you should be aware of if you take warfarin  § Foods and medicines can affect the amount of warfarin in your blood  Do not make major changes to your diet while you take warfarin  Warfarin works best when you eat about the same amount of vitamin K every day  Vitamin K is found in green leafy vegetables and certain other foods  Ask for more information about what to eat when you are taking warfarin  § You will need to see your healthcare provider for follow-up visits when you are on warfarin  You will need regular blood tests  These tests are used to decide how much medicine you need  · Acetaminophen  helps decrease pain and fever  This medicine is available without a doctor's order  Ask how much medicine is safe to take, and how often to take it  Acetaminophen can cause liver damage if not taken correctly  · Take your medicine as directed  Contact your healthcare provider if you think your medicine is not helping or if you have side effects  Tell him or her if you are allergic to any medicine  Keep a list of the medicines, vitamins, and herbs you take  Include the amounts, and when and why you take them  Bring the list or the pill bottles to follow-up visits  Carry your medicine list with you in case of an emergency    Call 911 for any of the following:   · You have any of the following signs of a heart attack:      ¨ Squeezing, pressure, or pain in your chest that lasts longer than 5 minutes or returns    ¨ Discomfort or pain in your back, neck, jaw, stomach, or arm     ¨ Trouble breathing    ¨ Nausea or vomiting    ¨ Lightheadedness or a sudden cold sweat, especially with chest pain or trouble breathing    · You have any of the following signs of a stroke:      ¨ Numbness or drooping on one side of your face     ¨ Weakness in an arm or leg    ¨ Confusion or difficulty speaking    ¨ Dizziness, a severe headache, or vision loss    · You feel lightheaded, short of breath, and have chest pain  · You cough up blood  · You have trouble breathing  · You cannot stop the bleeding from your wound even after you hold firm pressure for 10 minutes  When should I seek immediate care? · Blood soaks through your bandage  · Your stitches come apart  · Your hand or arm feels numb, cool, or looks pale  · Your wound gets swollen quickly  When should I contact my healthcare provider? · You have a fever or chills  · Your wound is red, swollen, or draining pus  · Your wound looks more bruised or you have new bruising on the side of your wrist      · You have nausea or are vomiting  · Your skin is itchy, swollen, or you have a rash  · You have questions or concerns about your condition or care  CARE AGREEMENT:   You have the right to help plan your care  Learn about your health condition and how it may be treated  Discuss treatment options with your caregivers to decide what care you want to receive  You always have the right to refuse treatment  The above information is an  only  It is not intended as medical advice for individual conditions or treatments  Talk to your doctor, nurse or pharmacist before following any medical regimen to see if it is safe and effective for you  © 2017 [employer] Information is for End User's use only and may not be sold, redistributed or otherwise used for commercial purposes   All illustrations and images included in CareNotes® are the copyrighted property of [employer]  or [employer] Kelly

## 2020-07-13 ENCOUNTER — OFFICE VISIT (OUTPATIENT)
Dept: FAMILY MEDICINE CLINIC | Facility: CLINIC | Age: 65
End: 2020-07-13
Payer: COMMERCIAL

## 2020-07-13 VITALS
TEMPERATURE: 98.5 F | HEART RATE: 71 BPM | OXYGEN SATURATION: 97 % | DIASTOLIC BLOOD PRESSURE: 68 MMHG | BODY MASS INDEX: 20.86 KG/M2 | SYSTOLIC BLOOD PRESSURE: 122 MMHG | WEIGHT: 154 LBS | HEIGHT: 72 IN

## 2020-07-13 DIAGNOSIS — R93.89 ABNORMAL CT OF THE CHEST: Primary | ICD-10-CM

## 2020-07-13 DIAGNOSIS — J43.9 PULMONARY EMPHYSEMA, UNSPECIFIED EMPHYSEMA TYPE (HCC): ICD-10-CM

## 2020-07-13 DIAGNOSIS — F11.20 UNCOMPLICATED OPIOID DEPENDENCE (HCC): ICD-10-CM

## 2020-07-13 DIAGNOSIS — Z00.00 MEDICARE ANNUAL WELLNESS VISIT, SUBSEQUENT: ICD-10-CM

## 2020-07-13 DIAGNOSIS — I21.4 NSTEMI (NON-ST ELEVATED MYOCARDIAL INFARCTION) (HCC): ICD-10-CM

## 2020-07-13 PROCEDURE — 99213 OFFICE O/P EST LOW 20 MIN: CPT | Performed by: FAMILY MEDICINE

## 2020-07-13 PROCEDURE — 1125F AMNT PAIN NOTED PAIN PRSNT: CPT | Performed by: FAMILY MEDICINE

## 2020-07-13 PROCEDURE — 3008F BODY MASS INDEX DOCD: CPT | Performed by: FAMILY MEDICINE

## 2020-07-13 PROCEDURE — 3074F SYST BP LT 130 MM HG: CPT | Performed by: FAMILY MEDICINE

## 2020-07-13 PROCEDURE — 4004F PT TOBACCO SCREEN RCVD TLK: CPT | Performed by: FAMILY MEDICINE

## 2020-07-13 PROCEDURE — G0438 PPPS, INITIAL VISIT: HCPCS | Performed by: FAMILY MEDICINE

## 2020-07-13 PROCEDURE — 1170F FXNL STATUS ASSESSED: CPT | Performed by: FAMILY MEDICINE

## 2020-07-13 PROCEDURE — 4040F PNEUMOC VAC/ADMIN/RCVD: CPT | Performed by: FAMILY MEDICINE

## 2020-07-13 PROCEDURE — 3078F DIAST BP <80 MM HG: CPT | Performed by: FAMILY MEDICINE

## 2020-07-13 RX ORDER — ATORVASTATIN CALCIUM 80 MG/1
80 TABLET, FILM COATED ORAL EVERY EVENING
COMMUNITY
Start: 2020-06-30 | End: 2021-04-14 | Stop reason: SDUPTHER

## 2020-07-13 NOTE — PROGRESS NOTES
Assessment and Plan:     Problem List Items Addressed This Visit     None           Preventive health issues were discussed with patient, and age appropriate screening tests were ordered as noted in patient's After Visit Summary  Personalized health advice and appropriate referrals for health education or preventive services given if needed, as noted in patient's After Visit Summary  History of Present Illness:     Patient presents for Welcome to Medicare visit  Patient Care Team:  Liliya Posadas MD as PCP - General  [facility] (Pain Medicine)     Review of Systems:     Review of Systems   Constitutional: Negative for chills and fever  HENT: Negative for congestion and rhinorrhea  Eyes: Negative for visual disturbance  Respiratory: Negative for cough and shortness of breath  Cardiovascular: Negative for chest pain and palpitations  Gastrointestinal: Negative for abdominal pain, diarrhea, nausea and vomiting  Genitourinary: Negative for dysuria  Musculoskeletal: Negative for back pain  Neurological: Negative for dizziness and headaches  Hematological: Does not bruise/bleed easily  Psychiatric/Behavioral: Negative for agitation        Problem List:     Patient Active Problem List   Diagnosis    Encounter for hepatitis C screening test for low risk patient    Pain    Anxiety    Benign essential hypertension    Chronic obstructive pulmonary disease ([facility] [address] )    Screening for neurological condition    Chronic low back pain    Early satiety    GERD (gastroesophageal reflux disease)    Fatigue    Headache    Incisional hernia    Insomnia    Left knee pain    Neck pain    Osteoarthritis of both knees    Other chest pain    Other instability, right knee    Pain in joint of left shoulder    Pain of left hip    Weight loss, non-intentional    Lumbar spondylosis    Bulging of lumbar intervertebral disc    Low back pain    Displacement of lumbar intervertebral disc without myelopathy    Screening for AAA (abdominal aortic aneurysm)    Medicare annual wellness visit, initial    Lumbar radiculopathy    Uncomplicated opioid dependence ([address] )    NSTEMI (non-ST elevated myocardial infarction[facility])      Past Medical and Surgical History:     Past Medical History:   Diagnosis Date    Chronic pain disorder     back    Hyperlipidemia     Hypertension     Hyperthyroidism     last assessed: 10/28/16     Past Surgical History:   Procedure Laterality Date    COLONOSCOPY      EPIDURAL BLOCK INJECTION N/A 1/17/2019    Procedure: L5 S1 Lumbar Epidural Steroid Injection (22341);   Surgeon: Maxwell Carranza MD;  Location: [facility];  Service: Pain Management     EPIDURAL BLOCK INJECTION Left 3/6/2019    Procedure: L4- L5 and L5-S1 transforaminal epidural steroid injection;  Surgeon: Maxwell Carranza MD;  Location: [facility];  Service: Pain Management     FL GUIDED NEEDLE PLAC BX/ASP/INJ  3/6/2019    INGUINAL HERNIA REPAIR Bilateral       Family History:     Family History   Problem Relation Age of Onset    Heart disease Mother         cardiac disorder    Heart disease Father         cardiac disorder    Heart disease Maternal Grandmother         cardiac disorder    Heart disease Maternal Grandfather         cardiac disorder    Heart disease Paternal Grandmother         cardiac disorder    Heart disease Paternal Grandfather         cardiac disorder      Social History:        Social History     Socioeconomic History    Marital status: Single     Spouse name: Not on file    Number of children: Not on file    Years of education: Not on file    Highest education level: Not on file   Occupational History    Not on file   Social Needs    Financial resource strain: Not on file    Food insecurity:     Worry: Not on file     Inability: Not on file    Transportation needs:     Medical: Not on file     Non-medical: Not on file   Tobacco Use    Smoking status: Current Every Day Smoker     Packs/day: 1 00     Types: Cigarettes    Smokeless tobacco: Never Used   Substance and Sexual Activity    Alcohol use: No    Drug use: No    Sexual activity: Not on file   Lifestyle    Physical activity:     Days per week: Not on file     Minutes per session: Not on file    Stress: Not on file   Relationships    Social connections:     Talks on phone: Not on file     Gets together: Not on file     Attends Judaism service: Not on file     Active member of club or organization: Not on file     Attends meetings of clubs or organizations: Not on file     Relationship status: Not on file    Intimate partner violence:     Fear of current or ex partner: Not on file     Emotionally abused: Not on file     Physically abused: Not on file     Forced sexual activity: Not on file   Other Topics Concern    Not on file   Social History Narrative    Not on file      Medications and Allergies:     Current Outpatient Medications   Medication Sig Dispense Refill    acetaminophen-codeine (TYLENOL #4) 300-60 MG per tablet take 1 tablet by mouth every 6 hours if needed FOR MODERATE PAIN 120 tablet 5    albuterol (2 5 mg/3 mL) 0 083 % nebulizer solution Take 1 vial (2 5 mg total) by nebulization every 4 (four) hours as needed for wheezing or shortness of breath 100 vial 5    albuterol (VENTOLIN HFA) 90 mcg/act inhaler Inhale 2 puffs every 4 (four) hours as needed for wheezing 18 g 5    ALPRAZolam (XANAX) 0 25 mg tablet take 1 tablet by mouth three times a day if needed for ANXIETY 90 tablet 5    aspirin (Aspirin 81) 81 mg EC tablet Take 1 tablet (81 mg total) by mouth daily 90 tablet 3    atorvastatin (LIPITOR) 80 mg tablet Take 80 mg by mouth every evening      clopidogrel (PLAVIX) 75 mg tablet Take 1 tablet (75 mg total) by mouth daily 90 tablet 3    cyclobenzaprine (FLEXERIL) 10 mg tablet Take 1 tablet (10 mg total) by mouth 3 (three) times a day as needed for muscle spasms 30 tablet 5    diclofenac sodium (VOLTAREN) 1 % Apply 4 g topically 4 (four) times a day 500 g 5    DULoxetine (CYMBALTA) 60 mg delayed release capsule Take 1 capsule (60 mg total) by mouth daily 90 capsule 3    fluticasone-umeclidinium-vilanterol (TRELEGY) 100-62 5-25 MCG/INH inhaler Inhale 1 puff daily Rinse mouth after use  1 Inhaler 5    metoprolol succinate (TOPROL-XL) 25 mg 24 hr tablet Take 1 tablet (25 mg total) by mouth daily 90 tablet 3    oxyCODONE-acetaminophen (PERCOCET) 5-325 mg per tablet Take 1 tablet by mouth every 6 (six) hours as needed for moderate pain or severe painMax Daily Amount: 4 tablets 100 tablet 0    pantoprazole (PROTONIX) 40 mg tablet Take 1 tablet (40 mg total) by mouth daily 90 tablet 3    zolpidem (AMBIEN) 5 mg tablet Take 1 tablet (5 mg total) by mouth daily at bedtime as needed for sleep 30 tablet 0     No current facility-administered medications for this visit  No Known Allergies   Immunizations:     Immunization History   Administered Date(s) Administered     Influenza (IM) Preservative Free 08/01/2015    H1N1, All Formulations 01/08/2010    INFLUENZA 11/03/2011, 01/01/2012, 01/01/2014, 08/26/2014, 08/01/2015, 08/15/2016, 09/23/2017, 09/09/2018    Influenza Quadrivalent Preservative Free 3 years and older IM 09/23/2017    Influenza TIV (IM) 08/26/2014    Pneumococcal Conjugate 13-Valent 08/01/2015    Pneumococcal Conjugate PCV 7 01/01/2011    Pneumococcal Polysaccharide PPV23 09/09/2018    Tdap 08/26/2014    Tuberculin Skin Test-PPD Intradermal 10/04/2013, 10/23/2014    Zoster Vaccine Recombinant 06/17/2020      Health Maintenance:         Topic Date Due    CRC Screening: Colonoscopy  10/29/2023    Hepatitis C Screening  Completed         Topic Date Due    Influenza Vaccine  07/01/2020      Medicare Screening Tests and Risk Assessments:     Damien Hurst is here for his Subsequent Wellness visit  Health Risk Assessment:   Patient rates overall health as good   Patient feels that their physical health rating is same  Eyesight was rated as same  Hearing was rated as same  Patient feels that their emotional and mental health rating is same  Pain experienced in the last 7 days has been a lot  Patient's pain rating has been 7/10  Patient states that he has experienced weight loss or gain in last 6 months  Depression Screening:   PHQ-2 Score: 2      Fall Risk Screening: In the past year, patient has experienced: history of falling in past year      Home Safety:  Patient does not have trouble with stairs inside or outside of their home  Patient has working smoke alarms and has no working carbon monoxide detector  Home safety hazards include: none  Nutrition:   Current diet is Regular and Limited junk food  Medications:   Patient is not currently taking any over-the-counter supplements  Patient is able to manage medications  Activities of Daily Living (ADLs)/Instrumental Activities of Daily Living (IADLs):   Walk and transfer into and out of bed and chair?: Yes  Dress and groom yourself?: Yes    Bathe or shower yourself?: Yes    Feed yourself? Yes  Do your laundry/housekeeping?: Yes  Manage your money, pay your bills and track your expenses?: Yes  Make your own meals?: Yes    Do your own shopping?: Yes    Advance Care Planning:   Living will: Yes    Advanced directive: Yes      PREVENTIVE SCREENINGS      Cardiovascular Screening:    General: Screening Not Indicated and History Lipid Disorder      Diabetes Screening:     General: Screening Current      Colorectal Cancer Screening:     General: Screening Current      Abdominal Aortic Aneurysm (AAA) Screening:    Risk factors include: age between 73-67 yo and tobacco use        Lung Cancer Screening:     General: Screening Not Indicated      Hepatitis C Screening:    General: Screening Current    No exam data present     Physical Exam:     There were no vitals taken for this visit      Physical Exam   Constitutional: He is oriented to person, place, and time  He appears well-developed  No distress  HENT:   Head: Normocephalic  Eyes: Pupils are equal, round, and reactive to light  Conjunctivae are normal  Right eye exhibits no discharge  Left eye exhibits no discharge  No scleral icterus  Neck: Normal range of motion  Cardiovascular: Normal rate, regular rhythm and normal heart sounds  No murmur heard  Pulmonary/Chest: Effort normal and breath sounds normal  No respiratory distress  He has no wheezes  Abdominal: Soft  Bowel sounds are normal  He exhibits no distension  There is no tenderness  Musculoskeletal: He exhibits no edema or tenderness  Lymphadenopathy:     He has no cervical adenopathy  Neurological: He is alert and oriented to person, place, and time  Skin: Skin is warm  He is not diaphoretic  Psychiatric: He has a normal mood and affect  Nursing note and vitals reviewed        Ramiro Connor MD

## 2020-07-13 NOTE — PROGRESS NOTES
Assessment/Plan:    Michelle Mondragon is a 75year old male with PMHx of GERD, COPD, HTN, NSTEMI in March 2020, knee osteoarthritis, lumbar radiculopathy, anxiety presents today for annual medicare wellness visit  Will repeat CT chest for abnormal finding (see under HPI) for f/u  Patient has no acute complaints or concerns at this time  Recent Cardiac cath with normal epicardial vessel and normal LVEF  F/u in 3 months  Case d/w Dr Hernández Head  Diagnoses and all orders for this visit:    Abnormal CT of the chest  -     CT chest wo contrast; Future    Pulmonary emphysema, unspecified emphysema type (HCC)    NSTEMI (non-ST elevated myocardial infarction) ([facility] [address] )  - continue Plavix and ASA; Lipitor 80 mg PO daily, Toprol XL 25 mg PO daily  - BP controlled today    Uncomplicated opioid dependence ([facility] )    Medicare Wellness Annual, Subsequent visit    Other orders  -     atorvastatin (LIPITOR) 80 mg tablet; Take 80 mg by mouth every evening        Subjective:      Patient ID: Mohsen Redmond is a 75 y o  male  HPI     Patient is a 75year old male with PMHx of GERD, COPD, HTN, NSTEMI in March 2020, knee osteoarthritis, lumbar radiculopathy, anxiety presents today for annual medicare wellness visit  Patient is currently on Plavix, ASA 81 mg PO daily, high intensity statin  Patient recently underwent cardiac catheterization due to abnormal stress test (stress test suggested ischemia in main coronary artery)  Cardiac cath on 7/2/20 revealed normal epicardial vessels and LVEF of 60%  Patient underwent CT chest in March 2020 for evaluation of chronic dyspnea showed 5 8 x 2 0 cm "pseudomass due to atelectasis vs  Neoplasm vs  Infectious process"  Patient is chronic smoker and has no desire to quit smoking at this time  He takes Trelegy and Albuterol scheduled  No supplemental oxygen needed  PFT in October 2019 showed FEV1/FVC ratio of 68% and FEV1 of 62%  Denied any worsening dyspnea, orthopnea, chest pain, dizziness  BP controlled today at 122/68 mmHg  The following portions of the patient's history were reviewed and updated as appropriate: allergies, current medications, past family history, past medical history, past social history, past surgical history and problem list     Review of Systems   Constitutional: Negative for chills and fever  HENT: Negative for congestion and rhinorrhea  Eyes: Negative for visual disturbance  Respiratory: Negative for cough and shortness of breath  Cardiovascular: Negative for chest pain and palpitations  Gastrointestinal: Negative for abdominal pain, diarrhea, nausea and vomiting  Genitourinary: Negative for dysuria  Musculoskeletal: Negative for back pain  Skin: Negative for rash  Neurological: Negative for dizziness and headaches  Psychiatric/Behavioral: Negative for confusion  Objective:      /68   Pulse 71   Temp 98 5 °F (36 9 °C)   Ht 6' (1 829 m)   Wt 69 9 kg (154 lb)   SpO2 97%   BMI 20 89 kg/m²          Physical Exam   Constitutional: He is oriented to person, place, and time  He appears well-developed  No distress  HENT:   Head: Normocephalic  Eyes: Conjunctivae are normal  Right eye exhibits no discharge  Left eye exhibits no discharge  No scleral icterus  Neck: Normal range of motion  Cardiovascular: Normal rate, regular rhythm and normal heart sounds  No murmur heard  No carotid bruits bilaterally  Pulmonary/Chest: Effort normal and breath sounds normal  No respiratory distress  He has no wheezes  He has no rales  Abdominal: Soft  Bowel sounds are normal  He exhibits no distension  There is no tenderness  Musculoskeletal: He exhibits no edema or tenderness  Lymphadenopathy:     He has no cervical adenopathy  Neurological: He is alert and oriented to person, place, and time  Skin: Skin is warm  Psychiatric: He has a normal mood and affect  Nursing note and vitals reviewed

## 2020-07-21 DIAGNOSIS — G47.00 INSOMNIA, UNSPECIFIED TYPE: ICD-10-CM

## 2020-07-21 RX ORDER — ZOLPIDEM TARTRATE 5 MG/1
TABLET ORAL
Qty: 30 TABLET | Refills: 5 | Status: SHIPPED | OUTPATIENT
Start: 2020-07-21 | End: 2021-01-15 | Stop reason: SDUPTHER

## 2020-07-28 DIAGNOSIS — R52 PAIN: ICD-10-CM

## 2020-07-28 RX ORDER — OXYCODONE HYDROCHLORIDE AND ACETAMINOPHEN 5; 325 MG/1; MG/1
1 TABLET ORAL EVERY 6 HOURS PRN
Qty: 100 TABLET | Refills: 0 | Status: SHIPPED | OUTPATIENT
Start: 2020-07-28 | End: 2020-08-24 | Stop reason: SDUPTHER

## 2020-08-12 DIAGNOSIS — R52 PAIN: ICD-10-CM

## 2020-08-12 RX ORDER — CYCLOBENZAPRINE HCL 10 MG
10 TABLET ORAL 3 TIMES DAILY PRN
Qty: 30 TABLET | Refills: 0 | Status: SHIPPED | OUTPATIENT
Start: 2020-08-12 | End: 2020-12-23 | Stop reason: SDUPTHER

## 2020-08-18 DIAGNOSIS — J44.9 CHRONIC OBSTRUCTIVE PULMONARY DISEASE, UNSPECIFIED COPD TYPE (HCC): ICD-10-CM

## 2020-08-18 RX ORDER — ALBUTEROL SULFATE 90 UG/1
AEROSOL, METERED RESPIRATORY (INHALATION)
Qty: 8.5 G | Refills: 5 | Status: SHIPPED | OUTPATIENT
Start: 2020-08-18 | End: 2020-12-23 | Stop reason: SDUPTHER

## 2020-08-24 DIAGNOSIS — R52 PAIN: ICD-10-CM

## 2020-08-24 RX ORDER — OXYCODONE HYDROCHLORIDE AND ACETAMINOPHEN 5; 325 MG/1; MG/1
1 TABLET ORAL EVERY 6 HOURS PRN
Qty: 100 TABLET | Refills: 0 | Status: SHIPPED | OUTPATIENT
Start: 2020-08-24 | End: 2020-09-21 | Stop reason: SDUPTHER

## 2020-08-24 RX ORDER — OXYCODONE HYDROCHLORIDE AND ACETAMINOPHEN 5; 325 MG/1; MG/1
1 TABLET ORAL EVERY 6 HOURS PRN
Qty: 100 TABLET | Refills: 0 | OUTPATIENT
Start: 2020-08-24

## 2020-09-15 DIAGNOSIS — F41.9 ANXIETY: ICD-10-CM

## 2020-09-15 RX ORDER — ALPRAZOLAM 0.25 MG/1
TABLET ORAL
Qty: 90 TABLET | Refills: 5 | Status: SHIPPED | OUTPATIENT
Start: 2020-09-15 | End: 2021-03-12

## 2020-09-21 DIAGNOSIS — R52 PAIN: ICD-10-CM

## 2020-09-21 RX ORDER — OXYCODONE HYDROCHLORIDE AND ACETAMINOPHEN 5; 325 MG/1; MG/1
1 TABLET ORAL EVERY 6 HOURS PRN
Qty: 100 TABLET | Refills: 0 | Status: SHIPPED | OUTPATIENT
Start: 2020-09-21 | End: 2020-10-19 | Stop reason: SDUPTHER

## 2020-10-10 DIAGNOSIS — I10 BENIGN ESSENTIAL HYPERTENSION: ICD-10-CM

## 2020-10-12 RX ORDER — ALBUTEROL SULFATE 2.5 MG/3ML
SOLUTION RESPIRATORY (INHALATION)
Qty: 300 ML | Refills: 5 | Status: SHIPPED | OUTPATIENT
Start: 2020-10-12 | End: 2021-11-15 | Stop reason: SDUPTHER

## 2020-10-13 ENCOUNTER — TELEPHONE (OUTPATIENT)
Dept: OTHER | Facility: OTHER | Age: 65
End: 2020-10-13

## 2020-10-15 ENCOUNTER — TELEMEDICINE (OUTPATIENT)
Dept: FAMILY MEDICINE CLINIC | Facility: CLINIC | Age: 65
End: 2020-10-15
Payer: COMMERCIAL

## 2020-10-15 DIAGNOSIS — R91.8 ABNORMAL CT SCAN OF LUNG: ICD-10-CM

## 2020-10-15 DIAGNOSIS — J06.9 UPPER RESPIRATORY TRACT INFECTION, UNSPECIFIED TYPE: Primary | ICD-10-CM

## 2020-10-15 PROCEDURE — 99442 PR PHYS/QHP TELEPHONE EVALUATION 11-20 MIN: CPT | Performed by: FAMILY MEDICINE

## 2020-10-19 DIAGNOSIS — R52 PAIN: ICD-10-CM

## 2020-10-19 RX ORDER — OXYCODONE HYDROCHLORIDE AND ACETAMINOPHEN 5; 325 MG/1; MG/1
1 TABLET ORAL EVERY 6 HOURS PRN
Qty: 100 TABLET | Refills: 0 | Status: SHIPPED | OUTPATIENT
Start: 2020-10-19 | End: 2020-11-02 | Stop reason: SDUPTHER

## 2020-10-26 ENCOUNTER — OFFICE VISIT (OUTPATIENT)
Dept: CARDIOLOGY CLINIC | Facility: CLINIC | Age: 65
End: 2020-10-26
Payer: COMMERCIAL

## 2020-10-26 ENCOUNTER — HOSPITAL ENCOUNTER (OUTPATIENT)
Dept: CT IMAGING | Facility: HOSPITAL | Age: 65
Discharge: HOME | End: 2020-10-26
Attending: FAMILY MEDICINE
Payer: COMMERCIAL

## 2020-10-26 VITALS
BODY MASS INDEX: 21.13 KG/M2 | WEIGHT: 156 LBS | SYSTOLIC BLOOD PRESSURE: 140 MMHG | HEIGHT: 72 IN | HEART RATE: 70 BPM | DIASTOLIC BLOOD PRESSURE: 74 MMHG

## 2020-10-26 DIAGNOSIS — R77.8 ELEVATED TROPONIN LEVEL NOT DUE MYOCARDIAL INFARCTION: Primary | ICD-10-CM

## 2020-10-26 DIAGNOSIS — R93.89 ABNORMAL CT OF THE CHEST: ICD-10-CM

## 2020-10-26 DIAGNOSIS — J43.9 PULMONARY EMPHYSEMA, UNSPECIFIED EMPHYSEMA TYPE (HCC): ICD-10-CM

## 2020-10-26 PROCEDURE — 3008F BODY MASS INDEX DOCD: CPT | Performed by: INTERNAL MEDICINE

## 2020-10-26 PROCEDURE — 1160F RVW MEDS BY RX/DR IN RCRD: CPT | Performed by: INTERNAL MEDICINE

## 2020-10-26 PROCEDURE — 3078F DIAST BP <80 MM HG: CPT | Performed by: INTERNAL MEDICINE

## 2020-10-26 PROCEDURE — 71250 CT THORAX DX C-: CPT

## 2020-10-26 PROCEDURE — 99214 OFFICE O/P EST MOD 30 MIN: CPT | Performed by: INTERNAL MEDICINE

## 2020-10-26 PROCEDURE — 3077F SYST BP >= 140 MM HG: CPT | Performed by: INTERNAL MEDICINE

## 2020-10-30 ENCOUNTER — TELEPHONE (OUTPATIENT)
Dept: FAMILY MEDICINE CLINIC | Facility: CLINIC | Age: 65
End: 2020-10-30

## 2020-10-30 DIAGNOSIS — R91.1 LUNG NODULE < 6CM ON CT: Primary | ICD-10-CM

## 2020-11-02 ENCOUNTER — TELEPHONE (OUTPATIENT)
Dept: FAMILY MEDICINE CLINIC | Facility: CLINIC | Age: 65
End: 2020-11-02

## 2020-11-02 DIAGNOSIS — R52 PAIN: ICD-10-CM

## 2020-11-02 RX ORDER — OXYCODONE HYDROCHLORIDE AND ACETAMINOPHEN 5; 325 MG/1; MG/1
1 TABLET ORAL EVERY 6 HOURS PRN
Qty: 10 TABLET | Refills: 0 | Status: SHIPPED | OUTPATIENT
Start: 2020-11-02 | End: 2020-11-16 | Stop reason: SDUPTHER

## 2020-11-16 DIAGNOSIS — R52 PAIN: ICD-10-CM

## 2020-11-16 RX ORDER — OXYCODONE HYDROCHLORIDE AND ACETAMINOPHEN 5; 325 MG/1; MG/1
1 TABLET ORAL EVERY 6 HOURS PRN
Qty: 10 TABLET | Refills: 0 | OUTPATIENT
Start: 2020-11-16

## 2020-11-16 RX ORDER — OXYCODONE HYDROCHLORIDE AND ACETAMINOPHEN 5; 325 MG/1; MG/1
1 TABLET ORAL EVERY 6 HOURS PRN
Qty: 120 TABLET | Refills: 0 | Status: SHIPPED | OUTPATIENT
Start: 2020-11-16 | End: 2020-12-14 | Stop reason: SDUPTHER

## 2020-11-24 DIAGNOSIS — J44.9 CHRONIC OBSTRUCTIVE PULMONARY DISEASE, UNSPECIFIED COPD TYPE (HCC): ICD-10-CM

## 2020-12-07 DIAGNOSIS — R52 PAIN: ICD-10-CM

## 2020-12-07 RX ORDER — ACETAMINOPHEN AND CODEINE PHOSPHATE 60; 300 MG/1; MG/1
1 TABLET ORAL EVERY 6 HOURS PRN
Qty: 120 TABLET | Refills: 0 | Status: SHIPPED | OUTPATIENT
Start: 2020-12-07 | End: 2021-01-05 | Stop reason: SDUPTHER

## 2020-12-08 RX ORDER — ACETAMINOPHEN AND CODEINE PHOSPHATE 60; 300 MG/1; MG/1
1 TABLET ORAL EVERY 6 HOURS PRN
Qty: 120 TABLET | Refills: 0 | OUTPATIENT
Start: 2020-12-08

## 2020-12-14 DIAGNOSIS — R52 PAIN: ICD-10-CM

## 2020-12-14 RX ORDER — OXYCODONE HYDROCHLORIDE AND ACETAMINOPHEN 5; 325 MG/1; MG/1
1 TABLET ORAL EVERY 6 HOURS PRN
Qty: 120 TABLET | Refills: 0 | Status: SHIPPED | OUTPATIENT
Start: 2020-12-14 | End: 2021-01-11 | Stop reason: SDUPTHER

## 2020-12-23 DIAGNOSIS — R52 PAIN: ICD-10-CM

## 2020-12-23 DIAGNOSIS — J44.9 CHRONIC OBSTRUCTIVE PULMONARY DISEASE, UNSPECIFIED COPD TYPE (HCC): ICD-10-CM

## 2020-12-23 RX ORDER — CYCLOBENZAPRINE HCL 10 MG
10 TABLET ORAL 3 TIMES DAILY PRN
Qty: 30 TABLET | Refills: 0 | Status: SHIPPED | OUTPATIENT
Start: 2020-12-23 | End: 2021-01-14

## 2020-12-23 RX ORDER — ALBUTEROL SULFATE 90 UG/1
2 AEROSOL, METERED RESPIRATORY (INHALATION) EVERY 6 HOURS PRN
Qty: 8.5 G | Refills: 5 | Status: SHIPPED | OUTPATIENT
Start: 2020-12-23 | End: 2021-06-15 | Stop reason: SDUPTHER

## 2021-01-05 DIAGNOSIS — R52 PAIN: ICD-10-CM

## 2021-01-05 RX ORDER — ACETAMINOPHEN AND CODEINE PHOSPHATE 60; 300 MG/1; MG/1
1 TABLET ORAL EVERY 6 HOURS PRN
Qty: 120 TABLET | Refills: 0 | Status: SHIPPED | OUTPATIENT
Start: 2021-01-05 | End: 2021-02-01 | Stop reason: SDUPTHER

## 2021-01-11 DIAGNOSIS — R52 PAIN: ICD-10-CM

## 2021-01-11 RX ORDER — OXYCODONE HYDROCHLORIDE AND ACETAMINOPHEN 5; 325 MG/1; MG/1
1 TABLET ORAL EVERY 6 HOURS PRN
Qty: 120 TABLET | Refills: 0 | Status: SHIPPED | OUTPATIENT
Start: 2021-01-11 | End: 2021-02-08 | Stop reason: SDUPTHER

## 2021-01-14 RX ORDER — FLUCONAZOLE 100 MG/1
TABLET ORAL
COMMUNITY
Start: 2020-12-14 | End: 2021-07-13

## 2021-01-15 ENCOUNTER — OFFICE VISIT (OUTPATIENT)
Dept: FAMILY MEDICINE CLINIC | Facility: CLINIC | Age: 66
End: 2021-01-15
Payer: COMMERCIAL

## 2021-01-15 VITALS
HEIGHT: 72 IN | OXYGEN SATURATION: 97 % | BODY MASS INDEX: 20.99 KG/M2 | SYSTOLIC BLOOD PRESSURE: 122 MMHG | WEIGHT: 155 LBS | TEMPERATURE: 97.3 F | DIASTOLIC BLOOD PRESSURE: 74 MMHG | HEART RATE: 71 BPM

## 2021-01-15 DIAGNOSIS — J96.01 ACUTE RESPIRATORY FAILURE WITH HYPOXIA (HCC): ICD-10-CM

## 2021-01-15 DIAGNOSIS — I10 BENIGN ESSENTIAL HYPERTENSION: ICD-10-CM

## 2021-01-15 DIAGNOSIS — R52 PAIN: ICD-10-CM

## 2021-01-15 DIAGNOSIS — J43.9 PULMONARY EMPHYSEMA, UNSPECIFIED EMPHYSEMA TYPE (HCC): Primary | ICD-10-CM

## 2021-01-15 DIAGNOSIS — F41.9 ANXIETY: ICD-10-CM

## 2021-01-15 DIAGNOSIS — I21.4 NSTEMI (NON-ST ELEVATED MYOCARDIAL INFARCTION) (HCC): ICD-10-CM

## 2021-01-15 DIAGNOSIS — F11.20 UNCOMPLICATED OPIOID DEPENDENCE (HCC): ICD-10-CM

## 2021-01-15 DIAGNOSIS — G47.00 INSOMNIA, UNSPECIFIED TYPE: ICD-10-CM

## 2021-01-15 PROCEDURE — 3008F BODY MASS INDEX DOCD: CPT | Performed by: FAMILY MEDICINE

## 2021-01-15 PROCEDURE — 4004F PT TOBACCO SCREEN RCVD TLK: CPT | Performed by: FAMILY MEDICINE

## 2021-01-15 PROCEDURE — 3074F SYST BP LT 130 MM HG: CPT | Performed by: FAMILY MEDICINE

## 2021-01-15 PROCEDURE — 3078F DIAST BP <80 MM HG: CPT | Performed by: FAMILY MEDICINE

## 2021-01-15 PROCEDURE — 3725F SCREEN DEPRESSION PERFORMED: CPT | Performed by: FAMILY MEDICINE

## 2021-01-15 PROCEDURE — 99214 OFFICE O/P EST MOD 30 MIN: CPT | Performed by: FAMILY MEDICINE

## 2021-01-15 PROCEDURE — 1160F RVW MEDS BY RX/DR IN RCRD: CPT | Performed by: FAMILY MEDICINE

## 2021-01-15 RX ORDER — NALOXONE HYDROCHLORIDE 4 MG/.1ML
SPRAY NASAL
Qty: 1 EACH | Refills: 1 | Status: SHIPPED | OUTPATIENT
Start: 2021-01-15

## 2021-01-15 RX ORDER — ZOLPIDEM TARTRATE 10 MG/1
10 TABLET ORAL
Qty: 30 TABLET | Refills: 5 | Status: SHIPPED | OUTPATIENT
Start: 2021-01-15 | End: 2021-06-15 | Stop reason: SDUPTHER

## 2021-01-15 NOTE — PROGRESS NOTES
Assessment/Plan:    No problem-specific Assessment & Plan notes found for this encounter  Diagnoses and all orders for this visit:    Pulmonary emphysema, unspecified emphysema type ([facility] [address] )  -     naloxone (NARCAN) 4 mg/0 1 mL nasal spray; Administer 1 spray into a nostril  If no response after 2-3 minutes, give another dose in the other nostril using a new spray  Benign essential hypertension  -     naloxone (NARCAN) 4 mg/0 1 mL nasal spray; Administer 1 spray into a nostril  If no response after 2-3 minutes, give another dose in the other nostril using a new spray  NSTEMI (non-ST elevated myocardial infarction) (HCC)  -     naloxone (NARCAN) 4 mg/0 1 mL nasal spray; Administer 1 spray into a nostril  If no response after 2-3 minutes, give another dose in the other nostril using a new spray  Pain  -     naloxone (NARCAN) 4 mg/0 1 mL nasal spray; Administer 1 spray into a nostril  If no response after 2-3 minutes, give another dose in the other nostril using a new spray  Anxiety  -     naloxone (NARCAN) 4 mg/0 1 mL nasal spray; Administer 1 spray into a nostril  If no response after 2-3 minutes, give another dose in the other nostril using a new spray  Uncomplicated opioid dependence (HCC)  -     naloxone (NARCAN) 4 mg/0 1 mL nasal spray; Administer 1 spray into a nostril  If no response after 2-3 minutes, give another dose in the other nostril using a new spray  Acute respiratory failure with hypoxia (HCC)  -     naloxone (NARCAN) 4 mg/0 1 mL nasal spray; Administer 1 spray into a nostril  If no response after 2-3 minutes, give another dose in the other nostril using a new spray  Insomnia, unspecified type  -     zolpidem (AMBIEN) 10 mg tablet;  Take 1 tablet (10 mg total) by mouth daily at bedtime as needed for sleep    Other orders  -     fluconazole (DIFLUCAN) 100 mg tablet; TAKE 2 TABS (200MG) ON DAY 1; THEN 1 TAB DAILY FOR THE NEXT 9 DAYS          Subjective:      Patient ID: Manasa Singerney Dhruv Hall is a 77 y o  male  His COPD is stable  He is not interested in smoking cessation  Has no shortness of breath or increased sputum production  He has no cough  He has no dyspnea on exertion  He has hypertension  His blood pressure is well controlled on his current regimen  He has no chest pain or palpitations  He has no PND or orthopnea  He has no headache or vision changes  He has a history of a non ST segment elevation MI  He underwent cardiac catheterization and did not require stenting  He is on high-intensity statin therapy  He has anxiety  He has comorbid chronic pain  He is on duloxetine for combination of the 2  In addition he takes Xanax as needed  He is aware of the potential interaction with the his opiate based pain medication  He regarding his chronic pain:  He has a signed treatment agreement in the chart  His urine toxicology consistent with regular use of his prescribed medication  There have been no unanticipated results with his urine toxicology  We have discussed the risks and benefits of long-term use of opiate based pain medication and he and I are both in agreement that his case the benefits outweigh the risks  I prescribed Narcan for him today  He is up-to-date with his flu shot, Pneumovax, and Prevnar 13  The following portions of the patient's history were reviewed and updated as appropriate:   He  has a past medical history of Chronic pain disorder, Hyperlipidemia, Hypertension, and Hyperthyroidism    He   Patient Active Problem List    Diagnosis Date Noted    Acute respiratory failure with hypoxia ([facility] [address] ) 01/15/2021    Abnormal CT scan of lung 10/15/2020    Upper respiratory tract infection 10/15/2020    NSTEMI (non-ST elevated myocardial infarction) ([facility] [address] ) 61/97/8623    Uncomplicated opioid dependence ([facility] [address] ) 01/21/2020    Lumbar radiculopathy 02/26/2019    Screening for AAA (abdominal aortic aneurysm) 01/18/2019    Medicare annual wellness visit, initial 01/18/2019    Low back pain 12/19/2018    Displacement of lumbar intervertebral disc without myelopathy 12/19/2018    Lumbar spondylosis 12/14/2018    Bulging of lumbar intervertebral disc 12/14/2018    Screening for neurological condition 10/18/2018    Encounter for hepatitis C screening test for low risk patient 04/13/2018    Pain 04/13/2018    Benign essential hypertension 01/15/2018    Osteoarthritis of both knees 10/18/2017    Other instability, right knee 10/18/2017    Left knee pain 09/09/2016    Pain of left hip 09/09/2016    Neck pain 08/05/2016    Pain in joint of left shoulder 08/05/2016    Other chest pain 06/07/2016    Chronic low back pain 10/08/2015    Insomnia 09/29/2015    Early satiety 02/26/2015    GERD (gastroesophageal reflux disease) 10/31/2014    Headache 05/16/2014    Incisional hernia 10/11/2013    Weight loss, non-intentional 10/04/2013    Fatigue 06/26/2013    Anxiety 11/14/2012    Chronic obstructive pulmonary disease ([facility] [address] ) 11/14/2012     He  has a past surgical history that includes Inguinal hernia repair (Bilateral); Epidural block injection (N/A, 1/17/2019); Epidural block injection (Left, 3/6/2019); FL guided needle plac bx/asp/inj (3/6/2019); Colonoscopy; and Cardiac catheterization (07/02/2020)  His family history includes Heart disease in his father, maternal grandfather, maternal grandmother, mother, paternal grandfather, and paternal grandmother  He  reports that he has been smoking cigarettes  He has been smoking about 1 00 pack per day  He has never used smokeless tobacco  He reports that he does not drink alcohol or use drugs    Current Outpatient Medications   Medication Sig Dispense Refill    acetaminophen-codeine (TYLENOL #4) 300-60 MG per tablet Take 1 tablet by mouth every 6 (six) hours as needed for moderate pain 120 tablet 0    albuterol (2 5 mg/3 mL) 0 083 % nebulizer solution inhale contents of 1 vial ( 3 milliliters ) in nebulizer by mouth every 4 hours 300 mL 5    albuterol (PROVENTIL HFA,VENTOLIN HFA) 90 mcg/act inhaler Inhale 2 puffs every 6 (six) hours as needed for wheezing 8 5 g 5    ALPRAZolam (XANAX) 0 25 mg tablet take 1 tablet by mouth three times a day if needed for ANXIETY 90 tablet 5    aspirin (Aspirin 81) 81 mg EC tablet Take 1 tablet (81 mg total) by mouth daily 90 tablet 3    atorvastatin (LIPITOR) 80 mg tablet Take 80 mg by mouth every evening      clopidogrel (PLAVIX) 75 mg tablet Take 1 tablet (75 mg total) by mouth daily 90 tablet 3    diclofenac sodium (VOLTAREN) 1 % Apply 4 g topically 4 (four) times a day 500 g 5    DULoxetine (CYMBALTA) 60 mg delayed release capsule Take 1 capsule (60 mg total) by mouth daily 90 capsule 3    fluticasone-umeclidinium-vilanterol (TRELEGY) 100-62 5-25 MCG/INH inhaler Inhale 1 puff daily Rinse mouth after use  1 Inhaler 0    metoprolol succinate (TOPROL-XL) 25 mg 24 hr tablet Take 1 tablet (25 mg total) by mouth daily 90 tablet 3    oxyCODONE-acetaminophen (PERCOCET) 5-325 mg per tablet Take 1 tablet by mouth every 6 (six) hours as needed for moderate pain or severe painMax Daily Amount: 4 tablets 120 tablet 0    pantoprazole (PROTONIX) 40 mg tablet Take 1 tablet (40 mg total) by mouth daily 90 tablet 3    zolpidem (AMBIEN) 10 mg tablet Take 1 tablet (10 mg total) by mouth daily at bedtime as needed for sleep 30 tablet 5    fluconazole (DIFLUCAN) 100 mg tablet TAKE 2 TABS (200MG) ON DAY 1; THEN 1 TAB DAILY FOR THE NEXT 9 DAYS      naloxone (NARCAN) 4 mg/0 1 mL nasal spray Administer 1 spray into a nostril  If no response after 2-3 minutes, give another dose in the other nostril using a new spray  1 each 1     No current facility-administered medications for this visit        Current Outpatient Medications on File Prior to Visit   Medication Sig    acetaminophen-codeine (TYLENOL #4) 300-60 MG per tablet Take 1 tablet by mouth every 6 (six) hours as needed for moderate pain    albuterol (2 5 mg/3 mL) 0 083 % nebulizer solution inhale contents of 1 vial ( 3 milliliters ) in nebulizer by mouth every 4 hours    albuterol (PROVENTIL HFA,VENTOLIN HFA) 90 mcg/act inhaler Inhale 2 puffs every 6 (six) hours as needed for wheezing    ALPRAZolam (XANAX) 0 25 mg tablet take 1 tablet by mouth three times a day if needed for ANXIETY    aspirin (Aspirin 81) 81 mg EC tablet Take 1 tablet (81 mg total) by mouth daily    atorvastatin (LIPITOR) 80 mg tablet Take 80 mg by mouth every evening    clopidogrel (PLAVIX) 75 mg tablet Take 1 tablet (75 mg total) by mouth daily    diclofenac sodium (VOLTAREN) 1 % Apply 4 g topically 4 (four) times a day    DULoxetine (CYMBALTA) 60 mg delayed release capsule Take 1 capsule (60 mg total) by mouth daily    fluticasone-umeclidinium-vilanterol (TRELEGY) 100-62 5-25 MCG/INH inhaler Inhale 1 puff daily Rinse mouth after use   metoprolol succinate (TOPROL-XL) 25 mg 24 hr tablet Take 1 tablet (25 mg total) by mouth daily    oxyCODONE-acetaminophen (PERCOCET) 5-325 mg per tablet Take 1 tablet by mouth every 6 (six) hours as needed for moderate pain or severe painMax Daily Amount: 4 tablets    pantoprazole (PROTONIX) 40 mg tablet Take 1 tablet (40 mg total) by mouth daily    [DISCONTINUED] zolpidem (AMBIEN) 5 mg tablet take 1 tablet by mouth daily at bedtime if needed for sleep    fluconazole (DIFLUCAN) 100 mg tablet TAKE 2 TABS (200MG) ON DAY 1; THEN 1 TAB DAILY FOR THE NEXT 9 DAYS     No current facility-administered medications on file prior to visit  He has No Known Allergies       Review of Systems   All other systems reviewed and are negative  Objective:      /74   Pulse 71   Temp (!) 97 3 °F (36 3 °C)   Ht 6' (1 829 m)   Wt 70 3 kg (155 lb)   SpO2 97%   BMI 21 02 kg/m²          Physical Exam  Vitals signs and nursing note reviewed  Constitutional:       Appearance: Normal appearance  He is normal weight     HENT: Head: Normocephalic and atraumatic  Neck:      Musculoskeletal: Normal range of motion and neck supple  Cardiovascular:      Rate and Rhythm: Normal rate and regular rhythm  Pulmonary:      Effort: Pulmonary effort is normal       Breath sounds: Normal breath sounds  Abdominal:      General: Abdomen is flat  Bowel sounds are normal       Palpations: Abdomen is soft  Musculoskeletal: Normal range of motion  Skin:     General: Skin is warm and dry  Capillary Refill: Capillary refill takes less than 2 seconds  Neurological:      General: No focal deficit present  Mental Status: He is alert and oriented to person, place, and time  Mental status is at baseline  Psychiatric:         Mood and Affect: Mood normal          Behavior: Behavior normal          Thought Content:  Thought content normal          Judgment: Judgment normal

## 2021-01-19 DIAGNOSIS — J44.9 CHRONIC OBSTRUCTIVE PULMONARY DISEASE, UNSPECIFIED COPD TYPE (HCC): ICD-10-CM

## 2021-01-19 RX ORDER — FLUTICASONE FUROATE, UMECLIDINIUM BROMIDE AND VILANTEROL TRIFENATATE 100; 62.5; 25 UG/1; UG/1; UG/1
POWDER RESPIRATORY (INHALATION)
Qty: 3 INHALER | Refills: 3 | Status: SHIPPED | OUTPATIENT
Start: 2021-01-19 | End: 2021-11-15 | Stop reason: SDUPTHER

## 2021-02-01 DIAGNOSIS — R52 PAIN: ICD-10-CM

## 2021-02-02 RX ORDER — ACETAMINOPHEN AND CODEINE PHOSPHATE 60; 300 MG/1; MG/1
1 TABLET ORAL EVERY 6 HOURS PRN
Qty: 120 TABLET | Refills: 0 | Status: SHIPPED | OUTPATIENT
Start: 2021-02-02 | End: 2021-03-02 | Stop reason: SDUPTHER

## 2021-02-08 DIAGNOSIS — R52 PAIN: ICD-10-CM

## 2021-02-08 RX ORDER — OXYCODONE HYDROCHLORIDE AND ACETAMINOPHEN 5; 325 MG/1; MG/1
1 TABLET ORAL EVERY 6 HOURS PRN
Qty: 120 TABLET | Refills: 0 | Status: SHIPPED | OUTPATIENT
Start: 2021-02-08 | End: 2021-03-08 | Stop reason: SDUPTHER

## 2021-02-11 DIAGNOSIS — R52 PAIN: ICD-10-CM

## 2021-02-11 RX ORDER — DULOXETIN HYDROCHLORIDE 60 MG/1
CAPSULE, DELAYED RELEASE ORAL
Qty: 90 CAPSULE | Refills: 3 | Status: SHIPPED | OUTPATIENT
Start: 2021-02-11 | End: 2022-01-31

## 2021-03-02 DIAGNOSIS — R52 PAIN: ICD-10-CM

## 2021-03-02 RX ORDER — ACETAMINOPHEN AND CODEINE PHOSPHATE 60; 300 MG/1; MG/1
1 TABLET ORAL EVERY 6 HOURS PRN
Qty: 120 TABLET | Refills: 0 | Status: SHIPPED | OUTPATIENT
Start: 2021-03-02 | End: 2021-03-30 | Stop reason: SDUPTHER

## 2021-03-08 DIAGNOSIS — R52 PAIN: ICD-10-CM

## 2021-03-08 RX ORDER — OXYCODONE HYDROCHLORIDE AND ACETAMINOPHEN 5; 325 MG/1; MG/1
1 TABLET ORAL EVERY 6 HOURS PRN
Qty: 120 TABLET | Refills: 0 | Status: SHIPPED | OUTPATIENT
Start: 2021-03-08 | End: 2021-04-05 | Stop reason: SDUPTHER

## 2021-03-10 DIAGNOSIS — Z23 ENCOUNTER FOR IMMUNIZATION: ICD-10-CM

## 2021-03-12 DIAGNOSIS — F41.9 ANXIETY: ICD-10-CM

## 2021-03-12 RX ORDER — ALPRAZOLAM 0.25 MG/1
TABLET ORAL
Qty: 90 TABLET | Refills: 5 | Status: SHIPPED | OUTPATIENT
Start: 2021-03-12 | End: 2021-09-07 | Stop reason: SDUPTHER

## 2021-03-22 ENCOUNTER — IMMUNIZATIONS (OUTPATIENT)
Dept: FAMILY MEDICINE CLINIC | Facility: HOSPITAL | Age: 66
End: 2021-03-22

## 2021-03-22 DIAGNOSIS — Z23 ENCOUNTER FOR IMMUNIZATION: Primary | ICD-10-CM

## 2021-03-22 PROCEDURE — 91300 SARS-COV-2 / COVID-19 MRNA VACCINE (PFIZER-BIONTECH) 30 MCG: CPT

## 2021-03-22 PROCEDURE — 0001A SARS-COV-2 / COVID-19 MRNA VACCINE (PFIZER-BIONTECH) 30 MCG: CPT

## 2021-03-30 DIAGNOSIS — R52 PAIN: ICD-10-CM

## 2021-03-30 RX ORDER — ACETAMINOPHEN AND CODEINE PHOSPHATE 60; 300 MG/1; MG/1
1 TABLET ORAL EVERY 6 HOURS PRN
Qty: 120 TABLET | Refills: 0 | Status: SHIPPED | OUTPATIENT
Start: 2021-03-30 | End: 2021-04-27 | Stop reason: SDUPTHER

## 2021-04-05 DIAGNOSIS — R52 PAIN: ICD-10-CM

## 2021-04-05 RX ORDER — OXYCODONE HYDROCHLORIDE AND ACETAMINOPHEN 5; 325 MG/1; MG/1
1 TABLET ORAL EVERY 6 HOURS PRN
Qty: 120 TABLET | Refills: 0 | Status: SHIPPED | OUTPATIENT
Start: 2021-04-05 | End: 2021-05-03 | Stop reason: SDUPTHER

## 2021-04-09 ENCOUNTER — RA CDI HCC (OUTPATIENT)
Dept: OTHER | Facility: HOSPITAL | Age: 66
End: 2021-04-09

## 2021-04-14 ENCOUNTER — IMMUNIZATIONS (OUTPATIENT)
Dept: FAMILY MEDICINE CLINIC | Facility: HOSPITAL | Age: 66
End: 2021-04-14

## 2021-04-14 DIAGNOSIS — Z23 ENCOUNTER FOR IMMUNIZATION: Primary | ICD-10-CM

## 2021-04-14 DIAGNOSIS — I21.4 NSTEMI (NON-ST ELEVATED MYOCARDIAL INFARCTION) (HCC): Primary | ICD-10-CM

## 2021-04-14 PROCEDURE — 91300 SARS-COV-2 / COVID-19 MRNA VACCINE (PFIZER-BIONTECH) 30 MCG: CPT

## 2021-04-14 PROCEDURE — 0002A SARS-COV-2 / COVID-19 MRNA VACCINE (PFIZER-BIONTECH) 30 MCG: CPT

## 2021-04-14 RX ORDER — ATORVASTATIN CALCIUM 80 MG/1
80 TABLET, FILM COATED ORAL EVERY EVENING
Qty: 90 TABLET | Refills: 3 | Status: SHIPPED | OUTPATIENT
Start: 2021-04-14 | End: 2022-03-31

## 2021-04-15 ENCOUNTER — OFFICE VISIT (OUTPATIENT)
Dept: FAMILY MEDICINE CLINIC | Facility: CLINIC | Age: 66
End: 2021-04-15
Payer: COMMERCIAL

## 2021-04-15 VITALS
WEIGHT: 157 LBS | HEART RATE: 71 BPM | HEIGHT: 72 IN | OXYGEN SATURATION: 97 % | SYSTOLIC BLOOD PRESSURE: 126 MMHG | BODY MASS INDEX: 21.26 KG/M2 | TEMPERATURE: 97.6 F | DIASTOLIC BLOOD PRESSURE: 74 MMHG

## 2021-04-15 DIAGNOSIS — F11.20 UNCOMPLICATED OPIOID DEPENDENCE (HCC): ICD-10-CM

## 2021-04-15 DIAGNOSIS — I25.10 ASCVD (ARTERIOSCLEROTIC CARDIOVASCULAR DISEASE): ICD-10-CM

## 2021-04-15 DIAGNOSIS — J43.9 PULMONARY EMPHYSEMA, UNSPECIFIED EMPHYSEMA TYPE (HCC): Primary | ICD-10-CM

## 2021-04-15 DIAGNOSIS — M47.816 LUMBAR SPONDYLOSIS: ICD-10-CM

## 2021-04-15 DIAGNOSIS — R91.8 ABNORMAL CT SCAN OF LUNG: ICD-10-CM

## 2021-04-15 DIAGNOSIS — I10 BENIGN ESSENTIAL HYPERTENSION: ICD-10-CM

## 2021-04-15 DIAGNOSIS — M51.26 BULGING OF LUMBAR INTERVERTEBRAL DISC: ICD-10-CM

## 2021-04-15 DIAGNOSIS — K21.9 GASTROESOPHAGEAL REFLUX DISEASE, UNSPECIFIED WHETHER ESOPHAGITIS PRESENT: ICD-10-CM

## 2021-04-15 PROBLEM — I21.4 NSTEMI (NON-ST ELEVATED MYOCARDIAL INFARCTION) (HCC): Status: RESOLVED | Noted: 2020-04-13 | Resolved: 2021-04-15

## 2021-04-15 PROCEDURE — 1160F RVW MEDS BY RX/DR IN RCRD: CPT | Performed by: FAMILY MEDICINE

## 2021-04-15 PROCEDURE — 3074F SYST BP LT 130 MM HG: CPT | Performed by: FAMILY MEDICINE

## 2021-04-15 PROCEDURE — 3078F DIAST BP <80 MM HG: CPT | Performed by: FAMILY MEDICINE

## 2021-04-15 PROCEDURE — 3008F BODY MASS INDEX DOCD: CPT | Performed by: FAMILY MEDICINE

## 2021-04-15 PROCEDURE — 4004F PT TOBACCO SCREEN RCVD TLK: CPT | Performed by: FAMILY MEDICINE

## 2021-04-15 PROCEDURE — 99214 OFFICE O/P EST MOD 30 MIN: CPT | Performed by: FAMILY MEDICINE

## 2021-04-15 NOTE — PROGRESS NOTES
Assessment/Plan:    GERD (gastroesophageal reflux disease)  Stable  Continue current  Chronic obstructive pulmonary disease (HCC)  Stable  Continue current    ASCVD (arteriosclerotic cardiovascular disease)  Stable  Continue current    Uncomplicated [facility] dependence ([facility] )  Stable  Continue current    Abnormal CT scan of lung  Needs to be scheduled for PET scan  Diagnoses and all orders for this visit:    Pulmonary emphysema, unspecified emphysema type ([facility] )    Benign essential hypertension    Gastroesophageal reflux disease, unspecified whether esophagitis present    ASCVD (arteriosclerotic cardiovascular disease)    Lumbar spondylosis    Bulging of lumbar intervertebral disc    Abnormal CT scan of lung    Uncomplicated opioid dependence ([facility] )    Other orders  -     Diclofenac Sodium (VOLTAREN) 1 %; apply 4 grams topically to affected area four times a day          Subjective:      Patient ID: Court Mark is a 77 y o  male  An abnormal CT of the chest   A PET scan was ordered but was not performed  He has a 50+ pack year smoking history  He has no cough or increased sputum production  He has no dyspnea  As per radiology's recommendation, the patient needs a PET scan to further evaluate the CT scan  He had non ST segment elevation MI in March of last year  He is on aspirin, metoprolol, and Lipitor  He was able discontinue his Plavix and March of this year  He has no bleeding episodes  He has no near-syncope, palpitations, diaphoresis, or other anginal symptoms  His lipids are at goal on his current regimen  He has normal liver function testing and muscle weakness  The following portions of the patient's history were reviewed and updated as appropriate:   He  has a past medical history of Chronic pain disorder, Hyperlipidemia, Hypertension, Hyperthyroidism, and NSTEMI (non-ST elevated myocardial infarction) ([facility] ) (4/13/2020)    He   Patient Active Problem List    Diagnosis Date Noted    ASCVD (arteriosclerotic cardiovascular disease) 04/15/2021    Acute respiratory failure with hypoxia (HCC) 01/15/2021    Abnormal CT scan of lung 10/15/2020    Upper respiratory tract infection 55/18/2732    Uncomplicated opioid dependence ([facility] [address] ) 01/21/2020    Lumbar radiculopathy 02/26/2019    Screening for AAA (abdominal aortic aneurysm) 01/18/2019    Medicare annual wellness visit, initial 01/18/2019    Low back pain 12/19/2018    Displacement of lumbar intervertebral disc without myelopathy 12/19/2018    Lumbar spondylosis 12/14/2018    Bulging of lumbar intervertebral disc 12/14/2018    Screening for neurological condition 10/18/2018    Encounter for hepatitis C screening test for low risk patient 04/13/2018    Pain 04/13/2018    Benign essential hypertension 01/15/2018    Osteoarthritis of both knees 10/18/2017    Other instability, right knee 10/18/2017    Left knee pain 09/09/2016    Pain of left hip 09/09/2016    Neck pain 08/05/2016    Pain in joint of left shoulder 08/05/2016    Other chest pain 06/07/2016    Chronic low back pain 10/08/2015    Insomnia 09/29/2015    Early satiety 02/26/2015    GERD (gastroesophageal reflux disease) 10/31/2014    Headache 05/16/2014    Incisional hernia 10/11/2013    Weight loss, non-intentional 10/04/2013    Fatigue 06/26/2013    Anxiety 11/14/2012    Chronic obstructive pulmonary disease ([facility] [address] ) 11/14/2012     He  has a past surgical history that includes Inguinal hernia repair (Bilateral); Epidural block injection (N/A, 1/17/2019); Epidural block injection (Left, 3/6/2019); FL guided needle plac bx/asp/inj (3/6/2019); Colonoscopy; and Cardiac catheterization (07/02/2020)  His family history includes Heart disease in his father, maternal grandfather, maternal grandmother, mother, paternal grandfather, and paternal grandmother  He  reports that he has been smoking cigarettes  He has been smoking about 1 00 pack per day   He has never used smokeless tobacco  He reports that he does not drink alcohol or use drugs  Current Outpatient Medications   Medication Sig Dispense Refill    acetaminophen-codeine (TYLENOL #4) 300-60 MG per tablet Take 1 tablet by mouth every 6 (six) hours as needed for moderate pain 120 tablet 0    albuterol (2 5 mg/3 mL) 0 083 % nebulizer solution inhale contents of 1 vial ( 3 milliliters ) in nebulizer by mouth every 4 hours 300 mL 5    albuterol (PROVENTIL HFA,VENTOLIN HFA) 90 mcg/act inhaler Inhale 2 puffs every 6 (six) hours as needed for wheezing 8 5 g 5    ALPRAZolam (XANAX) 0 25 mg tablet take 1 tablet by mouth three times a day if needed for ANXIETY 90 tablet 5    aspirin (Aspirin 81) 81 mg EC tablet Take 1 tablet (81 mg total) by mouth daily 90 tablet 3    atorvastatin (LIPITOR) 80 mg tablet Take 1 tablet (80 mg total) by mouth every evening 90 tablet 3    clopidogrel (PLAVIX) 75 mg tablet Take 1 tablet (75 mg total) by mouth daily 90 tablet 3    diclofenac sodium (VOLTAREN) 1 % Apply 4 g topically 4 (four) times a day 500 g 5    Diclofenac Sodium (VOLTAREN) 1 % apply 4 grams topically to affected area four times a day      DULoxetine (CYMBALTA) 60 mg delayed release capsule take 1 capsule by mouth once daily 90 capsule 3    fluconazole (DIFLUCAN) 100 mg tablet TAKE 2 TABS (200MG) ON DAY 1; THEN 1 TAB DAILY FOR THE NEXT 9 DAYS      metoprolol succinate (TOPROL-XL) 25 mg 24 hr tablet Take 1 tablet (25 mg total) by mouth daily 90 tablet 3    naloxone (NARCAN) 4 mg/0 1 mL nasal spray Administer 1 spray into a nostril  If no response after 2-3 minutes, give another dose in the other nostril using a new spray   1 each 1    oxyCODONE-acetaminophen (PERCOCET) 5-325 mg per tablet Take 1 tablet by mouth every 6 (six) hours as needed for moderate pain or severe painMax Daily Amount: 4 tablets 120 tablet 0    pantoprazole (PROTONIX) 40 mg tablet Take 1 tablet (40 mg total) by mouth daily 90 tablet 3  Trelegy Ellipta 100-62 5-25 MCG/INH inhaler INHALE 1 PUFF DAILY RINSE MOUTH AFTER USE 3 Inhaler 3    zolpidem (AMBIEN) 10 mg tablet Take 1 tablet (10 mg total) by mouth daily at bedtime as needed for sleep 30 tablet 5     No current facility-administered medications for this visit  Current Outpatient Medications on File Prior to Visit   Medication Sig    acetaminophen-codeine (TYLENOL #4) 300-60 MG per tablet Take 1 tablet by mouth every 6 (six) hours as needed for moderate pain    albuterol (2 5 mg/3 mL) 0 083 % nebulizer solution inhale contents of 1 vial ( 3 milliliters ) in nebulizer by mouth every 4 hours    albuterol (PROVENTIL HFA,VENTOLIN HFA) 90 mcg/act inhaler Inhale 2 puffs every 6 (six) hours as needed for wheezing    ALPRAZolam (XANAX) 0 25 mg tablet take 1 tablet by mouth three times a day if needed for ANXIETY    aspirin (Aspirin 81) 81 mg EC tablet Take 1 tablet (81 mg total) by mouth daily    atorvastatin (LIPITOR) 80 mg tablet Take 1 tablet (80 mg total) by mouth every evening    clopidogrel (PLAVIX) 75 mg tablet Take 1 tablet (75 mg total) by mouth daily    diclofenac sodium (VOLTAREN) 1 % Apply 4 g topically 4 (four) times a day    Diclofenac Sodium (VOLTAREN) 1 % apply 4 grams topically to affected area four times a day    DULoxetine (CYMBALTA) 60 mg delayed release capsule take 1 capsule by mouth once daily    fluconazole (DIFLUCAN) 100 mg tablet TAKE 2 TABS (200MG) ON DAY 1; THEN 1 TAB DAILY FOR THE NEXT 9 DAYS    metoprolol succinate (TOPROL-XL) 25 mg 24 hr tablet Take 1 tablet (25 mg total) by mouth daily    naloxone (NARCAN) 4 mg/0 1 mL nasal spray Administer 1 spray into a nostril  If no response after 2-3 minutes, give another dose in the other nostril using a new spray      oxyCODONE-acetaminophen (PERCOCET) 5-325 mg per tablet Take 1 tablet by mouth every 6 (six) hours as needed for moderate pain or severe painMax Daily Amount: 4 tablets    pantoprazole (PROTONIX) 40 mg tablet Take 1 tablet (40 mg total) by mouth daily    Trelegrisel Ellipta 100-62 5-25 MCG/INH inhaler INHALE 1 PUFF DAILY RINSE MOUTH AFTER USE    zolpidem (AMBIEN) 10 mg tablet Take 1 tablet (10 mg total) by mouth daily at bedtime as needed for sleep     No current facility-administered medications on file prior to visit  He has No Known Allergies       Review of Systems   All other systems reviewed and are negative  Objective:      /74   Pulse 71   Temp 97 6 °F (36 4 °C)   Ht 6' (1 829 m)   Wt 71 2 kg (157 lb)   SpO2 97%   BMI 21 29 kg/m²          Physical Exam  Vitals signs and nursing note reviewed  Constitutional:       Appearance: Normal appearance  He is normal weight  HENT:      Head: Normocephalic and atraumatic  Neck:      Musculoskeletal: Normal range of motion and neck supple  Cardiovascular:      Rate and Rhythm: Normal rate and regular rhythm  Pulses: Normal pulses  Heart sounds: Normal heart sounds  Pulmonary:      Effort: Pulmonary effort is normal       Breath sounds: Normal breath sounds  Abdominal:      General: Abdomen is flat  Bowel sounds are normal       Palpations: Abdomen is soft  Musculoskeletal: Normal range of motion  Skin:     General: Skin is warm and dry  Capillary Refill: Capillary refill takes less than 2 seconds  Neurological:      General: No focal deficit present  Mental Status: He is alert and oriented to person, place, and time  Mental status is at baseline  Psychiatric:         Mood and Affect: Mood normal          Behavior: Behavior normal          Thought Content:  Thought content normal          Judgment: Judgment normal

## 2021-04-16 DIAGNOSIS — M47.816 LUMBAR SPONDYLOSIS: Primary | ICD-10-CM

## 2021-04-21 ENCOUNTER — OPTICAL OFFICE (OUTPATIENT)
Dept: URBAN - NONMETROPOLITAN AREA CLINIC 4 | Facility: CLINIC | Age: 66
Setting detail: OPHTHALMOLOGY
End: 2021-04-21
Payer: COMMERCIAL

## 2021-04-21 ENCOUNTER — DOCTOR'S OFFICE (OUTPATIENT)
Dept: URBAN - NONMETROPOLITAN AREA CLINIC 1 | Facility: CLINIC | Age: 66
Setting detail: OPHTHALMOLOGY
End: 2021-04-21
Payer: COMMERCIAL

## 2021-04-21 DIAGNOSIS — H52.223: ICD-10-CM

## 2021-04-21 DIAGNOSIS — H52.4: ICD-10-CM

## 2021-04-21 PROCEDURE — 92004 COMPRE OPH EXAM NEW PT 1/>: CPT | Performed by: OPTOMETRIST

## 2021-04-21 PROCEDURE — V2784 LENS POLYCARB OR EQUAL: HCPCS | Performed by: OPTOMETRIST

## 2021-04-21 PROCEDURE — V2020 VISION SVCS FRAMES PURCHASES: HCPCS | Performed by: OPTOMETRIST

## 2021-04-21 PROCEDURE — V2303 LENS SPHCY TRIFOCAL 4.0/.12-: HCPCS | Performed by: OPTOMETRIST

## 2021-04-21 ASSESSMENT — REFRACTION_CURRENTRX
OS_ADD: +2.25
OS_VPRISM_DIRECTION: TRF
OS_CYLINDER: -0.25
OS_AXIS: 110
OD_VPRISM_DIRECTION: TRF
OS_OVR_VA: 20/
OD_AXIS: 093
OD_SPHERE: -2.25
OD_CYLINDER: -0.25
OD_ADD: +2.25
OD_OVR_VA: 20/
OS_SPHERE: -1.75

## 2021-04-21 ASSESSMENT — SPHEQUIV_DERIVED
OD_SPHEQUIV: -1.875
OD_SPHEQUIV: -2.125
OS_SPHEQUIV: -1.25
OS_SPHEQUIV: -1.75

## 2021-04-21 ASSESSMENT — REFRACTION_MANIFEST
OD_VA2: 20/25
OD_AXIS: 095
OD_SPHERE: -1.75
OS_VA1: 20/25-2
OD_VA1: 20/25
OS_CYLINDER: -0.50
OS_ADD: +2.50
OS_AXIS: 100
OS_SPHERE: -1.50
OD_ADD: +2.50
OS_VA2: 20/25-2
OD_CYLINDER: -0.75

## 2021-04-21 ASSESSMENT — REFRACTION_AUTOREFRACTION
OD_CYLINDER: -0.75
OS_AXIS: 099
OS_CYLINDER: -0.50
OS_SPHERE: -1.00
OD_AXIS: 096
OD_SPHERE: -1.50

## 2021-04-21 ASSESSMENT — VISUAL ACUITY
OD_BCVA: 20/30
OS_BCVA: 20/30-1

## 2021-04-21 ASSESSMENT — CONFRONTATIONAL VISUAL FIELD TEST (CVF)
OS_FINDINGS: FULL
OD_FINDINGS: FULL

## 2021-04-21 ASSESSMENT — TONOMETRY
OS_IOP_MMHG: 14
OD_IOP_MMHG: 14

## 2021-04-27 DIAGNOSIS — R52 PAIN: ICD-10-CM

## 2021-04-27 RX ORDER — ACETAMINOPHEN AND CODEINE PHOSPHATE 60; 300 MG/1; MG/1
1 TABLET ORAL EVERY 6 HOURS PRN
Qty: 120 TABLET | Refills: 0 | Status: SHIPPED | OUTPATIENT
Start: 2021-04-27 | End: 2021-05-25 | Stop reason: SDUPTHER

## 2021-05-02 DIAGNOSIS — R52 PAIN: ICD-10-CM

## 2021-05-03 DIAGNOSIS — R52 PAIN: ICD-10-CM

## 2021-05-03 RX ORDER — OXYCODONE HYDROCHLORIDE AND ACETAMINOPHEN 5; 325 MG/1; MG/1
1 TABLET ORAL EVERY 6 HOURS PRN
Qty: 120 TABLET | Refills: 0 | Status: SHIPPED | OUTPATIENT
Start: 2021-05-03 | End: 2021-06-01 | Stop reason: SDUPTHER

## 2021-05-03 RX ORDER — OXYCODONE HYDROCHLORIDE AND ACETAMINOPHEN 5; 325 MG/1; MG/1
1 TABLET ORAL EVERY 6 HOURS PRN
Qty: 120 TABLET | Refills: 0 | OUTPATIENT
Start: 2021-05-03

## 2021-05-25 DIAGNOSIS — R52 PAIN: ICD-10-CM

## 2021-05-25 RX ORDER — ACETAMINOPHEN AND CODEINE PHOSPHATE 60; 300 MG/1; MG/1
1 TABLET ORAL EVERY 6 HOURS PRN
Qty: 120 TABLET | Refills: 0 | OUTPATIENT
Start: 2021-05-25

## 2021-05-25 RX ORDER — ACETAMINOPHEN AND CODEINE PHOSPHATE 60; 300 MG/1; MG/1
1 TABLET ORAL EVERY 6 HOURS PRN
Qty: 120 TABLET | Refills: 0 | Status: SHIPPED | OUTPATIENT
Start: 2021-05-25 | End: 2021-06-22 | Stop reason: SDUPTHER

## 2021-05-26 ENCOUNTER — TELEPHONE (OUTPATIENT)
Dept: PAIN MEDICINE | Facility: CLINIC | Age: 66
End: 2021-05-26

## 2021-05-26 NOTE — TELEPHONE ENCOUNTER
Patient called in asking to be seen by Dr Yossi Eli  Patient is asking to transfer care to [facility], [facility]  - the OR location is closest for him   Thank you      [phone]

## 2021-06-01 DIAGNOSIS — G47.00 INSOMNIA, UNSPECIFIED TYPE: ICD-10-CM

## 2021-06-01 DIAGNOSIS — R52 PAIN: ICD-10-CM

## 2021-06-01 RX ORDER — OXYCODONE HYDROCHLORIDE AND ACETAMINOPHEN 5; 325 MG/1; MG/1
1 TABLET ORAL EVERY 6 HOURS PRN
Qty: 120 TABLET | Refills: 0 | Status: SHIPPED | OUTPATIENT
Start: 2021-06-01 | End: 2021-06-28 | Stop reason: SDUPTHER

## 2021-06-01 RX ORDER — METOPROLOL SUCCINATE 25 MG/1
25 TABLET, EXTENDED RELEASE ORAL DAILY
Qty: 90 TABLET | Refills: 0 | Status: SHIPPED | OUTPATIENT
Start: 2021-06-01 | End: 2021-06-07

## 2021-06-01 RX ORDER — CLOPIDOGREL BISULFATE 75 MG/1
75 TABLET ORAL DAILY
Qty: 90 TABLET | Refills: 0 | Status: SHIPPED | OUTPATIENT
Start: 2021-06-01 | End: 2021-06-07

## 2021-06-01 RX ORDER — OXYCODONE HYDROCHLORIDE AND ACETAMINOPHEN 5; 325 MG/1; MG/1
1 TABLET ORAL EVERY 6 HOURS PRN
Qty: 120 TABLET | Refills: 0 | Status: CANCELLED | OUTPATIENT
Start: 2021-06-01

## 2021-06-01 RX ORDER — ASPIRIN 81 MG/1
81 TABLET ORAL DAILY
Qty: 90 TABLET | Refills: 0 | Status: SHIPPED | OUTPATIENT
Start: 2021-06-01 | End: 2021-09-08

## 2021-06-07 DIAGNOSIS — G47.00 INSOMNIA, UNSPECIFIED TYPE: ICD-10-CM

## 2021-06-07 RX ORDER — CLOPIDOGREL BISULFATE 75 MG/1
TABLET ORAL
Qty: 90 TABLET | Refills: 0 | Status: SHIPPED | OUTPATIENT
Start: 2021-06-07 | End: 2021-11-22

## 2021-06-07 RX ORDER — METOPROLOL SUCCINATE 25 MG/1
TABLET, EXTENDED RELEASE ORAL
Qty: 90 TABLET | Refills: 0 | Status: SHIPPED | OUTPATIENT
Start: 2021-06-07 | End: 2021-11-15 | Stop reason: SDUPTHER

## 2021-06-15 DIAGNOSIS — R52 PAIN: ICD-10-CM

## 2021-06-15 DIAGNOSIS — G47.00 INSOMNIA, UNSPECIFIED TYPE: ICD-10-CM

## 2021-06-15 DIAGNOSIS — J44.9 CHRONIC OBSTRUCTIVE PULMONARY DISEASE, UNSPECIFIED COPD TYPE (HCC): ICD-10-CM

## 2021-06-16 ENCOUNTER — CONSULT (OUTPATIENT)
Dept: PAIN MEDICINE | Facility: CLINIC | Age: 66
End: 2021-06-16
Payer: COMMERCIAL

## 2021-06-16 VITALS
WEIGHT: 157 LBS | TEMPERATURE: 97.7 F | DIASTOLIC BLOOD PRESSURE: 80 MMHG | SYSTOLIC BLOOD PRESSURE: 124 MMHG | HEIGHT: 72 IN | BODY MASS INDEX: 21.26 KG/M2 | RESPIRATION RATE: 20 BRPM | HEART RATE: 64 BPM

## 2021-06-16 DIAGNOSIS — M53.3 SACROILIAC JOINT DYSFUNCTION OF LEFT SIDE: ICD-10-CM

## 2021-06-16 DIAGNOSIS — M54.16 LUMBAR RADICULOPATHY: Primary | ICD-10-CM

## 2021-06-16 PROCEDURE — 4004F PT TOBACCO SCREEN RCVD TLK: CPT | Performed by: ANESTHESIOLOGY

## 2021-06-16 PROCEDURE — 99204 OFFICE O/P NEW MOD 45 MIN: CPT | Performed by: ANESTHESIOLOGY

## 2021-06-16 PROCEDURE — 3008F BODY MASS INDEX DOCD: CPT | Performed by: FAMILY MEDICINE

## 2021-06-16 PROCEDURE — 3079F DIAST BP 80-89 MM HG: CPT | Performed by: ANESTHESIOLOGY

## 2021-06-16 PROCEDURE — 3008F BODY MASS INDEX DOCD: CPT | Performed by: ANESTHESIOLOGY

## 2021-06-16 PROCEDURE — 3074F SYST BP LT 130 MM HG: CPT | Performed by: ANESTHESIOLOGY

## 2021-06-16 RX ORDER — METHYLPREDNISOLONE ACETATE 80 MG/ML
80 INJECTION, SUSPENSION INTRA-ARTICULAR; INTRALESIONAL; INTRAMUSCULAR; PARENTERAL; SOFT TISSUE ONCE
Status: CANCELLED | OUTPATIENT
Start: 2021-06-16 | End: 2021-06-16

## 2021-06-16 RX ORDER — ZOLPIDEM TARTRATE 10 MG/1
10 TABLET ORAL
Qty: 30 TABLET | Refills: 5 | Status: SHIPPED | OUTPATIENT
Start: 2021-06-16 | End: 2021-07-15 | Stop reason: SDUPTHER

## 2021-06-16 RX ORDER — LIDOCAINE HYDROCHLORIDE 10 MG/ML
5 INJECTION, SOLUTION EPIDURAL; INFILTRATION; INTRACAUDAL; PERINEURAL ONCE
Status: CANCELLED | OUTPATIENT
Start: 2021-06-16 | End: 2021-06-16

## 2021-06-16 RX ORDER — BUPIVACAINE HCL/PF 2.5 MG/ML
4 VIAL (ML) INJECTION ONCE
Status: CANCELLED | OUTPATIENT
Start: 2021-06-16 | End: 2021-06-16

## 2021-06-16 RX ORDER — ALBUTEROL SULFATE 90 UG/1
2 AEROSOL, METERED RESPIRATORY (INHALATION) EVERY 6 HOURS PRN
Qty: 8.5 G | Refills: 0 | Status: SHIPPED | OUTPATIENT
Start: 2021-06-16 | End: 2021-07-15 | Stop reason: SDUPTHER

## 2021-06-16 RX ORDER — PANTOPRAZOLE SODIUM 40 MG/1
40 TABLET, DELAYED RELEASE ORAL DAILY
Qty: 90 TABLET | Refills: 0 | Status: SHIPPED | OUTPATIENT
Start: 2021-06-16 | End: 2021-09-10

## 2021-06-16 NOTE — PATIENT INSTRUCTIONS
Sacroiliac Joint Injection   WHAT YOU NEED TO KNOW:   What do I need to know about a sacroiliac joint injection? A sacroiliac (SI) joint injection is done to diagnose or treat pain from sacroiliac joint syndrome  The pain caused by this syndrome may be felt in your lower back, buttocks, groin, and your thigh  How do I prepare for an SI injection? Your healthcare provider will ask you to not take any pain medicine the day of the injection  Ask him if there are any other medicines you should not take  You will need to find someone to drive you home after your procedure  What will happen during the SI injection? You will be awake for your injection  You may be given calming medicine if you are anxious  · You will lie on your stomach with a pillow under your abdomen  Your healthcare provider will give you an injection of medicine to numb the area  He may use an x-ray, ultrasound, or CT scan to find the area to inject  You may also be given an injection of contrast material to help your SI joint show up better  Then, your healthcare provider will inject local anesthesia, antiinflammatory medicine, or both into your SI joint  · Healthcare providers will watch you closely for any problems for up to 30 minutes after your injection  Your healthcare provider will check to see if your pain decreases after the injection  What are the risks of an SI injection? You may have some weakness for a short time after your injection  The SI injection can cause bleeding, infection, and pain  It can also cause temporary weakness in your leg and problems urinating  You may have an allergic reaction to the medicine that is injected into your SI joint  Your pain may return and you may need more treatment  CARE AGREEMENT:   You have the right to help plan your care  Learn about your health condition and how it may be treated  Discuss treatment options with your healthcare providers to decide what care you want to receive   You always have the right to refuse treatment  The above information is an  only  It is not intended as medical advice for individual conditions or treatments  Talk to your doctor, nurse or pharmacist before following any medical regimen to see if it is safe and effective for you  © Copyright [address] Information is for End User's use only and may not be sold, redistributed or otherwise used for commercial purposes  All illustrations and images included in CareNotes® are the copyrighted property of A D A M , Inc  or [address] [place] [address]    Core Strengthening Exercises   WHAT YOU NEED TO KNOW:   Your core includes the muscles of your lower back, hip, pelvis, and abdomen  Core strengthening exercises help heal and strengthen these muscles  This helps prevent another injury, and keeps your pelvis, spine, and hips in the correct position  DISCHARGE INSTRUCTIONS:   Contact your healthcare provider if:   · You have sharp or worsening pain during exercise or at rest     · You have questions or concerns about your shoulder exercises  Safety tips:  Talk to your healthcare provider before you start an exercise program  A physical therapist can teach you how to do core strengthening exercises safely  · Do the exercises on a mat or firm surface  A firm surface will support your spine and prevent low back pain  Do not do these exercises on a bed  · Move slowly and smoothly  Avoid fast or jerky motions  · Stop if you feel pain  Core exercises should not be painful  Stop if you feel pain  · Breathe normally during core exercises  Do not hold your breath  This may cause an increase in blood pressure and prevent muscle strengthening  Your healthcare provider will tell you when to inhale and exhale during the exercise  · Begin all of your exercises with abdominal bracing  Abdominal bracing helps warm up your core muscles  You can also practice abdominal bracing throughout the day   Olga Chin on your back with your knees bent and feet flat on the floor  Place your arms in a relaxed position beside your body  Tighten your abdominal muscles  Pull your belly button in and up toward your spine  Hold for 5 seconds  Relax your muscles  Repeat 10 times  Core strengthening exercises: Your healthcare provider will tell you how often to do these exercises  The provider will also tell you how many repetitions of each exercise you should do  Hold each exercise for 5 seconds or as directed  As you get stronger, increase your hold to 10 to 15 seconds  You can do some of these exercises on a stability ball, or with a weight  Ask your healthcare provider how to use a stability ball or weight for these exercises:  · Bridging:  Lie on your back with your knees bent and feet flat on the floor  Rest your arms at your side  Tighten your buttocks, and then lift your hips 1 inch off the floor  Hold for 5 seconds  When you can do this exercise without pain for 10 seconds, increase the distance you lift your hips  A good goal is to be able to lift your hips so that your shoulders, hips, and knees are in a straight line  · Dead bug:  Lie on your back with your knees bent and feet flat on the floor  Place your arms in a relaxed position beside your body  Begin with abdominal bracing  Next, raise one leg, keeping your knee bent  Hold for 5 seconds  Repeat with the other leg  When you can do this exercise without pain for 10 to 15 seconds, you may raise one straight leg and hold  Repeat with the other leg  · Quadruped:  Place your hands and knees on the floor  Keep your wrists directly below your shoulders and your knees directly below your hips  Pull your belly button in toward your spine  Do not flatten or arch your back  Tighten your abdominal muscles below your belly button  Hold for 5 seconds  When you can do this exercise without pain for 10 to 15 seconds, you may extend one arm and hold   Repeat on the other side  · Side bridge exercises:      ? Standing side bridge:  Stand next to a wall and extend one arm toward the wall  Place your palm flat on the wall with your fingers pointing upward  Begin with abdominal bracing  Next, without moving your feet, slowly bend your arm to 90 degrees  Hold for 5 seconds  Repeat on the other side  When you can do this exercise without pain for 10 to 15 seconds, you may do the bent leg side bridge on the floor  ? Bent leg side bridge:  Lie on one side with your legs, hips, and shoulders in a straight line  Prop yourself up onto your forearm so your elbow is directly below your shoulder  Bend your knees back to 90 degrees  Begin with abdominal bracing  Next, lift your hips and balance yourself on your forearm and knees  Hold for 5 seconds  Repeat on the other side  When you can do this exercise without pain for 10 to 15 seconds, you may do the straight leg side bridge on the floor  ? Straight leg side bridge:  Lie on one side with your legs, hips, and shoulders in a straight line  Prop yourself up onto your forearm so your elbow is directly below your shoulder  Begin with abdominal bracing  Lift your hips off the floor and balance yourself on your forearm and the outside of your flexed foot  Do not let your ankle bend sideways  Hold for 5 seconds  Repeat on the other side  When you can do this exercise without pain for 10 to 15 seconds, ask your healthcare provider for more advanced exercises  · Superman:  Lie on your stomach  Extend your arms forward on the floor  Tighten your abdominal muscles and lift your right hand and left leg off the floor  Hold this position  Slowly return to the starting position  Tighten your abdominal muscles and lift your left hand and right leg off the floor  Hold this position  Slowly return to the starting position  · Clam:  Lie on your side with your knees bent   Put your bottom arm under your head to keep your neck in line  Put your top hand on your hip to keep your pelvis from moving  Put your heels together, and keep them together during this exercise  Slowly raise your top knee toward the ceiling  Then lower your leg so your knees are together  Repeat this exercise 10 times  Then switch sides and do the exercise 10 times with the other leg  · Curl up:  Lie on your back with your knees bent and feet flat on the floor  Place your hands, palms down, underneath your lower back  Next, with your elbows on the floor, lift your shoulders and chest 2 to 3 inches off the floor  Keep your head in line with your shoulders  Hold this position  Slowly return to the starting position  · Straight leg raises:  Lie on your back with one leg straight  Bend the other knee and place your foot flat on the floor  Tighten your abdominal muscles  Keep your leg straight and slowly lift it straight up 6 to 12 inches off the floor  Hold this position  Lower your leg slowly  Do as many repetitions as directed on this side  Repeat with the other leg  · Plank:  Lie on your stomach  Bend your elbows and place your forearms flat on the floor  Lift your chest, stomach, and knees off the floor  Make sure your elbows are below your shoulders  Your body should be in a straight line  Do not let your hips or lower back sink to the ground  Squeeze your abdominal muscles together and hold for 15 seconds  To make this exercise harder, hold for 30 seconds or lift 1 leg at a time  · Bicycles:  Lie on your back  Bend both knees and bring them toward your chest  Your calves should be parallel to the floor  Place the palms of your hands on the back of your head  Straighten your right leg and keep it lifted 2 inches off the floor  Raise your head and shoulders off the floor and twist towards your left  Keep your head and shoulders lifted  Bend your right knee while you straighten your left leg   Keep your left leg 2 inches off the floor  Twist your head and chest towards the left leg  Continue to straighten 1 leg at a time and twist        Follow up with your healthcare provider as directed:  Write down your questions so you remember to ask them during your visits  © Copyright [address] Information is for End User's use only and may not be sold, redistributed or otherwise used for commercial purposes  All illustrations and images included in CareNotes® are the copyrighted property of [employer] D [employer] M , [employer]  or [address] [place], [place] [address]jemal rex   The above information is an  only  It is not intended as medical advice for individual conditions or treatments  Talk to your doctor, nurse or pharmacist before following any medical regimen to see if it is safe and effective for you

## 2021-06-16 NOTE — PROGRESS NOTES
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  Ligamentum flavum infolding at L3-4 and L4-5 as well  Patient describes pain in left S1 dermatomal distribution accompanied by weakness numbness and paresthesias on occasion  Most of his pain seems to be centered around his left sacroiliac joint  Positive Juan Miguel's, positive Gaenslen's, positive SI joint loading left-sided  Tenderness to palpation over left sacroiliac joint  He had an  Epidural steroid injection in March of 2019 that did not provide significant benefit  He has been to physical therapy many times in the past without significant benefit  Additionally he is on opioid therapy  Will proceed with multimodal pain therapy plan in treating this patient's pain  While obtaining more recent imaging to guide therapy  Plan  -Left sacroiliac joint injection  -MRI lumbar spine noncontrast  -may continue analgesics as previously ordered; cautioned regarding concomitant benzodiazepines with opioids  -physical therapy for right-sided lumbar radiculopathy; Core exercises additionally provided for physician directed home PT that the patient plans to participate with for 1 hour, twice a week for the next 6 weeks  There are risks associated with opioid medications, including dependence, addiction and tolerance  The patient understands and agrees to use these medications only as prescribed  Potential side effects of the medications include, but are not limited to, constipation, drowsiness, addiction, impaired judgment and risk of fatal overdose if not taken as prescribed  The patient was warned against driving while taking sedation medications  Sharing medications is a felony  At this point in time, the patient is showing no signs of addiction, abuse, diversion or suicidal ideation  [place] Prescription Drug Monitoring Program report was reviewed and was appropriate     Complete risks and benefits including bleeding, infection, tissue reaction, nerve injury and allergic reaction were discussed  The approach was demonstrated using models and literature was provided  Verbal and written consent was obtained  My impressions and treatment recommendations were discussed in detail with the patient who verbalized understanding and had no further questions  Discharge instructions were provided  I personally saw and examined the patient and I agree with the above discussed plan of care  No orders of the defined types were placed in this encounter  History of Present Illness    Neha Hanna is a 77 y o  male with a pmhx of smoking, copd with 1ppd smoking history, GERD, CAD on plavix presenting with chronic lumbar radicular pain in the left S1 dermatomal distribution  Additionally describes left sided low back pain with achy, nagging, indolent, crampy stabbing and throbbing features  Debilitating weakness numbness and paresthesias accompany the pain  The patient rates the pain at a 8/10 accompanied by electric shock-like shooting features and crampy burning pain in the aforementioned dermatomal distributions  The pain is worse in the mornings as well as the end of the day; exertion such as walking for long periods of time seems to exacerbate the pain  The patient can hardly walk more than a few blocks without having debilitating pain  He tries to maintain an active lifestyle and finds that the current degree of pain seems to compromises his efforts  The pain significantly impacts independent activities of daily living and contributes to significant disability    He has attempted many courses of physical therapy without significant benefit in the past   He has taken percocet as well as tylenol #4 with limited relief of the pain as well  He has had left L4 and L5 epidural steroid injections in the past with 10% benefit  He denies any bowel or bladder dysfunction/incontinence    I have personally reviewed and/or updated the patient's past medical history, past surgical history, family history, social history, current medications, allergies, and vital signs today  Review of Systems   Constitutional: Positive for activity change  HENT: Negative  Eyes: Negative  Respiratory: Negative  Cardiovascular: Negative  Gastrointestinal: Negative  Endocrine: Negative  Genitourinary: Negative  Musculoskeletal: Positive for arthralgias, back pain, gait problem and myalgias  Skin: Negative  Allergic/Immunologic: Negative  Neurological: Positive for weakness and numbness  Hematological: Negative  Psychiatric/Behavioral: Negative  All other systems reviewed and are negative        Patient Active Problem List   Diagnosis    Encounter for hepatitis C screening test for low risk patient    Pain    Anxiety    Benign essential hypertension    Chronic obstructive pulmonary disease ([facility] [address] )    Screening for neurological condition    Chronic low back pain    Early satiety    GERD (gastroesophageal reflux disease)    Fatigue    Headache    Incisional hernia    Insomnia    Left knee pain    Neck pain    Osteoarthritis of both knees    Other chest pain    Other instability, right knee    Pain in joint of left shoulder    Pain of left hip    Weight loss, non-intentional    Lumbar spondylosis    Bulging of lumbar intervertebral disc    Low back pain    Displacement of lumbar intervertebral disc without myelopathy    Screening for AAA (abdominal aortic aneurysm)    Medicare annual wellness visit, initial    Lumbar radiculopathy    Uncomplicated opioid dependence ([address] )    Abnormal CT scan of lung    Upper respiratory tract infection    Acute respiratory failure with hypoxia (HCC)    ASCVD (arteriosclerotic cardiovascular disease)       Past Medical History:   Diagnosis Date    Chronic pain disorder     back    Hyperlipidemia     Hypertension     Hyperthyroidism     last assessed: 10/28/16    NSTEMI (non-ST elevated myocardial infarction) ([facility] [address] ) 4/13/2020       Past Surgical History:   Procedure Laterality Date    CARDIAC CATHETERIZATION  07/02/2020    EF 65% normal    COLONOSCOPY      EPIDURAL BLOCK INJECTION N/A 1/17/2019    Procedure: L5 S1 Lumbar Epidural Steroid Injection (13204);   Surgeon: Rosalina Fernando MD;  Location: [facility];  Service: Pain Management     EPIDURAL BLOCK INJECTION Left 3/6/2019    Procedure: L4- L5 and L5-S1 transforaminal epidural steroid injection;  Surgeon: Rosalina Fernando MD;  Location: [facility];  Service: Pain Management     FL GUIDED NEEDLE PLAC BX/ASP/INJ  3/6/2019    INGUINAL HERNIA REPAIR Bilateral        Family History   Problem Relation Age of Onset    Heart disease Mother         cardiac disorder    Heart disease Father         cardiac disorder    Heart disease Maternal Grandmother         cardiac disorder    Heart disease Maternal Grandfather         cardiac disorder    Heart disease Paternal Grandmother         cardiac disorder    Heart disease Paternal Grandfather         cardiac disorder       Social History     Occupational History    Not on file   Tobacco Use    Smoking status: Current Every Day Smoker     Packs/day: 1 00     Types: Cigarettes    Smokeless tobacco: Never Used   Vaping Use    Vaping Use: Never used   Substance and Sexual Activity    Alcohol use: No    Drug use: No    Sexual activity: Not on file       Current Outpatient Medications on File Prior to Visit   Medication Sig    acetaminophen-codeine (TYLENOL #4) 300-60 MG per tablet Take 1 tablet by mouth every 6 (six) hours as needed for moderate pain    albuterol (2 5 mg/3 mL) 0 083 % nebulizer solution inhale contents of 1 vial ( 3 milliliters ) in nebulizer by mouth every 4 hours    albuterol (PROVENTIL HFA,VENTOLIN HFA) 90 mcg/act inhaler Inhale 2 puffs every 6 (six) hours as needed for wheezing    ALPRAZolam (XANAX) 0 25 mg tablet take 1 tablet by mouth three times a day if needed for ANXIETY    aspirin (Aspirin 81) 81 mg EC tablet Take 1 tablet (81 mg total) by mouth daily    atorvastatin (LIPITOR) 80 mg tablet Take 1 tablet (80 mg total) by mouth every evening    clopidogrel (PLAVIX) 75 mg tablet take 1 tablet by mouth once daily    Diclofenac Sodium (VOLTAREN) 1 % Apply 4 g topically 4 (four) times a day    DULoxetine (CYMBALTA) 60 mg delayed release capsule take 1 capsule by mouth once daily    metoprolol succinate (TOPROL-XL) 25 mg 24 hr tablet take 1 tablet by mouth once daily    naloxone (NARCAN) 4 mg/0 1 mL nasal spray Administer 1 spray into a nostril  If no response after 2-3 minutes, give another dose in the other nostril using a new spray   oxyCODONE-acetaminophen (PERCOCET) 5-325 mg per tablet Take 1 tablet by mouth every 6 (six) hours as needed for moderate pain or severe painMax Daily Amount: 4 tablets    pantoprazole (PROTONIX) 40 mg tablet Take 1 tablet (40 mg total) by mouth daily    Trelegy Ellipta 100-62 5-25 MCG/INH inhaler INHALE 1 PUFF DAILY RINSE MOUTH AFTER USE    zolpidem (AMBIEN) 10 mg tablet Take 1 tablet (10 mg total) by mouth daily at bedtime as needed for sleep    diclofenac sodium (VOLTAREN) 1 % Apply 4 g topically 4 (four) times a day    fluconazole (DIFLUCAN) 100 mg tablet TAKE 2 TABS (200MG) ON DAY 1; THEN 1 TAB DAILY FOR THE NEXT 9 DAYS     No current facility-administered medications on file prior to visit         No Known Allergies      Physical Exam    /80   Pulse 64   Temp 97 7 °F (36 5 °C)   Resp 20   Ht 6' (1 829 m)   Wt 71 2 kg (157 lb)   BMI 21 29 kg/m²     Constitutional: normal, well developed, well nourished, alert, in no distress and non-toxic and no overt pain behavior  Eyes: anicteric  HEENT: grossly intact  Neck: supple, symmetric, trachea midline and no masses   Pulmonary:even and unlabored  Cardiovascular:No edema or pitting edema present  Skin:Normal without rashes or lesions and well hydrated  Psychiatric:Mood and affect appropriate  Neurologic:Cranial Nerves II-XII grossly intact Sensation grossly intact; no clonus negative perez's  Reflexes 2+ and brisk  SLR positive bilaterally  Musculoskeletal:normal gait  4/5 strength with ankle dorsiflexion bilaterally; otherwise 5/5 strength in all extremities with active range of motion movements bilaterally  Normal heel toe and tip toe walking  Pain with lumbar facet loading bilaterally and with lateral spine rotation  ttp over lumbar paraspinal muscles  Positive bradley's test, positive gaenslen's positive SIJ loading left sided  Imaging    MRI LUMBAR SPINE WITHOUT CONTRAST     INDICATION: Chronic low back pain refractory to conservative management      COMPARISON:  Lumbar spine radiographs dated 11/2/2018      TECHNIQUE:  Sagittal T1, sagittal T2, sagittal inversion recovery, axial T1 and axial T2, coronal T2        IMAGE QUALITY:  Diagnostic     FINDINGS:     ALIGNMENT:  Straightening of the lumbar lordosis without listhesis or scoliosis      MARROW SIGNAL:  Normal marrow signal is identified within the visualized bony structures  No discrete marrow lesion      DISTAL CORD AND CONUS:  Normal size and signal within the distal cord and conus    The conus terminates appropriately at the mid L1 level      PARASPINAL SOFT TISSUES:  Paraspinal soft tissues are unremarkable      SACRUM:  Limited evaluation is unremarkable      LOWER THORACIC DISC SPACES:  Normal disc height and signal   No disc herniation, canal stenosis or foraminal narrowing      LUMBAR DISC SPACES:     L1-L2:  No significant spondylosis      L2-L3:  No significant spondylosis      L3-L4: Tiny disc bulge  Mild facet arthropathy and ligamentum flavum thickening  Mild flattening of ventral thecal sac  Mild bilateral foraminal stenoses      L4-L5:  Small disc bulge  Facet arthropathy and ligamentum flavum thickening  Flattening of the ventral thecal sac  Mild left lateral recess stenosis  No significant foraminal stenoses      L5-S1:  Mild facet hypertrophy and ligamentum flavum thickening  Mild posterior foraminal stenoses      IMPRESSION:     1    Chronic straightening of the lumbar lordosis suggests muscle spasm      2   Mild, age-appropriate spondylosis without significant neural impingement

## 2021-06-16 NOTE — H&P (VIEW-ONLY)
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  Ligamentum flavum infolding at L3-4 and L4-5 as well  Patient describes pain in left S1 dermatomal distribution accompanied by weakness numbness and paresthesias on occasion  Most of his pain seems to be centered around his left sacroiliac joint  Positive Juan Miguel's, positive Gaenslen's, positive SI joint loading left-sided  Tenderness to palpation over left sacroiliac joint  He had an  Epidural steroid injection in March of 2019 that did not provide significant benefit  He has been to physical therapy many times in the past without significant benefit  Additionally he is on opioid therapy  Will proceed with multimodal pain therapy plan in treating this patient's pain  While obtaining more recent imaging to guide therapy  Plan  -Left sacroiliac joint injection  -MRI lumbar spine noncontrast  -may continue analgesics as previously ordered; cautioned regarding concomitant benzodiazepines with opioids  -physical therapy for right-sided lumbar radiculopathy; Core exercises additionally provided for physician directed home PT that the patient plans to participate with for 1 hour, twice a week for the next 6 weeks  There are risks associated with opioid medications, including dependence, addiction and tolerance  The patient understands and agrees to use these medications only as prescribed  Potential side effects of the medications include, but are not limited to, constipation, drowsiness, addiction, impaired judgment and risk of fatal overdose if not taken as prescribed  The patient was warned against driving while taking sedation medications  Sharing medications is a felony  At this point in time, the patient is showing no signs of addiction, abuse, diversion or suicidal ideation  [place] Prescription Drug Monitoring Program report was reviewed and was appropriate     Complete risks and benefits including bleeding, infection, tissue reaction, nerve injury and allergic reaction were discussed  The approach was demonstrated using models and literature was provided  Verbal and written consent was obtained  My impressions and treatment recommendations were discussed in detail with the patient who verbalized understanding and had no further questions  Discharge instructions were provided  I personally saw and examined the patient and I agree with the above discussed plan of care  No orders of the defined types were placed in this encounter  History of Present Illness    Kulwinder Schultz is a 77 y o  male with a pmhx of smoking, copd with 1ppd smoking history, GERD, CAD on plavix presenting with chronic lumbar radicular pain in the left S1 dermatomal distribution  Additionally describes left sided low back pain with achy, nagging, indolent, crampy stabbing and throbbing features  Debilitating weakness numbness and paresthesias accompany the pain  The patient rates the pain at a 8/10 accompanied by electric shock-like shooting features and crampy burning pain in the aforementioned dermatomal distributions  The pain is worse in the mornings as well as the end of the day; exertion such as walking for long periods of time seems to exacerbate the pain  The patient can hardly walk more than a few blocks without having debilitating pain  He tries to maintain an active lifestyle and finds that the current degree of pain seems to compromises his efforts  The pain significantly impacts independent activities of daily living and contributes to significant disability    He has attempted many courses of physical therapy without significant benefit in the past   He has taken percocet as well as tylenol #4 with limited relief of the pain as well  He has had left L4 and L5 epidural steroid injections in the past with 10% benefit  He denies any bowel or bladder dysfunction/incontinence    I have personally reviewed and/or updated the patient's past medical history, past surgical history, family history, social history, current medications, allergies, and vital signs today  Review of Systems   Constitutional: Positive for activity change  HENT: Negative  Eyes: Negative  Respiratory: Negative  Cardiovascular: Negative  Gastrointestinal: Negative  Endocrine: Negative  Genitourinary: Negative  Musculoskeletal: Positive for arthralgias, back pain, gait problem and myalgias  Skin: Negative  Allergic/Immunologic: Negative  Neurological: Positive for weakness and numbness  Hematological: Negative  Psychiatric/Behavioral: Negative  All other systems reviewed and are negative        Patient Active Problem List   Diagnosis    Encounter for hepatitis C screening test for low risk patient    Pain    Anxiety    Benign essential hypertension    Chronic obstructive pulmonary disease ([facility] [address] )    Screening for neurological condition    Chronic low back pain    Early satiety    GERD (gastroesophageal reflux disease)    Fatigue    Headache    Incisional hernia    Insomnia    Left knee pain    Neck pain    Osteoarthritis of both knees    Other chest pain    Other instability, right knee    Pain in joint of left shoulder    Pain of left hip    Weight loss, non-intentional    Lumbar spondylosis    Bulging of lumbar intervertebral disc    Low back pain    Displacement of lumbar intervertebral disc without myelopathy    Screening for AAA (abdominal aortic aneurysm)    Medicare annual wellness visit, initial    Lumbar radiculopathy    Uncomplicated opioid dependence ([address] )    Abnormal CT scan of lung    Upper respiratory tract infection    Acute respiratory failure with hypoxia (HCC)    ASCVD (arteriosclerotic cardiovascular disease)       Past Medical History:   Diagnosis Date    Chronic pain disorder     back    Hyperlipidemia     Hypertension     Hyperthyroidism     last assessed: 10/28/16    NSTEMI (non-ST elevated myocardial infarction) ([facility] [address] ) 4/13/2020       Past Surgical History:   Procedure Laterality Date    CARDIAC CATHETERIZATION  07/02/2020    EF 65% normal    COLONOSCOPY      EPIDURAL BLOCK INJECTION N/A 1/17/2019    Procedure: L5 S1 Lumbar Epidural Steroid Injection (38492);   Surgeon: Malachi Tidwell MD;  Location: [facility];  Service: Pain Management     EPIDURAL BLOCK INJECTION Left 3/6/2019    Procedure: L4- L5 and L5-S1 transforaminal epidural steroid injection;  Surgeon: Malachi Tidwell MD;  Location: [facility];  Service: Pain Management     FL GUIDED NEEDLE PLAC BX/ASP/INJ  3/6/2019    INGUINAL HERNIA REPAIR Bilateral        Family History   Problem Relation Age of Onset    Heart disease Mother         cardiac disorder    Heart disease Father         cardiac disorder    Heart disease Maternal Grandmother         cardiac disorder    Heart disease Maternal Grandfather         cardiac disorder    Heart disease Paternal Grandmother         cardiac disorder    Heart disease Paternal Grandfather         cardiac disorder       Social History     Occupational History    Not on file   Tobacco Use    Smoking status: Current Every Day Smoker     Packs/day: 1 00     Types: Cigarettes    Smokeless tobacco: Never Used   Vaping Use    Vaping Use: Never used   Substance and Sexual Activity    Alcohol use: No    Drug use: No    Sexual activity: Not on file       Current Outpatient Medications on File Prior to Visit   Medication Sig    acetaminophen-codeine (TYLENOL #4) 300-60 MG per tablet Take 1 tablet by mouth every 6 (six) hours as needed for moderate pain    albuterol (2 5 mg/3 mL) 0 083 % nebulizer solution inhale contents of 1 vial ( 3 milliliters ) in nebulizer by mouth every 4 hours    albuterol (PROVENTIL HFA,VENTOLIN HFA) 90 mcg/act inhaler Inhale 2 puffs every 6 (six) hours as needed for wheezing    ALPRAZolam (XANAX) 0 25 mg tablet take 1 tablet by mouth three times a day if needed for ANXIETY    aspirin (Aspirin 81) 81 mg EC tablet Take 1 tablet (81 mg total) by mouth daily    atorvastatin (LIPITOR) 80 mg tablet Take 1 tablet (80 mg total) by mouth every evening    clopidogrel (PLAVIX) 75 mg tablet take 1 tablet by mouth once daily    Diclofenac Sodium (VOLTAREN) 1 % Apply 4 g topically 4 (four) times a day    DULoxetine (CYMBALTA) 60 mg delayed release capsule take 1 capsule by mouth once daily    metoprolol succinate (TOPROL-XL) 25 mg 24 hr tablet take 1 tablet by mouth once daily    naloxone (NARCAN) 4 mg/0 1 mL nasal spray Administer 1 spray into a nostril  If no response after 2-3 minutes, give another dose in the other nostril using a new spray   oxyCODONE-acetaminophen (PERCOCET) 5-325 mg per tablet Take 1 tablet by mouth every 6 (six) hours as needed for moderate pain or severe painMax Daily Amount: 4 tablets    pantoprazole (PROTONIX) 40 mg tablet Take 1 tablet (40 mg total) by mouth daily    Trelegy Ellipta 100-62 5-25 MCG/INH inhaler INHALE 1 PUFF DAILY RINSE MOUTH AFTER USE    zolpidem (AMBIEN) 10 mg tablet Take 1 tablet (10 mg total) by mouth daily at bedtime as needed for sleep    diclofenac sodium (VOLTAREN) 1 % Apply 4 g topically 4 (four) times a day    fluconazole (DIFLUCAN) 100 mg tablet TAKE 2 TABS (200MG) ON DAY 1; THEN 1 TAB DAILY FOR THE NEXT 9 DAYS     No current facility-administered medications on file prior to visit         No Known Allergies      Physical Exam    /80   Pulse 64   Temp 97 7 °F (36 5 °C)   Resp 20   Ht 6' (1 829 m)   Wt 71 2 kg (157 lb)   BMI 21 29 kg/m²     Constitutional: normal, well developed, well nourished, alert, in no distress and non-toxic and no overt pain behavior  Eyes: anicteric  HEENT: grossly intact  Neck: supple, symmetric, trachea midline and no masses   Pulmonary:even and unlabored  Cardiovascular:No edema or pitting edema present  Skin:Normal without rashes or lesions and well hydrated  Psychiatric:Mood and affect appropriate  Neurologic:Cranial Nerves II-XII grossly intact Sensation grossly intact; no clonus negative perez's  Reflexes 2+ and brisk  SLR positive bilaterally  Musculoskeletal:normal gait  4/5 strength with ankle dorsiflexion bilaterally; otherwise 5/5 strength in all extremities with active range of motion movements bilaterally  Normal heel toe and tip toe walking  Pain with lumbar facet loading bilaterally and with lateral spine rotation  ttp over lumbar paraspinal muscles  Positive bradley's test, positive gaenslen's positive SIJ loading left sided  Imaging    MRI LUMBAR SPINE WITHOUT CONTRAST     INDICATION: Chronic low back pain refractory to conservative management      COMPARISON:  Lumbar spine radiographs dated 11/2/2018      TECHNIQUE:  Sagittal T1, sagittal T2, sagittal inversion recovery, axial T1 and axial T2, coronal T2        IMAGE QUALITY:  Diagnostic     FINDINGS:     ALIGNMENT:  Straightening of the lumbar lordosis without listhesis or scoliosis      MARROW SIGNAL:  Normal marrow signal is identified within the visualized bony structures  No discrete marrow lesion      DISTAL CORD AND CONUS:  Normal size and signal within the distal cord and conus    The conus terminates appropriately at the mid L1 level      PARASPINAL SOFT TISSUES:  Paraspinal soft tissues are unremarkable      SACRUM:  Limited evaluation is unremarkable      LOWER THORACIC DISC SPACES:  Normal disc height and signal   No disc herniation, canal stenosis or foraminal narrowing      LUMBAR DISC SPACES:     L1-L2:  No significant spondylosis      L2-L3:  No significant spondylosis      L3-L4: Tiny disc bulge  Mild facet arthropathy and ligamentum flavum thickening  Mild flattening of ventral thecal sac  Mild bilateral foraminal stenoses      L4-L5:  Small disc bulge  Facet arthropathy and ligamentum flavum thickening  Flattening of the ventral thecal sac  Mild left lateral recess stenosis  No significant foraminal stenoses      L5-S1:  Mild facet hypertrophy and ligamentum flavum thickening  Mild posterior foraminal stenoses      IMPRESSION:     1    Chronic straightening of the lumbar lordosis suggests muscle spasm      2   Mild, age-appropriate spondylosis without significant neural impingement

## 2021-06-22 DIAGNOSIS — R52 PAIN: ICD-10-CM

## 2021-06-22 RX ORDER — ACETAMINOPHEN AND CODEINE PHOSPHATE 60; 300 MG/1; MG/1
1 TABLET ORAL EVERY 6 HOURS PRN
Qty: 120 TABLET | Refills: 0 | Status: SHIPPED | OUTPATIENT
Start: 2021-06-22 | End: 2021-07-20 | Stop reason: SDUPTHER

## 2021-06-23 ENCOUNTER — HOSPITAL ENCOUNTER (OUTPATIENT)
Dept: MRI IMAGING | Facility: HOSPITAL | Age: 66
Discharge: HOME | End: 2021-06-23
Attending: ANESTHESIOLOGY
Payer: COMMERCIAL

## 2021-06-23 DIAGNOSIS — M54.16 LUMBAR RADICULOPATHY: ICD-10-CM

## 2021-06-23 PROCEDURE — 72148 MRI LUMBAR SPINE W/O DYE: CPT

## 2021-06-23 PROCEDURE — G1004 CDSM NDSC: HCPCS

## 2021-06-24 ENCOUNTER — APPOINTMENT (OUTPATIENT)
Dept: RADIOLOGY | Facility: HOSPITAL | Age: 66
End: 2021-06-24
Payer: COMMERCIAL

## 2021-06-24 ENCOUNTER — HOSPITAL ENCOUNTER (OUTPATIENT)
Facility: HOSPITAL | Age: 66
Setting detail: OUTPATIENT SURGERY
Discharge: HOME | End: 2021-06-24
Attending: ANESTHESIOLOGY | Admitting: ANESTHESIOLOGY
Payer: COMMERCIAL

## 2021-06-24 VITALS
OXYGEN SATURATION: 98 % | DIASTOLIC BLOOD PRESSURE: 77 MMHG | RESPIRATION RATE: 18 BRPM | SYSTOLIC BLOOD PRESSURE: 159 MMHG | HEIGHT: 72 IN | TEMPERATURE: 98.7 F | BODY MASS INDEX: 21.26 KG/M2 | WEIGHT: 157 LBS | HEART RATE: 88 BPM

## 2021-06-24 PROCEDURE — 27096 INJECT SACROILIAC JOINT: CPT | Performed by: ANESTHESIOLOGY

## 2021-06-24 RX ORDER — BUPIVACAINE HYDROCHLORIDE 2.5 MG/ML
INJECTION, SOLUTION EPIDURAL; INFILTRATION; INTRACAUDAL AS NEEDED
Status: DISCONTINUED | OUTPATIENT
Start: 2021-06-24 | End: 2021-06-24 | Stop reason: HOSPADM

## 2021-06-24 RX ORDER — METHYLPREDNISOLONE ACETATE 80 MG/ML
80 INJECTION, SUSPENSION INTRA-ARTICULAR; INTRALESIONAL; INTRAMUSCULAR; PARENTERAL; SOFT TISSUE ONCE
Status: COMPLETED | OUTPATIENT
Start: 2021-06-24 | End: 2021-06-24

## 2021-06-24 RX ORDER — LIDOCAINE HYDROCHLORIDE 10 MG/ML
5 INJECTION, SOLUTION EPIDURAL; INFILTRATION; INTRACAUDAL; PERINEURAL ONCE
Status: COMPLETED | OUTPATIENT
Start: 2021-06-24 | End: 2021-06-24

## 2021-06-24 RX ORDER — BUPIVACAINE HCL/PF 2.5 MG/ML
4 VIAL (ML) INJECTION ONCE
Status: DISCONTINUED | OUTPATIENT
Start: 2021-06-24 | End: 2021-06-25 | Stop reason: HOSPADM

## 2021-06-24 RX ORDER — ALPRAZOLAM 0.5 MG/1
0.5 TABLET ORAL ONCE
Status: COMPLETED | OUTPATIENT
Start: 2021-06-24 | End: 2021-06-24

## 2021-06-24 RX ADMIN — ALPRAZOLAM 0.5 MG: 0.5 TABLET ORAL at 13:09

## 2021-06-24 NOTE — PROCEDURES
Pre-procedure Diagnosis: Sacroiliitis/Sacroiliac joint dysfunction  Post-procedure Diagnosis: Sacroiliitis/Sacroiliac joint dysfunction  Operation Title(s):  1  [LEFT] Sacroiliac joint injection      2  Intraoperative fluoroscopy  Attending Surgeon:   Jovanni Bo MD  Anesthesia:   Local    Indications: The patient is a 77y o  year-old male with a diagnosis of sacroiliitis/Sacroiliac joint dysfunction  The patient's history and physical exam were reviewed  The risks, benefits and alternatives to the procedure were discussed, and all questions were answered to the patient's satisfaction  The patient agreed to proceed, and written informed consent was obtained  Procedure in Detail: The patient was brought into the procedure room and placed in the prone position on the fluoroscopy table  The low back and upper buttock were prepped with chloraprep and draped in a sterile manner  AP fluoroscopy was used to visualize the [LEFT] sacroiliac joint  The fluoroscopic beam was then obliqued until the anterior and posterior margins of the joint were aligned  The inferior margin of the joint was identified and marked  The skin and subcutaneous tissues in the area were anesthetized with 1% lidocaine  A 22-gauge, 3½-inch spinal needle was advanced toward the identified point under fluoroscopic guidance  Once the targeted point was reached and the joint space was entered, negative aspiration was confirmed, and 2 mL of Omnipaque 240 contrast was injected  The joint space was appropriately outlined  Then, after negative aspiration, a solution consisting of 5mL 0 25% bupivacaine and 1mL Depo-Medrol (80mg/ml) were easily injected  The needle was removed with a 1% lidocaine flush  The patient's back was cleaned and a bandage was placed over the sites of needle insertion  Disposition: The patient tolerated the procedure well and there were no apparent complications   Vital signs remained stable throughout the procedure  The patient was taken to the recovery area where written discharge instructions for the procedure were given      Estimated Blood Loss: None  Specimens Obtained: N/A

## 2021-06-24 NOTE — DISCHARGE INSTRUCTIONS
PLEASE SCHEDULE 2 WEEK FOLLOW UP BY CALLING THE [facility] AT [facility]: [phone]    Sacroiliac Joint Injection   WHAT YOU NEED TO KNOW:   A sacroiliac (SI) joint injection is done to diagnose or treat pain from sacroiliac joint syndrome  The pain caused by this syndrome may be felt in your lower back, buttocks, groin, and your thigh  DISCHARGE INSTRUCTIONS:   Seek care immediately if:   · You have a fever  · You have increased redness, or swelling around the injection site  · You have drainage from the injection site  · You are not able to walk or move your leg  Contact your healthcare provider if:   · Your pain does not get better within 5 days  · You have new symptoms  · You have questions or concerns about your condition or care  Self-care:   · Drink liquids as directed  Liquids will help flush the contrast material out of your body  Ask how much liquid to drink and which liquids are best for you  · Apply ice to your injection site  Ice helps decrease swelling and pain  Use an ice pack, or put crushed ice in a plastic bag  Cover it with a towel and place it on your low back for 15 to 20 minutes every hour or as directed  · Do not take a bath or get into a hot tub after your procedure  Take a shower instead  Soaking your puncture site in a bath or hot tub increases your risk for infection  · Limit physical activity that causes pain  Rest as needed  Ask your healthcare provider how long you should limit activity  · Keep a pain diary  Write down when your pain happens, how severe it is, and any other symptoms you have with your pain  A diary will help you keep track of your pain  It may also help your healthcare provider find out what is causing your pain  Follow up with your healthcare provider as directed: You may need more injections or other treatments  Bring your pain diary to your visits   Write down your questions so you remember to ask them during your visits  © Copyright [address] Information is for End User's use only and may not be sold, redistributed or otherwise used for commercial purposes  All illustrations and images included in CareNotes® are the copyrighted property of A D A M [employer] Inc  or Phyllis Marx  The above information is an  only  It is not intended as medical advice for individual conditions or treatments  Talk to your doctor, nurse or pharmacist before following any medical regimen to see if it is safe and effective for you

## 2021-06-24 NOTE — OP NOTE
OPERATIVE REPORT  PATIENT NAME: Irina Smith    :  1955  MRN: [id number]  Pt Location:  GI ROOM 01    SURGERY DATE: 2021    Surgeon(s) and Role:      Efe Winston MD - Primary    Preop Diagnosis:  Sacroiliac joint dysfunction of left side [M53 3]    Post-Op Diagnosis Codes:     * Sacroiliac joint dysfunction of left side [M53 3]    Procedure(s) (LRB):  BLOCK / INJECTION SACROILIAC (Left)    Specimen(s):  * No specimens in log *    Estimated Blood Loss:   Minimal    Drains:  * No LDAs found *    Anesthesia Type:   Local    Operative Indications:  Sacroiliac joint dysfunction of left side [M53 3]    Operative Findings:  Contrast spread into left sacroiliac joint under fluoroscopic guidance    Complications:   None    Procedure and Technique:  Please see detailed procedure note     I was present for the entire procedure    Patient Disposition:  PACU     SIGNATURE: Jasiel Jovel MD  DATE: 2021  TIME: 2:11 PM

## 2021-06-24 NOTE — INTERVAL H&P NOTE
H&P reviewed  After examining the patient I find no changes in the patients condition since the H&P had been written      Vitals:    06/24/21 1309   BP: (!) 165/102   Pulse: 97   Resp: 20   Temp: 97 8 °F (36 6 °C)   SpO2: 100%

## 2021-06-28 DIAGNOSIS — R52 PAIN: ICD-10-CM

## 2021-06-28 RX ORDER — OXYCODONE HYDROCHLORIDE AND ACETAMINOPHEN 5; 325 MG/1; MG/1
1 TABLET ORAL EVERY 6 HOURS PRN
Qty: 120 TABLET | Refills: 0 | Status: SHIPPED | OUTPATIENT
Start: 2021-06-28 | End: 2021-07-15

## 2021-06-28 RX ORDER — OXYCODONE HYDROCHLORIDE AND ACETAMINOPHEN 5; 325 MG/1; MG/1
1 TABLET ORAL EVERY 6 HOURS PRN
Qty: 120 TABLET | Refills: 0 | Status: CANCELLED | OUTPATIENT
Start: 2021-06-28

## 2021-07-01 ENCOUNTER — TELEPHONE (OUTPATIENT)
Dept: PAIN MEDICINE | Facility: CLINIC | Age: 66
End: 2021-07-01

## 2021-07-08 ENCOUNTER — RA CDI HCC (OUTPATIENT)
Dept: OTHER | Facility: HOSPITAL | Age: 66
End: 2021-07-08

## 2021-07-08 NOTE — PROGRESS NOTES
[facility]  coding opportunities          Chart reviewed, no opportunity found: CHART REVIEWED, NO OPPORTUNITY FOUND

## 2021-07-14 ENCOUNTER — OFFICE VISIT (OUTPATIENT)
Dept: PAIN MEDICINE | Facility: CLINIC | Age: 66
End: 2021-07-14
Payer: COMMERCIAL

## 2021-07-14 VITALS
TEMPERATURE: 97.7 F | SYSTOLIC BLOOD PRESSURE: 158 MMHG | WEIGHT: 138.8 LBS | HEIGHT: 72 IN | DIASTOLIC BLOOD PRESSURE: 82 MMHG | HEART RATE: 66 BPM | RESPIRATION RATE: 20 BRPM | BODY MASS INDEX: 18.8 KG/M2

## 2021-07-14 DIAGNOSIS — M84.48XG SACRAL INSUFFICIENCY FRACTURE WITH DELAYED HEALING, SUBSEQUENT ENCOUNTER: Primary | ICD-10-CM

## 2021-07-14 PROCEDURE — 3079F DIAST BP 80-89 MM HG: CPT | Performed by: ANESTHESIOLOGY

## 2021-07-14 PROCEDURE — 3077F SYST BP >= 140 MM HG: CPT | Performed by: ANESTHESIOLOGY

## 2021-07-14 PROCEDURE — 1160F RVW MEDS BY RX/DR IN RCRD: CPT | Performed by: ANESTHESIOLOGY

## 2021-07-14 PROCEDURE — 99214 OFFICE O/P EST MOD 30 MIN: CPT | Performed by: ANESTHESIOLOGY

## 2021-07-14 PROCEDURE — 4004F PT TOBACCO SCREEN RCVD TLK: CPT | Performed by: ANESTHESIOLOGY

## 2021-07-14 PROCEDURE — 3288F FALL RISK ASSESSMENT DOCD: CPT | Performed by: FAMILY MEDICINE

## 2021-07-14 RX ORDER — 0.9 % SODIUM CHLORIDE 0.9 %
1 VIAL (ML) INJECTION ONCE
Status: CANCELLED | OUTPATIENT
Start: 2021-07-14 | End: 2021-07-14

## 2021-07-14 RX ORDER — LIDOCAINE HYDROCHLORIDE 10 MG/ML
10 INJECTION, SOLUTION EPIDURAL; INFILTRATION; INTRACAUDAL; PERINEURAL ONCE
Status: CANCELLED | OUTPATIENT
Start: 2021-07-14 | End: 2021-07-14

## 2021-07-14 RX ORDER — PAPAVERINE HCL 150 MG
40 CAPSULE, EXTENDED RELEASE ORAL ONCE
Status: CANCELLED | OUTPATIENT
Start: 2021-07-14 | End: 2021-07-14

## 2021-07-14 NOTE — PATIENT INSTRUCTIONS
Core Strengthening Exercises   WHAT YOU NEED TO KNOW:   Your core includes the muscles of your lower back, hip, pelvis, and abdomen  Core strengthening exercises help heal and strengthen these muscles  This helps prevent another injury, and keeps your pelvis, spine, and hips in the correct position  DISCHARGE INSTRUCTIONS:   Contact your healthcare provider if:   · You have sharp or worsening pain during exercise or at rest     · You have questions or concerns about your shoulder exercises  Safety tips:  Talk to your healthcare provider before you start an exercise program  A physical therapist can teach you how to do core strengthening exercises safely  · Do the exercises on a mat or firm surface  A firm surface will support your spine and prevent low back pain  Do not do these exercises on a bed  · Move slowly and smoothly  Avoid fast or jerky motions  · Stop if you feel pain  Core exercises should not be painful  Stop if you feel pain  · Breathe normally during core exercises  Do not hold your breath  This may cause an increase in blood pressure and prevent muscle strengthening  Your healthcare provider will tell you when to inhale and exhale during the exercise  · Begin all of your exercises with abdominal bracing  Abdominal bracing helps warm up your core muscles  You can also practice abdominal bracing throughout the day  Lie on your back with your knees bent and feet flat on the floor  Place your arms in a relaxed position beside your body  Tighten your abdominal muscles  Pull your belly button in and up toward your spine  Hold for 5 seconds  Relax your muscles  Repeat 10 times  Core strengthening exercises: Your healthcare provider will tell you how often to do these exercises  The provider will also tell you how many repetitions of each exercise you should do  Hold each exercise for 5 seconds or as directed  As you get stronger, increase your hold to 10 to 15 seconds   You can do some of these exercises on a stability ball, or with a weight  Ask your healthcare provider how to use a stability ball or weight for these exercises:  · Bridging:  Lie on your back with your knees bent and feet flat on the floor  Rest your arms at your side  Tighten your buttocks, and then lift your hips 1 inch off the floor  Hold for 5 seconds  When you can do this exercise without pain for 10 seconds, increase the distance you lift your hips  A good goal is to be able to lift your hips so that your shoulders, hips, and knees are in a straight line  · Dead bug:  Lie on your back with your knees bent and feet flat on the floor  Place your arms in a relaxed position beside your body  Begin with abdominal bracing  Next, raise one leg, keeping your knee bent  Hold for 5 seconds  Repeat with the other leg  When you can do this exercise without pain for 10 to 15 seconds, you may raise one straight leg and hold  Repeat with the other leg  · Quadruped:  Place your hands and knees on the floor  Keep your wrists directly below your shoulders and your knees directly below your hips  Pull your belly button in toward your spine  Do not flatten or arch your back  Tighten your abdominal muscles below your belly button  Hold for 5 seconds  When you can do this exercise without pain for 10 to 15 seconds, you may extend one arm and hold  Repeat on the other side  · Side bridge exercises:      ? Standing side bridge:  Stand next to a wall and extend one arm toward the wall  Place your palm flat on the wall with your fingers pointing upward  Begin with abdominal bracing  Next, without moving your feet, slowly bend your arm to 90 degrees  Hold for 5 seconds  Repeat on the other side  When you can do this exercise without pain for 10 to 15 seconds, you may do the bent leg side bridge on the floor  ? Bent leg side bridge:  Lie on one side with your legs, hips, and shoulders in a straight line   Prop yourself up onto your forearm so your elbow is directly below your shoulder  Bend your knees back to 90 degrees  Begin with abdominal bracing  Next, lift your hips and balance yourself on your forearm and knees  Hold for 5 seconds  Repeat on the other side  When you can do this exercise without pain for 10 to 15 seconds, you may do the straight leg side bridge on the floor  ? Straight leg side bridge:  Lie on one side with your legs, hips, and shoulders in a straight line  Prop yourself up onto your forearm so your elbow is directly below your shoulder  Begin with abdominal bracing  Lift your hips off the floor and balance yourself on your forearm and the outside of your flexed foot  Do not let your ankle bend sideways  Hold for 5 seconds  Repeat on the other side  When you can do this exercise without pain for 10 to 15 seconds, ask your healthcare provider for more advanced exercises  · Superman:  Lie on your stomach  Extend your arms forward on the floor  Tighten your abdominal muscles and lift your right hand and left leg off the floor  Hold this position  Slowly return to the starting position  Tighten your abdominal muscles and lift your left hand and right leg off the floor  Hold this position  Slowly return to the starting position  · Clam:  Lie on your side with your knees bent  Put your bottom arm under your head to keep your neck in line  Put your top hand on your hip to keep your pelvis from moving  Put your heels together, and keep them together during this exercise  Slowly raise your top knee toward the ceiling  Then lower your leg so your knees are together  Repeat this exercise 10 times  Then switch sides and do the exercise 10 times with the other leg  · Curl up:  Lie on your back with your knees bent and feet flat on the floor  Place your hands, palms down, underneath your lower back   Next, with your elbows on the floor, lift your shoulders and chest 2 to 3 inches off the floor  Keep your head in line with your shoulders  Hold this position  Slowly return to the starting position  · Straight leg raises:  Lie on your back with one leg straight  Bend the other knee and place your foot flat on the floor  Tighten your abdominal muscles  Keep your leg straight and slowly lift it straight up 6 to 12 inches off the floor  Hold this position  Lower your leg slowly  Do as many repetitions as directed on this side  Repeat with the other leg  · Plank:  Lie on your stomach  Bend your elbows and place your forearms flat on the floor  Lift your chest, stomach, and knees off the floor  Make sure your elbows are below your shoulders  Your body should be in a straight line  Do not let your hips or lower back sink to the ground  Squeeze your abdominal muscles together and hold for 15 seconds  To make this exercise harder, hold for 30 seconds or lift 1 leg at a time  · Bicycles:  Lie on your back  Bend both knees and bring them toward your chest  Your calves should be parallel to the floor  Place the palms of your hands on the back of your head  Straighten your right leg and keep it lifted 2 inches off the floor  Raise your head and shoulders off the floor and twist towards your left  Keep your head and shoulders lifted  Bend your right knee while you straighten your left leg  Keep your left leg 2 inches off the floor  Twist your head and chest towards the left leg  Continue to straighten 1 leg at a time and twist        Follow up with your healthcare provider as directed:  Write down your questions so you remember to ask them during your visits  © Copyright [address] Information is for End User's use only and may not be sold, redistributed or otherwise used for commercial purposes  All illustrations and images included in CareNotes® are the copyrighted property of [employer]  or [address] [place], [place] [address]jemal Jackson   The above information is an  only   It is not intended as medical advice for individual conditions or treatments  Talk to your doctor, nurse or pharmacist before following any medical regimen to see if it is safe and effective for you

## 2021-07-14 NOTE — H&P (VIEW-ONLY)
Assessment  1  Sacral insufficiency fracture with delayed healing, subsequent encounter  -     CT pelvis wo contrast; Future; Expected date: 07/14/2021  -     Case request operating room: BLOCK / [address] left S1 TFESI; Standing  -     Case request operating room: BLOCK / INJECTION EPIDURAL STEROID LUMBAR left S1 TFESI    No significant benefit after sacroiliac joint injection  She has pelvic insufficiency fractures noted recent imaging  CT bony pelvis is recommended for further characterization as a likely be impinging left S1  Will plan for transforaminal epidural steroid injection at left S1 after patient obtains CT scan    Mild spondyloarthropathy that is noncompressive it lumbar spine  Ligamentum flavum infolding at L3-4 and L4-5 as well  Patient describes pain in left S1 dermatomal distribution accompanied by weakness numbness and paresthesias on occasion  Most of his pain seems to be centered around his left sacroiliac joint  Positive Juan Miguel's, positive Gaenslen's, positive SI joint loading left-sided  Tenderness to palpation over left sacroiliac joint  He had an  Epidural steroid injection in March of 2019 that did not provide significant benefit  He has been to physical therapy many times in the past without significant benefit  Additionally he is on opioid therapy  Will proceed with multimodal pain therapy plan in treating this patient's pain  While obtaining more recent imaging to guide therapy  Plan  -Left s1 TFESI  -CT bony pelvis noncontrast  -may continue analgesics as previously ordered; cautioned regarding concomitant benzodiazepines with opioids  -physical therapy for left-sided lumbar radiculopathy; Core exercises additionally provided for physician directed home PT that the patient plans to participate with for 1 hour, twice a week for the next 6 weeks  There are risks associated with opioid medications, including dependence, addiction and tolerance  The patient understands and agrees to use these medications only as prescribed  Potential side effects of the medications include, but are not limited to, constipation, drowsiness, addiction, impaired judgment and risk of fatal overdose if not taken as prescribed  The patient was warned against driving while taking sedation medications  Sharing medications is a felony  At this point in time, the patient is showing no signs of addiction, abuse, diversion or suicidal ideation  [place] Prescription Drug Monitoring Program report was reviewed and was appropriate     Complete risks and benefits including bleeding, infection, tissue reaction, nerve injury and allergic reaction were discussed  The approach was demonstrated using models and literature was provided  Verbal and written consent was obtained  My impressions and treatment recommendations were discussed in detail with the patient who verbalized understanding and had no further questions  Discharge instructions were provided  I personally saw and examined the patient and I agree with the above discussed plan of care  No orders of the defined types were placed in this encounter  History of Present Illness    No significant benefit after sacroiliac joint injection  She has pelvic insufficiency fractures noted recent imaging  CT bony pelvis is recommended for further characterization as a likely be impinging left S1  Will plan for transforaminal epidural steroid injection at left S1 after patient obtains CT scan    Charley Holstein is a 77 y o  male with a pmhx of smoking, copd with 1ppd smoking history, GERD, CAD on plavix presenting with chronic lumbar radicular pain in the left S1 dermatomal distribution  Additionally describes left sided low back pain with achy, nagging, indolent, crampy stabbing and throbbing features  Debilitating weakness numbness and paresthesias accompany the pain   The patient rates the pain at a 8/10 accompanied by electric shock-like shooting features and crampy burning pain in the aforementioned dermatomal distributions  The pain is worse in the mornings as well as the end of the day; exertion such as walking for long periods of time seems to exacerbate the pain  The patient can hardly walk more than a few blocks without having debilitating pain  He tries to maintain an active lifestyle and finds that the current degree of pain seems to compromises his efforts  The pain significantly impacts independent activities of daily living and contributes to significant disability  He has attempted many courses of physical therapy without significant benefit in the past   He has taken percocet as well as tylenol #4 with limited relief of the pain as well  He has had left L4 and L5 epidural steroid injections in the past with 10% benefit  He denies any bowel or bladder dysfunction/incontinence    I have personally reviewed and/or updated the patient's past medical history, past surgical history, family history, social history, current medications, allergies, and vital signs today  Review of Systems   Constitutional: Positive for activity change  HENT: Negative  Eyes: Negative  Respiratory: Negative  Cardiovascular: Negative  Gastrointestinal: Negative  Endocrine: Negative  Genitourinary: Negative  Musculoskeletal: Positive for arthralgias, back pain, gait problem and myalgias  Skin: Negative  Allergic/Immunologic: Negative  Neurological: Positive for weakness and numbness  Hematological: Negative  Psychiatric/Behavioral: Negative  All other systems reviewed and are negative        Patient Active Problem List   Diagnosis    Encounter for hepatitis C screening test for low risk patient    Pain    Anxiety    Benign essential hypertension    Chronic obstructive pulmonary disease ([facility] [address] )    Screening for neurological condition    Chronic low back pain    Early satiety    GERD (gastroesophageal reflux disease)    Fatigue    Headache    Incisional hernia    Insomnia    Left knee pain    Neck pain    Osteoarthritis of both knees    Other chest pain    Other instability, right knee    Pain in joint of left shoulder    Pain of left hip    Weight loss, non-intentional    Lumbar spondylosis    Bulging of lumbar intervertebral disc    Low back pain    Displacement of lumbar intervertebral disc without myelopathy    Screening for AAA (abdominal aortic aneurysm)    Medicare annual wellness visit, initial    Lumbar radiculopathy    Uncomplicated opioid dependence ([facility] [address] )    Abnormal CT scan of lung    Upper respiratory tract infection    Acute respiratory failure with hypoxia ([facility] [address] )    ASCVD (arteriosclerotic cardiovascular disease)       Past Medical History:   Diagnosis Date    Chronic pain disorder     back    Hyperlipidemia     Hypertension     Hyperthyroidism     last assessed: 10/28/16    NSTEMI (non-ST elevated myocardial infarction) ([facility] [address] ) 4/13/2020       Past Surgical History:   Procedure Laterality Date    CARDIAC CATHETERIZATION  07/02/2020    EF 65% normal    COLONOSCOPY      EPIDURAL BLOCK INJECTION N/A 1/17/2019    Procedure: L5 S1 Lumbar Epidural Steroid Injection (12251);   Surgeon: Ailyn Milan MD;  Location: [facility];  Service: Pain Management     EPIDURAL BLOCK INJECTION Left 3/6/2019    Procedure: L4- L5 and L5-S1 transforaminal epidural steroid injection;  Surgeon: Ailyn Milan MD;  Location: [facility];  Service: Pain Management     FL GUIDED NEEDLE PLAC BX/ASP/INJ  3/6/2019    INGUINAL HERNIA REPAIR Bilateral     OR INJECTION,SACROILIAC JOINT Left 6/24/2021    Procedure: BLOCK / INJECTION SACROILIAC;  Surgeon: Renetta Stein MD;  Location:  [facility];  Service: Pain Management        Family History   Problem Relation Age of Onset    Heart disease Mother     Heart disease Father         cardiac disorder    Heart disease Maternal Grandmother         cardiac disorder    Heart disease Maternal Grandfather         cardiac disorder    Heart disease Paternal Grandmother         cardiac disorder    Heart disease Paternal Grandfather         cardiac disorder       Social History     Occupational History    Not on file   Tobacco Use    Smoking status: Current Every Day Smoker     Packs/day: 1 00     Types: Cigarettes    Smokeless tobacco: Never Used   Vaping Use    Vaping Use: Never used   Substance and Sexual Activity    Alcohol use: No    Drug use: No    Sexual activity: Not on file       Current Outpatient Medications on File Prior to Visit   Medication Sig    acetaminophen-codeine (TYLENOL #4) 300-60 MG per tablet Take 1 tablet by mouth every 6 (six) hours as needed for moderate pain    albuterol (2 5 mg/3 mL) 0 083 % nebulizer solution inhale contents of 1 vial ( 3 milliliters ) in nebulizer by mouth every 4 hours    albuterol (PROVENTIL HFA,VENTOLIN HFA) 90 mcg/act inhaler Inhale 2 puffs every 6 (six) hours as needed for wheezing    ALPRAZolam (XANAX) 0 25 mg tablet take 1 tablet by mouth three times a day if needed for ANXIETY    aspirin (Aspirin 81) 81 mg EC tablet Take 1 tablet (81 mg total) by mouth daily    atorvastatin (LIPITOR) 80 mg tablet Take 1 tablet (80 mg total) by mouth every evening    clopidogrel (PLAVIX) 75 mg tablet take 1 tablet by mouth once daily    Diclofenac Sodium (VOLTAREN) 1 % Apply 4 g topically 4 (four) times a day    DULoxetine (CYMBALTA) 60 mg delayed release capsule take 1 capsule by mouth once daily    metoprolol succinate (TOPROL-XL) 25 mg 24 hr tablet take 1 tablet by mouth once daily    naloxone (NARCAN) 4 mg/0 1 mL nasal spray Administer 1 spray into a nostril  If no response after 2-3 minutes, give another dose in the other nostril using a new spray      oxyCODONE-acetaminophen (PERCOCET) 5-325 mg per tablet Take 1 tablet by mouth every 6 (six) hours as needed for moderate pain or severe painMax Daily Amount: 4 tablets    pantoprazole (PROTONIX) 40 mg tablet Take 1 tablet (40 mg total) by mouth daily    Trelegy Ellipta 100-62 5-25 MCG/INH inhaler INHALE 1 PUFF DAILY RINSE MOUTH AFTER USE    zolpidem (AMBIEN) 10 mg tablet Take 1 tablet (10 mg total) by mouth daily at bedtime as needed for sleep     No current facility-administered medications on file prior to visit  No Known Allergies      Physical Exam    /82   Pulse 66   Temp 97 7 °F (36 5 °C)   Resp 20   Ht 6' (1 829 m)   Wt 63 kg (138 lb 12 8 oz)   BMI 18 82 kg/m²     Constitutional: normal, well developed, well nourished, alert, in no distress and non-toxic and no overt pain behavior  Eyes: anicteric  HEENT: grossly intact  Neck: supple, symmetric, trachea midline and no masses   Pulmonary:even and unlabored  Cardiovascular:No edema or pitting edema present  Skin:Normal without rashes or lesions and well hydrated  Psychiatric:Mood and affect appropriate  Neurologic:Cranial Nerves II-XII grossly intact Sensation grossly intact; no clonus negative perez's  Reflexes 2+ and brisk  SLR positive bilaterally  Musculoskeletal:normal gait  4/5 strength with ankle dorsiflexion bilaterally; otherwise 5/5 strength in all extremities with active range of motion movements bilaterally  Normal heel toe and tip toe walking  Pain with lumbar facet loading bilaterally and with lateral spine rotation  ttp over lumbar paraspinal muscles  Positive bradley's test, positive gaenslen's positive SIJ loading left sided      Imaging    MRI LUMBAR SPINE WITHOUT CONTRAST     INDICATION: Chronic low back pain refractory to conservative management      COMPARISON:  Lumbar spine radiographs dated 11/2/2018      TECHNIQUE:  Sagittal T1, sagittal T2, sagittal inversion recovery, axial T1 and axial T2, coronal T2        IMAGE QUALITY:  Diagnostic     FINDINGS:     ALIGNMENT:  Straightening of the lumbar lordosis without listhesis or scoliosis      MARROW SIGNAL:  Normal marrow signal is identified within the visualized bony structures  No discrete marrow lesion      DISTAL CORD AND CONUS:  Normal size and signal within the distal cord and conus  The conus terminates appropriately at the mid L1 level      PARASPINAL SOFT TISSUES:  Paraspinal soft tissues are unremarkable      SACRUM:  Limited evaluation is unremarkable      LOWER THORACIC DISC SPACES:  Normal disc height and signal   No disc herniation, canal stenosis or foraminal narrowing      LUMBAR DISC SPACES:     L1-L2:  No significant spondylosis      L2-L3:  No significant spondylosis      L3-L4:  Tiny disc bulge  Mild facet arthropathy and ligamentum flavum thickening  Mild flattening of ventral thecal sac  Mild bilateral foraminal stenoses      L4-L5:  Small disc bulge  Facet arthropathy and ligamentum flavum thickening  Flattening of the ventral thecal sac  Mild left lateral recess stenosis  No significant foraminal stenoses      L5-S1:  Mild facet hypertrophy and ligamentum flavum thickening  Mild posterior foraminal stenoses      IMPRESSION:     1    Chronic straightening of the lumbar lordosis suggests muscle spasm      2   Mild, age-appropriate spondylosis without significant neural impingement

## 2021-07-14 NOTE — PROGRESS NOTES
Assessment  1  Sacral insufficiency fracture with delayed healing, subsequent encounter  -     CT pelvis wo contrast; Future; Expected date: 07/14/2021  -     Case request operating room: BLOCK / [address] left S1 TFESI; Standing  -     Case request operating room: BLOCK / INJECTION EPIDURAL STEROID LUMBAR left S1 TFESI    No significant benefit after sacroiliac joint injection  She has pelvic insufficiency fractures noted recent imaging  CT bony pelvis is recommended for further characterization as a likely be impinging left S1  Will plan for transforaminal epidural steroid injection at left S1 after patient obtains CT scan    Mild spondyloarthropathy that is noncompressive it lumbar spine  Ligamentum flavum infolding at L3-4 and L4-5 as well  Patient describes pain in left S1 dermatomal distribution accompanied by weakness numbness and paresthesias on occasion  Most of his pain seems to be centered around his left sacroiliac joint  Positive Juan Miguel's, positive Gaenslen's, positive SI joint loading left-sided  Tenderness to palpation over left sacroiliac joint  He had an  Epidural steroid injection in March of 2019 that did not provide significant benefit  He has been to physical therapy many times in the past without significant benefit  Additionally he is on opioid therapy  Will proceed with multimodal pain therapy plan in treating this patient's pain  While obtaining more recent imaging to guide therapy  Plan  -Left s1 TFESI  -CT bony pelvis noncontrast  -may continue analgesics as previously ordered; cautioned regarding concomitant benzodiazepines with opioids  -physical therapy for left-sided lumbar radiculopathy; Core exercises additionally provided for physician directed home PT that the patient plans to participate with for 1 hour, twice a week for the next 6 weeks  There are risks associated with opioid medications, including dependence, addiction and tolerance  The patient understands and agrees to use these medications only as prescribed  Potential side effects of the medications include, but are not limited to, constipation, drowsiness, addiction, impaired judgment and risk of fatal overdose if not taken as prescribed  The patient was warned against driving while taking sedation medications  Sharing medications is a felony  At this point in time, the patient is showing no signs of addiction, abuse, diversion or suicidal ideation  [place] Prescription Drug Monitoring Program report was reviewed and was appropriate     Complete risks and benefits including bleeding, infection, tissue reaction, nerve injury and allergic reaction were discussed  The approach was demonstrated using models and literature was provided  Verbal and written consent was obtained  My impressions and treatment recommendations were discussed in detail with the patient who verbalized understanding and had no further questions  Discharge instructions were provided  I personally saw and examined the patient and I agree with the above discussed plan of care  No orders of the defined types were placed in this encounter  History of Present Illness    No significant benefit after sacroiliac joint injection  She has pelvic insufficiency fractures noted recent imaging  CT bony pelvis is recommended for further characterization as a likely be impinging left S1  Will plan for transforaminal epidural steroid injection at left S1 after patient obtains CT scan    Fidel Mart is a 77 y o  male with a pmhx of smoking, copd with 1ppd smoking history, GERD, CAD on plavix presenting with chronic lumbar radicular pain in the left S1 dermatomal distribution  Additionally describes left sided low back pain with achy, nagging, indolent, crampy stabbing and throbbing features  Debilitating weakness numbness and paresthesias accompany the pain   The patient rates the pain at a 8/10 accompanied by electric shock-like shooting features and crampy burning pain in the aforementioned dermatomal distributions  The pain is worse in the mornings as well as the end of the day; exertion such as walking for long periods of time seems to exacerbate the pain  The patient can hardly walk more than a few blocks without having debilitating pain  He tries to maintain an active lifestyle and finds that the current degree of pain seems to compromises his efforts  The pain significantly impacts independent activities of daily living and contributes to significant disability  He has attempted many courses of physical therapy without significant benefit in the past   He has taken percocet as well as tylenol #4 with limited relief of the pain as well  He has had left L4 and L5 epidural steroid injections in the past with 10% benefit  He denies any bowel or bladder dysfunction/incontinence    I have personally reviewed and/or updated the patient's past medical history, past surgical history, family history, social history, current medications, allergies, and vital signs today  Review of Systems   Constitutional: Positive for activity change  HENT: Negative  Eyes: Negative  Respiratory: Negative  Cardiovascular: Negative  Gastrointestinal: Negative  Endocrine: Negative  Genitourinary: Negative  Musculoskeletal: Positive for arthralgias, back pain, gait problem and myalgias  Skin: Negative  Allergic/Immunologic: Negative  Neurological: Positive for weakness and numbness  Hematological: Negative  Psychiatric/Behavioral: Negative  All other systems reviewed and are negative        Patient Active Problem List   Diagnosis    Encounter for hepatitis C screening test for low risk patient    Pain    Anxiety    Benign essential hypertension    Chronic obstructive pulmonary disease ([facility] [address] )    Screening for neurological condition    Chronic low back pain    Early satiety    GERD (gastroesophageal reflux disease)    Fatigue    Headache    Incisional hernia    Insomnia    Left knee pain    Neck pain    Osteoarthritis of both knees    Other chest pain    Other instability, right knee    Pain in joint of left shoulder    Pain of left hip    Weight loss, non-intentional    Lumbar spondylosis    Bulging of lumbar intervertebral disc    Low back pain    Displacement of lumbar intervertebral disc without myelopathy    Screening for AAA (abdominal aortic aneurysm)    Medicare annual wellness visit, initial    Lumbar radiculopathy    Uncomplicated opioid dependence ([facility] [address] )    Abnormal CT scan of lung    Upper respiratory tract infection    Acute respiratory failure with hypoxia ([facility] [address] )    ASCVD (arteriosclerotic cardiovascular disease)       Past Medical History:   Diagnosis Date    Chronic pain disorder     back    Hyperlipidemia     Hypertension     Hyperthyroidism     last assessed: 10/28/16    NSTEMI (non-ST elevated myocardial infarction) ([facility] [address] ) 4/13/2020       Past Surgical History:   Procedure Laterality Date    CARDIAC CATHETERIZATION  07/02/2020    EF 65% normal    COLONOSCOPY      EPIDURAL BLOCK INJECTION N/A 1/17/2019    Procedure: L5 S1 Lumbar Epidural Steroid Injection (11934);   Surgeon: Stacy Covarrubias MD;  [facility];  Service: Pain Management     EPIDURAL BLOCK INJECTION Left 3/6/2019    Procedure: L4- L5 and L5-S1 transforaminal epidural steroid injection;  Surgeon: Stacy Covarrubias MD;  [facility];  Service: Pain Management     FL GUIDED NEEDLE PLAC BX/ASP/INJ  3/6/2019    INGUINAL HERNIA REPAIR Bilateral     AK INJECTION,SACROILIAC JOINT Left 6/24/2021    Procedure: BLOCK / INJECTION SACROILIAC;  Surgeon: Anthony Almeida MD;  Location:  [facility];  Service: Pain Management        Family History   Problem Relation Age of Onset    Heart disease Mother     Heart disease Father         cardiac disorder    Heart disease Maternal Grandmother         cardiac disorder    Heart disease Maternal Grandfather         cardiac disorder    Heart disease Paternal Grandmother         cardiac disorder    Heart disease Paternal Grandfather         cardiac disorder       Social History     Occupational History    Not on file   Tobacco Use    Smoking status: Current Every Day Smoker     Packs/day: 1 00     Types: Cigarettes    Smokeless tobacco: Never Used   Vaping Use    Vaping Use: Never used   Substance and Sexual Activity    Alcohol use: No    Drug use: No    Sexual activity: Not on file       Current Outpatient Medications on File Prior to Visit   Medication Sig    acetaminophen-codeine (TYLENOL #4) 300-60 MG per tablet Take 1 tablet by mouth every 6 (six) hours as needed for moderate pain    albuterol (2 5 mg/3 mL) 0 083 % nebulizer solution inhale contents of 1 vial ( 3 milliliters ) in nebulizer by mouth every 4 hours    albuterol (PROVENTIL HFA,VENTOLIN HFA) 90 mcg/act inhaler Inhale 2 puffs every 6 (six) hours as needed for wheezing    ALPRAZolam (XANAX) 0 25 mg tablet take 1 tablet by mouth three times a day if needed for ANXIETY    aspirin (Aspirin 81) 81 mg EC tablet Take 1 tablet (81 mg total) by mouth daily    atorvastatin (LIPITOR) 80 mg tablet Take 1 tablet (80 mg total) by mouth every evening    clopidogrel (PLAVIX) 75 mg tablet take 1 tablet by mouth once daily    Diclofenac Sodium (VOLTAREN) 1 % Apply 4 g topically 4 (four) times a day    DULoxetine (CYMBALTA) 60 mg delayed release capsule take 1 capsule by mouth once daily    metoprolol succinate (TOPROL-XL) 25 mg 24 hr tablet take 1 tablet by mouth once daily    naloxone (NARCAN) 4 mg/0 1 mL nasal spray Administer 1 spray into a nostril  If no response after 2-3 minutes, give another dose in the other nostril using a new spray      oxyCODONE-acetaminophen (PERCOCET) 5-325 mg per tablet Take 1 tablet by mouth every 6 (six) hours as needed for moderate pain or severe painMax Daily Amount: 4 tablets    pantoprazole (PROTONIX) 40 mg tablet Take 1 tablet (40 mg total) by mouth daily    Trelegy Ellipta 100-62 5-25 MCG/INH inhaler INHALE 1 PUFF DAILY RINSE MOUTH AFTER USE    zolpidem (AMBIEN) 10 mg tablet Take 1 tablet (10 mg total) by mouth daily at bedtime as needed for sleep     No current facility-administered medications on file prior to visit  No Known Allergies      Physical Exam    /82   Pulse 66   Temp 97 7 °F (36 5 °C)   Resp 20   Ht 6' (1 829 m)   Wt 63 kg (138 lb 12 8 oz)   BMI 18 82 kg/m²     Constitutional: normal, well developed, well nourished, alert, in no distress and non-toxic and no overt pain behavior  Eyes: anicteric  HEENT: grossly intact  Neck: supple, symmetric, trachea midline and no masses   Pulmonary:even and unlabored  Cardiovascular:No edema or pitting edema present  Skin:Normal without rashes or lesions and well hydrated  Psychiatric:Mood and affect appropriate  Neurologic:Cranial Nerves II-XII grossly intact Sensation grossly intact; no clonus negative perez's  Reflexes 2+ and brisk  SLR positive bilaterally  Musculoskeletal:normal gait  4/5 strength with ankle dorsiflexion bilaterally; otherwise 5/5 strength in all extremities with active range of motion movements bilaterally  Normal heel toe and tip toe walking  Pain with lumbar facet loading bilaterally and with lateral spine rotation  ttp over lumbar paraspinal muscles  Positive bradley's test, positive gaenslen's positive SIJ loading left sided      Imaging    MRI LUMBAR SPINE WITHOUT CONTRAST     INDICATION: Chronic low back pain refractory to conservative management      COMPARISON:  Lumbar spine radiographs dated 11/2/2018      TECHNIQUE:  Sagittal T1, sagittal T2, sagittal inversion recovery, axial T1 and axial T2, coronal T2        IMAGE QUALITY:  Diagnostic     FINDINGS:     ALIGNMENT:  Straightening of the lumbar lordosis without listhesis or scoliosis      MARROW SIGNAL:  Normal marrow signal is identified within the visualized bony structures  No discrete marrow lesion      DISTAL CORD AND CONUS:  Normal size and signal within the distal cord and conus  The conus terminates appropriately at the mid L1 level      PARASPINAL SOFT TISSUES:  Paraspinal soft tissues are unremarkable      SACRUM:  Limited evaluation is unremarkable      LOWER THORACIC DISC SPACES:  Normal disc height and signal   No disc herniation, canal stenosis or foraminal narrowing      LUMBAR DISC SPACES:     L1-L2:  No significant spondylosis      L2-L3:  No significant spondylosis      L3-L4:  Tiny disc bulge  Mild facet arthropathy and ligamentum flavum thickening  Mild flattening of ventral thecal sac  Mild bilateral foraminal stenoses      L4-L5:  Small disc bulge  Facet arthropathy and ligamentum flavum thickening  Flattening of the ventral thecal sac  Mild left lateral recess stenosis  No significant foraminal stenoses      L5-S1:  Mild facet hypertrophy and ligamentum flavum thickening  Mild posterior foraminal stenoses      IMPRESSION:     1    Chronic straightening of the lumbar lordosis suggests muscle spasm      2   Mild, age-appropriate spondylosis without significant neural impingement

## 2021-07-14 NOTE — PROGRESS NOTES
Assessment and Plan:     Problem List Items Addressed This Visit     None           Preventive health issues were discussed with patient, and age appropriate screening tests were ordered as noted in patient's After Visit Summary  Personalized health advice and appropriate referrals for health education or preventive services given if needed, as noted in patient's After Visit Summary  History of Present Illness:     Patient presents for Welcome to Medicare visit       Patient Care Team:  Yumiko Deras MD as PCP - General  ALEC Leonard (Pain Medicine)     Review of Systems:     Review of Systems   Problem List:     Patient Active Problem List   Diagnosis    Encounter for hepatitis C screening test for low risk patient    Pain    Anxiety    Benign essential hypertension    Chronic obstructive pulmonary disease ([facility] [address] )    Screening for neurological condition    Chronic low back pain    Early satiety    GERD (gastroesophageal reflux disease)    Fatigue    Headache    Incisional hernia    Insomnia    Left knee pain    Neck pain    Osteoarthritis of both knees    Other chest pain    Other instability, right knee    Pain in joint of left shoulder    Pain of left hip    Weight loss, non-intentional    Lumbar spondylosis    Bulging of lumbar intervertebral disc    Low back pain    Displacement of lumbar intervertebral disc without myelopathy    Screening for AAA (abdominal aortic aneurysm)    Medicare annual wellness visit, initial    Lumbar radiculopathy    Uncomplicated opioid dependence ([facility] [address] )    Abnormal CT scan of lung    Upper respiratory tract infection    Acute respiratory failure with hypoxia ([facility] [address] )    ASCVD (arteriosclerotic cardiovascular disease)      Past Medical and Surgical History:     Past Medical History:   Diagnosis Date    Chronic pain disorder     back    Hyperlipidemia     Hypertension     Hyperthyroidism     last assessed: 10/28/16    NSTEMI (non-ST elevated myocardial infarction) ([facility] [address] ) 4/13/2020     Past Surgical History:   Procedure Laterality Date    CARDIAC CATHETERIZATION  07/02/2020    EF 65% normal    COLONOSCOPY      EPIDURAL BLOCK INJECTION N/A 1/17/2019    Procedure: L5 S1 Lumbar Epidural Steroid Injection (17997);   Surgeon: Britney Major MD;  [facility];  Service: Pain Management     EPIDURAL BLOCK INJECTION Left 3/6/2019    Procedure: L4- L5 and L5-S1 transforaminal epidural steroid injection;  Surgeon: rBitney Major MD;  [facility];  Service: Pain Management     FL GUIDED NEEDLE PLAC BX/ASP/INJ  3/6/2019    INGUINAL HERNIA REPAIR Bilateral     ND INJECTION,SACROILIAC JOINT Left 6/24/2021    Procedure: BLOCK / INJECTION SACROILIAC;  Surgeon: Hyacinth Garcia MD;  Location:  [facility];  Service: Pain Management       Family History:     Family History   Problem Relation Age of Onset    Heart disease Mother     Heart disease Father         cardiac disorder    Heart disease Maternal Grandmother         cardiac disorder    Heart disease Maternal Grandfather         cardiac disorder    Heart disease Paternal Grandmother         cardiac disorder    Heart disease Paternal Grandfather         cardiac disorder      Social History:     Social History     Socioeconomic History    Marital status: Single     Spouse name: Not on file    Number of children: Not on file    Years of education: Not on file    Highest education level: Not on file   Occupational History    Not on file   Tobacco Use    Smoking status: Current Every Day Smoker     Packs/day: 1 00     Types: Cigarettes    Smokeless tobacco: Never Used   Vaping Use    Vaping Use: Never used   Substance and Sexual Activity    Alcohol use: No    Drug use: No    Sexual activity: Not on file   Other Topics Concern    Not on file   Social History Narrative    Not on file     Social Determinants of Health     Financial Resource Strain:     Difficulty of Paying Living Expenses:    Food Insecurity:     Worried About Running Out of Food in the Last Year:     [address] in the Last Year:    Transportation Needs:     Lack of Transportation (Medical):      Lack of Transportation (Non-Medical):    Physical Activity:     Days of Exercise per Week:     Minutes of Exercise per Session:    Stress:     Feeling of Stress :    Social Connections:     Frequency of Communication with Friends and Family:     Frequency of Social Gatherings with Friends and Family:     Attends [employer] Services:     Active Member of Clubs or Organizations:     Attends Club or Organization Meetings:     Marital Status:    Intimate Partner Violence:     Fear of Current or Ex-Partner:     Emotionally Abused:     Physically Abused:     Sexually Abused:       Medications and Allergies:     Current Outpatient Medications   Medication Sig Dispense Refill    acetaminophen-codeine (TYLENOL #4) 300-60 MG per tablet Take 1 tablet by mouth every 6 (six) hours as needed for moderate pain 120 tablet 0    albuterol (2 5 mg/3 mL) 0 083 % nebulizer solution inhale contents of 1 vial ( 3 milliliters ) in nebulizer by mouth every 4 hours 300 mL 5    albuterol (PROVENTIL HFA,VENTOLIN HFA) 90 mcg/act inhaler Inhale 2 puffs every 6 (six) hours as needed for wheezing 8 5 g 0    ALPRAZolam (XANAX) 0 25 mg tablet take 1 tablet by mouth three times a day if needed for ANXIETY 90 tablet 5    aspirin (Aspirin 81) 81 mg EC tablet Take 1 tablet (81 mg total) by mouth daily 90 tablet 0    atorvastatin (LIPITOR) 80 mg tablet Take 1 tablet (80 mg total) by mouth every evening 90 tablet 3    clopidogrel (PLAVIX) 75 mg tablet take 1 tablet by mouth once daily 90 tablet 0    Diclofenac Sodium (VOLTAREN) 1 % Apply 4 g topically 4 (four) times a day 5 Tube 5    DULoxetine (CYMBALTA) 60 mg delayed release capsule take 1 capsule by mouth once daily 90 capsule 3    metoprolol succinate (TOPROL-XL) 25 mg 24 hr tablet take 1 tablet by mouth once daily 90 tablet 0    naloxone (NARCAN) 4 mg/0 1 mL nasal spray Administer 1 spray into a nostril  If no response after 2-3 minutes, give another dose in the other nostril using a new spray  1 each 1    oxyCODONE-acetaminophen (PERCOCET) 5-325 mg per tablet Take 1 tablet by mouth every 6 (six) hours as needed for moderate pain or severe painMax Daily Amount: 4 tablets 120 tablet 0    pantoprazole (PROTONIX) 40 mg tablet Take 1 tablet (40 mg total) by mouth daily 90 tablet 0    Trelegy Ellipta 100-62 5-25 MCG/INH inhaler INHALE 1 PUFF DAILY RINSE MOUTH AFTER USE 3 Inhaler 3    zolpidem (AMBIEN) 10 mg tablet Take 1 tablet (10 mg total) by mouth daily at bedtime as needed for sleep 30 tablet 5     No current facility-administered medications for this visit  No Known Allergies   Immunizations:     Immunization History   Administered Date(s) Administered    H1N1, All Formulations 01/08/2010    INFLUENZA 11/03/2011, 01/01/2012, 01/01/2014, 08/26/2014, 08/01/2015, 08/15/2016, 09/23/2017, 09/09/2018, 08/31/2020    Influenza Quadrivalent Preservative Free 3 years and older IM 09/23/2017    Influenza, high dose seasonal 0 7 mL 08/31/2020    Influenza, seasonal, injectable 08/26/2014    Influenza, seasonal, injectable, preservative free 08/01/2015    Pneumococcal Conjugate 13-Valent 08/01/2015, 08/31/2020    Pneumococcal Conjugate PCV 7 01/01/2011    Pneumococcal Polysaccharide PPV23 09/09/2018    SARS-CoV-2 / COVID-19 mRNA IM (Pfizer-BioNTech) 03/22/2021, 04/14/2021    Tdap 08/26/2014    Tuberculin Skin Test-PPD Intradermal 10/04/2013, 10/23/2014    Zoster Vaccine Recombinant 06/17/2020, 08/21/2020      Health Maintenance:         Topic Date Due    Colorectal Cancer Screening  12/10/2023    Hepatitis C Screening  Completed     There are no preventive care reminders to display for this patient     Medicare Screening Tests and Risk Assessments:     Veronique Preciado is here for his Subsequent Wellness visit  Last Medicare Wellness visit information reviewed, patient interviewed and updates made to the record today  Health Risk Assessment:   Patient rates overall health as good  Patient feels that their physical health rating is same  Patient is satisfied with their life  Eyesight was rated as slightly worse  Hearing was rated as same  Patient feels that their emotional and mental health rating is same  Patients states they are sometimes angry  Patient states they are sometimes unusually tired/fatigued  Pain experienced in the last 7 days has been a lot  Patient's pain rating has been 8/10  Patient states that he has experienced weight loss or gain in last 6 months  Depression Screening:   PHQ-2 Score: 2      Fall Risk Screening: In the past year, patient has experienced: no history of falling in past year      Home Safety:  Patient does not have trouble with stairs inside or outside of their home  Patient has working smoke alarms and has no working carbon monoxide detector  Home safety hazards include: none  Nutrition:   Current diet is Regular  Medications:   Patient is not currently taking any over-the-counter supplements  Patient is able to manage medications  Activities of Daily Living (ADLs)/Instrumental Activities of Daily Living (IADLs):   Walk and transfer into and out of bed and chair?: Yes  Dress and groom yourself?: Yes    Bathe or shower yourself?: Yes    Feed yourself?  Yes  Do your laundry/housekeeping?: Yes  Manage your money, pay your bills and track your expenses?: Yes  Make your own meals?: Yes    Do your own shopping?: Yes    Previous Hospitalizations:   Any hospitalizations or ED visits within the last 12 months?: No      Advance Care Planning:   Living will: No    Durable POA for healthcare: No    Advanced directive: No      PREVENTIVE SCREENINGS      Cardiovascular Screening:    General: Screening Current      Diabetes Screening:     General: Screening Current      Colorectal Cancer Screening:     General: Screening Current      Prostate Cancer Screening:    General: Screening Not Indicated      Osteoporosis Screening:    General: Screening Not Indicated      Abdominal Aortic Aneurysm (AAA) Screening:    Risk factors include: age between 73-67 yo and tobacco use        Lung Cancer Screening:     General: Screening Not Indicated      Hepatitis C Screening:    General: Screening Current    Screening, Brief Intervention, and Referral to Treatment (SBIRT)    Screening  Typical number of drinks in a day: 0  Typical number of drinks in a week: 0  Interpretation: Low risk drinking behavior  Single Item Drug Screening:  How often have you used an illegal drug (including marijuana) or a prescription medication for non-medical reasons in the past year? never    Single Item Drug Screen Score: 0  Interpretation: Negative screen for possible drug use disorder    Review of Current Opioid Use    Opioid Risk Tool (ORT) Interpretation: Complete Opioid Risk Tool (ORT)    No exam data present     Physical Exam:     There were no vitals taken for this visit      Physical Exam     Ever MD Camille

## 2021-07-14 NOTE — PATIENT INSTRUCTIONS
Medicare Preventive Visit Patient Instructions  Thank you for completing your Welcome to Medicare Visit or Medicare Annual Wellness Visit today  Your next wellness visit will be due in one year (7/15/2022)  The screening/preventive services that you may require over the next 5-10 years are detailed below  Some tests may not apply to you based off risk factors and/or age  Screening tests ordered at today's visit but not completed yet may show as past due  Also, please note that scanned in results may not display below  Preventive Screenings:  Service Recommendations Previous Testing/Comments   Colorectal Cancer Screening  · Colonoscopy    · Fecal Occult Blood Test (FOBT)/Fecal Immunochemical Test (FIT)  · Fecal DNA/Cologuard Test  · Flexible Sigmoidoscopy Age: 54-65 years old   Colonoscopy: every 10 years (May be performed more frequently if at higher risk)  OR  FOBT/FIT: every 1 year  OR  Cologuard: every 3 years  OR  Sigmoidoscopy: every 5 years  Screening may be recommended earlier than age 48 if at higher risk for colorectal cancer  Also, an individualized decision between you and your healthcare provider will decide whether screening between the ages of 74-80 would be appropriate   Colonoscopy: 12/10/2020  FOBT/FIT: Not on file  Cologuard: Not on file  Sigmoidoscopy: Not on file    Screening Current     Prostate Cancer Screening Individualized decision between patient and health care provider in men between ages of 53-78   Medicare will cover every 12 months beginning on the day after your 50th birthday PSA: No results in last 5 years           Hepatitis C Screening Once for adults born between 1945 and 1965  More frequently in patients at high risk for Hepatitis C Hep C Antibody: 04/29/2019    Screening Current   Diabetes Screening 1-2 times per year if you're at risk for diabetes or have pre-diabetes Fasting glucose: 114 mg/dL   A1C: No results in last 5 years        Cholesterol Screening Once every 5 years if you don't have a lipid disorder  May order more often based on risk factors  Lipid panel: 01/20/2021    Screening Current      Other Preventive Screenings Covered by Medicare:  1  Abdominal Aortic Aneurysm (AAA) Screening: covered once if your at risk  You're considered to be at risk if you have a family history of AAA or a male between the age of 73-68 who smoking at least 100 cigarettes in your lifetime  2  Lung Cancer Screening: covers low dose CT scan once per year if you meet all of the following conditions: (1) Age 50-69; (2) No signs or symptoms of lung cancer; (3) Current smoker or have quit smoking within the last 15 years; (4) You have a tobacco smoking history of at least 30 pack years (packs per day x number of years you smoked); (5) You get a written order from a healthcare provider  3  Glaucoma Screening: covered annually if you're considered high risk: (1) You have diabetes OR (2) Family history of glaucoma OR (3)  aged 48 and older OR (3)  American aged 72 and older  3  Osteoporosis Screening: covered every 2 years if you meet one of the following conditions: (1) Have a vertebral abnormality; (2) On glucocorticoid therapy for more than 3 months; (3) Have primary hyperparathyroidism; (4) On osteoporosis medications and need to assess response to drug therapy  5  HIV Screening: covered annually if you're between the age of 12-76  Also covered annually if you are younger than 13 and older than 72 with risk factors for HIV infection  For pregnant patients, it is covered up to 3 times per pregnancy      Immunizations:  Immunization Recommendations   Influenza Vaccine Annual influenza vaccination during flu season is recommended for all persons aged >= 6 months who do not have contraindications   Pneumococcal Vaccine (Prevnar and Pneumovax)  * Prevnar = PCV13  * Pneumovax = PPSV23 Adults 25-60 years old: 1-3 doses may be recommended based on certain risk factors  Adults 65+ years old: Prevnar (PCV13) vaccine recommended followed by Pneumovax (PPSV23) vaccine  If already received PPSV23 since turning 65, then PCV13 recommended at least one year after PPSV23 dose  Hepatitis B Vaccine 3 dose series if at intermediate or high risk (ex: diabetes, end stage renal disease, liver disease)   Tetanus (Td) Vaccine - COST NOT COVERED BY MEDICARE PART B Following completion of primary series, a booster dose should be given every 10 years to maintain immunity against tetanus  Td may also be given as tetanus wound prophylaxis  Tdap Vaccine - COST NOT COVERED BY MEDICARE PART B Recommended at least once for all adults  For pregnant patients, recommended with each pregnancy  Shingles Vaccine (Shingrix) - COST NOT COVERED BY MEDICARE PART B  2 shot series recommended in those aged 48 and above     Health Maintenance Due:      Topic Date Due    Colorectal Cancer Screening  12/10/2023    Hepatitis C Screening  Completed     Immunizations Due:  There are no preventive care reminders to display for this patient  Advance Directives   What are advance directives? Advance directives are legal documents that state your wishes and plans for medical care  These plans are made ahead of time in case you lose your ability to make decisions for yourself  Advance directives can apply to any medical decision, such as the treatments you want, and if you want to donate organs  What are the types of advance directives? There are many types of advance directives, and each state has rules about how to use them  You may choose a combination of any of the following:  · Living will: This is a written record of the treatment you want  You can also choose which treatments you do not want, which to limit, and which to stop at a certain time  This includes surgery, medicine, IV fluid, and tube feedings  · Durable power of  for healthcare [facility]):   This is a written record that states who you want to make healthcare choices for you when you are unable to make them for yourself  This person, called a proxy, is usually a family member or a friend  You may choose more than 1 proxy  · Do not resuscitate (DNR) order:  A DNR order is used in case your heart stops beating or you stop breathing  It is a request not to have certain forms of treatment, such as CPR  A DNR order may be included in other types of advance directives  · Medical directive: This covers the care that you want if you are in a coma, near death, or unable to make decisions for yourself  You can list the treatments you want for each condition  Treatment may include pain medicine, surgery, blood transfusions, dialysis, IV or tube feedings, and a ventilator (breathing machine)  · Values history: This document has questions about your views, beliefs, and how you feel and think about life  This information can help others choose the care that you would choose  Why are advance directives important? An advance directive helps you control your care  Although spoken wishes may be used, it is better to have your wishes written down  Spoken wishes can be misunderstood, or not followed  Treatments may be given even if you do not want them  An advance directive may make it easier for your family to make difficult choices about your care  Cigarette Smoking and Your Health   Risks to your health if you smoke:  Nicotine and other chemicals found in tobacco damage every cell in your body  Even if you are a light smoker, you have an increased risk for cancer, heart disease, and lung disease  If you are pregnant or have diabetes, smoking increases your risk for complications  Benefits to your health if you stop smoking:   · You decrease respiratory symptoms such as coughing, wheezing, and shortness of breath  · You reduce your risk for cancers of the lung, mouth, throat, kidney, bladder, pancreas, stomach, and cervix   If you already have cancer, you increase the benefits of chemotherapy  You also reduce your risk for cancer returning or a second cancer from developing  · You reduce your risk for heart disease, blood clots, heart attack, and stroke  · You reduce your risk for lung infections, and diseases such as pneumonia, asthma, chronic bronchitis, and emphysema  · Your circulation improves  More oxygen can be delivered to your body  If you have diabetes, you lower your risk for complications, such as kidney, artery, and eye diseases  You also lower your risk for nerve damage  Nerve damage can lead to amputations, poor vision, and blindness  · You improve your body's ability to heal and to fight infections  For more information and support to stop smoking:   · [employer]  Phone: 1- 077 - 394-7312  Web Address: [employer]  Narcotic (Opioid) Safety    Use narcotics safely:  · Take prescribed narcotics exactly as directed  · Do not give narcotics to others or take narcotics that belong to someone else  · Do not mix narcotics without medicines or alcohol  · Do not drive or operate heavy machinery after you take the narcotic  · Monitor for side effects and notify your healthcare provider if you experienced side effects such as nausea, sleepiness, itching, or trouble thinking clearly  Manage constipation:    Constipation is the most common side effect of narcotic medicine  Constipation is when you have hard, dry bowel movements, or you go longer than usual between bowel movements  Tell your healthcare provider about all changes in your bowel movements while you are taking narcotics  He or she may recommend laxative medicine to help you have a bowel movement  He or she may also change the kind of narcotic you are taking, or change when you take it  The following are more ways you can prevent or relieve constipation:    · Drink liquids as directed  You may need to drink extra liquids to help soften and move your bowels   Ask how much liquid to drink each day and which liquids are best for you  · Eat high-fiber foods  This may help decrease constipation by adding bulk to your bowel movements  High-fiber foods include fruits, vegetables, whole-grain breads and cereals, and beans  Your healthcare provider or dietitian can help you create a high-fiber meal plan  Your provider may also recommend a fiber supplement if you cannot get enough fiber from food  · Exercise regularly  Regular physical activity can help stimulate your intestines  Walking is a good exercise to prevent or relieve constipation  Ask which exercises are best for you  · Schedule a time each day to have a bowel movement  This may help train your body to have regular bowel movements  Bend forward while you are on the toilet to help move the bowel movement out  Sit on the toilet for at least 10 minutes, even if you do not have a bowel movement  Store narcotics safely:   · Store narcotics where others cannot easily get them  Keep them in a locked cabinet or secure area  Do not  keep them in a purse or other bag you carry with you  A person may be looking for something else and find the narcotics  · Make sure narcotics are stored out of the reach of children  A child can easily overdose on narcotics  Narcotics may look like candy to a small child  The best way to dispose of narcotics: The laws vary by country and area  In the [place], the best way is to return the narcotics through a take-back program  This program is offered by the [employer]  The following are options for using the program:  · Take the narcotics to a BINH collection site  The site is often a law enforcement center  Call your local law enforcement center for scheduled take-back days in your area  You will be given information on where to go if the collection site is in a different location    · Take the narcotics to an approved pharmacy or hospital   A pharmacy or hospital may be set up as a collection site  You will need to ask if it is a BINH collection site if you were not directed there  A pharmacy or doctor's office may not be able to take back narcotics unless it is a BINH site  · Use a mail-back system  This means you are given containers to put the narcotics into  You will then mail them in the containers  · Use a take-back drop box  This is a place to leave the narcotics at any time  People and animals will not be able to get into the box  Your local law enforcement agency can tell you where to find a drop box in your area  Other ways to manage pain:   · Ask your healthcare provider about non-narcotic medicines to control pain  Nonprescription medicines include NSAIDs (such as ibuprofen) and acetaminophen  Prescription medicines include muscle relaxers, antidepressants, and steroids  · Pain may be managed without any medicines  Some ways to relieve pain include massage, aromatherapy, or meditation  Physical or occupational therapy may also help  For more information:   · Drug Enforcement Administration  [address] [place] [address] [address]  Phone: 9- 861 - [phone]  Web Address: [facility] gov/drug_disposal/    · [address] and Drug Administration  [address] [address]a  [place] [address] [facility]  Phone: 4- 184 - [phone]  Web Address: http://[employer]/     © Copyright [employer] 2018 Information is for End User's use only and may not be sold, redistributed or otherwise used for commercial purposes   All illustrations and images included in CareNotes® are the copyrighted property of A D A M , Inc  or [address] [place] [address] [employer]

## 2021-07-15 ENCOUNTER — OFFICE VISIT (OUTPATIENT)
Dept: FAMILY MEDICINE CLINIC | Facility: CLINIC | Age: 66
End: 2021-07-15
Payer: COMMERCIAL

## 2021-07-15 ENCOUNTER — TELEPHONE (OUTPATIENT)
Dept: OBGYN CLINIC | Facility: CLINIC | Age: 66
End: 2021-07-15

## 2021-07-15 VITALS
HEIGHT: 72 IN | TEMPERATURE: 98.1 F | WEIGHT: 140 LBS | SYSTOLIC BLOOD PRESSURE: 142 MMHG | BODY MASS INDEX: 18.96 KG/M2 | DIASTOLIC BLOOD PRESSURE: 84 MMHG | OXYGEN SATURATION: 97 % | HEART RATE: 72 BPM

## 2021-07-15 DIAGNOSIS — F41.9 ANXIETY: ICD-10-CM

## 2021-07-15 DIAGNOSIS — M47.816 LUMBAR SPONDYLOSIS: Primary | ICD-10-CM

## 2021-07-15 DIAGNOSIS — J43.9 PULMONARY EMPHYSEMA, UNSPECIFIED EMPHYSEMA TYPE (HCC): ICD-10-CM

## 2021-07-15 DIAGNOSIS — R91.8 ABNORMAL CT SCAN OF LUNG: ICD-10-CM

## 2021-07-15 DIAGNOSIS — E44.0 MODERATE PROTEIN-CALORIE MALNUTRITION (HCC): ICD-10-CM

## 2021-07-15 DIAGNOSIS — F11.20 UNCOMPLICATED OPIOID DEPENDENCE (HCC): ICD-10-CM

## 2021-07-15 DIAGNOSIS — G89.4 CHRONIC PAIN SYNDROME: ICD-10-CM

## 2021-07-15 DIAGNOSIS — J44.9 CHRONIC OBSTRUCTIVE PULMONARY DISEASE, UNSPECIFIED COPD TYPE (HCC): ICD-10-CM

## 2021-07-15 DIAGNOSIS — R91.1 PULMONARY NODULE: ICD-10-CM

## 2021-07-15 PROCEDURE — 3725F SCREEN DEPRESSION PERFORMED: CPT | Performed by: FAMILY MEDICINE

## 2021-07-15 PROCEDURE — 4004F PT TOBACCO SCREEN RCVD TLK: CPT | Performed by: FAMILY MEDICINE

## 2021-07-15 PROCEDURE — 3008F BODY MASS INDEX DOCD: CPT | Performed by: FAMILY MEDICINE

## 2021-07-15 PROCEDURE — 1160F RVW MEDS BY RX/DR IN RCRD: CPT | Performed by: FAMILY MEDICINE

## 2021-07-15 PROCEDURE — 99214 OFFICE O/P EST MOD 30 MIN: CPT | Performed by: FAMILY MEDICINE

## 2021-07-15 PROCEDURE — 3008F BODY MASS INDEX DOCD: CPT | Performed by: ANESTHESIOLOGY

## 2021-07-15 PROCEDURE — 1170F FXNL STATUS ASSESSED: CPT | Performed by: FAMILY MEDICINE

## 2021-07-15 PROCEDURE — 1125F AMNT PAIN NOTED PAIN PRSNT: CPT | Performed by: FAMILY MEDICINE

## 2021-07-15 PROCEDURE — G0439 PPPS, SUBSEQ VISIT: HCPCS | Performed by: FAMILY MEDICINE

## 2021-07-15 RX ORDER — OXYCODONE AND ACETAMINOPHEN 10; 325 MG/1; MG/1
1 TABLET ORAL EVERY 6 HOURS PRN
Qty: 120 TABLET | Refills: 0 | Status: SHIPPED | OUTPATIENT
Start: 2021-07-15 | End: 2021-08-12 | Stop reason: SDUPTHER

## 2021-07-15 RX ORDER — ALBUTEROL SULFATE 90 UG/1
2 AEROSOL, METERED RESPIRATORY (INHALATION) EVERY 6 HOURS PRN
Qty: 8.5 G | Refills: 0 | Status: SHIPPED | OUTPATIENT
Start: 2021-07-15 | End: 2021-09-03

## 2021-07-15 NOTE — PROGRESS NOTES
Answers for HPI/ROS submitted by the patient on 7/8/2021  How would you rate your overall health?: good  Compared to last year, how is your physical health?: same  In general, how satisfied are you with your life?: satisfied  Compared to last year, how is your eyesight?: slightly worse  Compared to last year, how is your hearing?: same  Compared to last year, how is your emotional/mental health?: same  How often is anger a problem for you?: sometimes  How often do you feel unusually tired/fatigued?: sometimes  In the past 7 days, how much pain have you experienced?: a lot  If you answered "some" or "a lot", please rate the severity of your pain on a scale of 1 to 10 (1 being the least severe pain and 10 being the most intense pain)  : 8/10  In the past 6 months, have you lost or gained 10 pounds without trying?: Yes  One or more falls in the last year: No  Do you have trouble with the stairs inside or outside your home?: No  Does your home have working smoke alarms?: Yes  Does your home have a carbon monoxide monitor?: No  Which safety hazards (if any) have you experienced in your home? Please select all that apply : none  How would you describe your current diet?  Please select all that apply : Regular  In addition to prescription medications, are you taking any over-the-counter supplements?: No  Can you manage your medications?: Yes  Are you currently taking any opioid medications?: Yes  Can you walk and transfer into and out of your bed and chair?: Yes  Can you dress and groom yourself?: Yes  Can you bathe or shower yourself?: Yes  Can you feed yourself?: Yes  Can you do your laundry/ housekeeping?: Yes  Can you manage your money, pay your bills, and track your expenses?: Yes  Can you make your own meals?: Yes  Can you do your own shopping?: Yes  Within the last 12 months, have you had any hospitalizations or Emergency Department visits?: No  Do you have a living will?: No  Do you have a Durable POA (Power of ) for healthcare decisions?: No  Do you have an Advanced Directive for end of life decisions?: No  How often have you used an illegal drug (including marijuana) or a prescription medication for non-medical reasons in the past year?: never  What is the typical number of drinks you consume in a day?: 0  What is the typical number of drinks you consume in a week?: 0  How often did you have a drink containing alcohol in the past year?: never  How many drinks did you have on a typical day  when you were drinking in the past year?: never  How often did you have 6 or more drinks on one occasion in the past year?: never    Assessment/Plan:    No problem-specific Assessment & Plan notes found for this encounter  Diagnoses and all orders for this visit:    Lumbar spondylosis  -     oxyCODONE-acetaminophen (PERCOCET)  mg per tablet; Take 1 tablet by mouth every 6 (six) hours as needed for moderate painMax Daily Amount: 4 tablets    Chronic pain syndrome  -     oxyCODONE-acetaminophen (PERCOCET)  mg per tablet; Take 1 tablet by mouth every 6 (six) hours as needed for moderate painMax Daily Amount: 4 tablets    Moderate protein-calorie malnutrition (HCC)    Uncomplicated opioid dependence (HCC)    Abnormal CT scan of lung    Pulmonary nodule  -     CT chest wo contrast; Future    Pulmonary emphysema, unspecified emphysema type (HCC)    Anxiety          Subjective:      Patient ID: Abelardo Luong is a 77 y o  male  He has acute on chronic low back pain  He is requesting an increase in his pain medication  He states his current regimen is not helping  He sees pain management for injections  He is wearing a back brace today  He has no red flags for back pain  He has CAD  He has no recent CP or SOB  He has no PND or orthopnea  His COPD is quite  He has no cough, wheeze, sputum production, or dyspnea        The following portions of the patient's history were reviewed and updated as appropriate:   He  has a past medical history of Anxiety, Chronic pain disorder, COPD (chronic obstructive pulmonary disease) (HCC) (na), Disease of thyroid gland, GERD (gastroesophageal reflux disease), Headache(784 0), Hyperlipidemia, Hypertension, Hyperthyroidism, NSTEMI (non-ST elevated myocardial infarction) ([facility] [address] ) (4/13/2020), Pneumonia, and Visual impairment  He   Patient Active Problem List    Diagnosis Date Noted    ASCVD (arteriosclerotic cardiovascular disease) 04/15/2021    Acute respiratory failure with hypoxia ([facility]) 01/15/2021    Abnormal CT scan of lung 10/15/2020    Upper respiratory tract infection 76/48/8737    Uncomplicated opioid dependence ([facility] [address] ) 01/21/2020    Lumbar radiculopathy 02/26/2019    Screening for AAA (abdominal aortic aneurysm) 01/18/2019    Medicare annual wellness visit, initial 01/18/2019    Low back pain 12/19/2018    Displacement of lumbar intervertebral disc without myelopathy 12/19/2018    Lumbar spondylosis 12/14/2018    Bulging of lumbar intervertebral disc 12/14/2018    Screening for neurological condition 10/18/2018    Encounter for hepatitis C screening test for low risk patient 04/13/2018    Pain 04/13/2018    Benign essential hypertension 01/15/2018    Osteoarthritis of both knees 10/18/2017    Other instability, right knee 10/18/2017    Left knee pain 09/09/2016    Pain of left hip 09/09/2016    Neck pain 08/05/2016    Pain in joint of left shoulder 08/05/2016    Other chest pain 06/07/2016    Chronic low back pain 10/08/2015    Insomnia 09/29/2015    Early satiety 02/26/2015    GERD (gastroesophageal reflux disease) 10/31/2014    Headache 05/16/2014    Incisional hernia 10/11/2013    Weight loss, non-intentional 10/04/2013    Fatigue 06/26/2013    Anxiety 11/14/2012    Chronic obstructive pulmonary disease ([facility] [address] ) 11/14/2012     He  has a past surgical history that includes Inguinal hernia repair (Bilateral);  Epidural block injection (N/A, 1/17/2019); Epidural block injection (Left, 3/6/2019); FL guided needle plac bx/asp/inj (3/6/2019); Colonoscopy; Cardiac catheterization (07/02/2020); and pr injection,sacroiliac joint (Left, 6/24/2021)  His family history includes Heart disease in his father, maternal grandfather, maternal grandmother, mother, paternal grandfather, and paternal grandmother  He  reports that he has been smoking cigarettes  He has a 15 00 pack-year smoking history  He has never used smokeless tobacco  He reports that he does not drink alcohol and does not use drugs  Current Outpatient Medications   Medication Sig Dispense Refill    acetaminophen-codeine (TYLENOL #4) 300-60 MG per tablet Take 1 tablet by mouth every 6 (six) hours as needed for moderate pain 120 tablet 0    albuterol (2 5 mg/3 mL) 0 083 % nebulizer solution inhale contents of 1 vial ( 3 milliliters ) in nebulizer by mouth every 4 hours 300 mL 5    albuterol (PROVENTIL HFA,VENTOLIN HFA) 90 mcg/act inhaler Inhale 2 puffs every 6 (six) hours as needed for wheezing 8 5 g 0    ALPRAZolam (XANAX) 0 25 mg tablet take 1 tablet by mouth three times a day if needed for ANXIETY 90 tablet 5    aspirin (Aspirin 81) 81 mg EC tablet Take 1 tablet (81 mg total) by mouth daily 90 tablet 0    atorvastatin (LIPITOR) 80 mg tablet Take 1 tablet (80 mg total) by mouth every evening 90 tablet 3    clopidogrel (PLAVIX) 75 mg tablet take 1 tablet by mouth once daily 90 tablet 0    Diclofenac Sodium (VOLTAREN) 1 % Apply 4 g topically 4 (four) times a day 5 Tube 5    DULoxetine (CYMBALTA) 60 mg delayed release capsule take 1 capsule by mouth once daily 90 capsule 3    metoprolol succinate (TOPROL-XL) 25 mg 24 hr tablet take 1 tablet by mouth once daily 90 tablet 0    naloxone (NARCAN) 4 mg/0 1 mL nasal spray Administer 1 spray into a nostril  If no response after 2-3 minutes, give another dose in the other nostril using a new spray   1 each 1    pantoprazole (PROTONIX) 40 mg tablet Take 1 tablet (40 mg total) by mouth daily 90 tablet 0    Trelegy Ellipta 100-62 5-25 MCG/INH inhaler INHALE 1 PUFF DAILY RINSE MOUTH AFTER USE 3 Inhaler 3    zolpidem (AMBIEN) 10 mg tablet Take 1 tablet (10 mg total) by mouth daily at bedtime as needed for sleep 30 tablet 5    oxyCODONE-acetaminophen (PERCOCET)  mg per tablet Take 1 tablet by mouth every 6 (six) hours as needed for moderate painMax Daily Amount: 4 tablets 120 tablet 0     No current facility-administered medications for this visit  Current Outpatient Medications on File Prior to Visit   Medication Sig    acetaminophen-codeine (TYLENOL #4) 300-60 MG per tablet Take 1 tablet by mouth every 6 (six) hours as needed for moderate pain    albuterol (2 5 mg/3 mL) 0 083 % nebulizer solution inhale contents of 1 vial ( 3 milliliters ) in nebulizer by mouth every 4 hours    albuterol (PROVENTIL HFA,VENTOLIN HFA) 90 mcg/act inhaler Inhale 2 puffs every 6 (six) hours as needed for wheezing    ALPRAZolam (XANAX) 0 25 mg tablet take 1 tablet by mouth three times a day if needed for ANXIETY    aspirin (Aspirin 81) 81 mg EC tablet Take 1 tablet (81 mg total) by mouth daily    atorvastatin (LIPITOR) 80 mg tablet Take 1 tablet (80 mg total) by mouth every evening    clopidogrel (PLAVIX) 75 mg tablet take 1 tablet by mouth once daily    Diclofenac Sodium (VOLTAREN) 1 % Apply 4 g topically 4 (four) times a day    DULoxetine (CYMBALTA) 60 mg delayed release capsule take 1 capsule by mouth once daily    metoprolol succinate (TOPROL-XL) 25 mg 24 hr tablet take 1 tablet by mouth once daily    naloxone (NARCAN) 4 mg/0 1 mL nasal spray Administer 1 spray into a nostril  If no response after 2-3 minutes, give another dose in the other nostril using a new spray      pantoprazole (PROTONIX) 40 mg tablet Take 1 tablet (40 mg total) by mouth daily    Trelegy Ellipta 100-62 5-25 MCG/INH inhaler INHALE 1 PUFF DAILY RINSE MOUTH AFTER USE    zolpidem (AMBIEN) 10 mg tablet Take 1 tablet (10 mg total) by mouth daily at bedtime as needed for sleep    [DISCONTINUED] oxyCODONE-acetaminophen (PERCOCET) 5-325 mg per tablet Take 1 tablet by mouth every 6 (six) hours as needed for moderate pain or severe painMax Daily Amount: 4 tablets     No current facility-administered medications on file prior to visit  He has No Known Allergies       Review of Systems   Musculoskeletal: Positive for arthralgias and back pain  All other systems reviewed and are negative  Objective:      /84 (BP Location: Left arm, Patient Position: Sitting, Cuff Size: Standard)   Pulse 72   Temp 98 1 °F (36 7 °C)   Ht 6' (1 829 m)   Wt 63 5 kg (140 lb)   SpO2 97%   BMI 18 99 kg/m²          Physical Exam  Vitals and nursing note reviewed  Constitutional:       Appearance: Normal appearance  He is normal weight  HENT:      Head: Normocephalic and atraumatic  Cardiovascular:      Rate and Rhythm: Normal rate and regular rhythm  Pulses: Normal pulses  Heart sounds: Normal heart sounds  Pulmonary:      Effort: Pulmonary effort is normal       Breath sounds: Normal breath sounds  Abdominal:      General: Abdomen is flat  Bowel sounds are normal       Palpations: Abdomen is soft  Musculoskeletal:         General: Normal range of motion  Cervical back: Normal range of motion and neck supple  Skin:     General: Skin is warm  Capillary Refill: Capillary refill takes less than 2 seconds  Neurological:      General: No focal deficit present  Mental Status: He is alert and oriented to person, place, and time  Mental status is at baseline  Psychiatric:         Mood and Affect: Mood normal          Behavior: Behavior normal          Thought Content:  Thought content normal          Judgment: Judgment normal

## 2021-07-16 NOTE — TELEPHONE ENCOUNTER
Patient requested to change procedure date from 7/27/21 to 8/3/21  New Plavix hold date is 7/26/21  Patient understands and agrees

## 2021-07-20 ENCOUNTER — HOSPITAL ENCOUNTER (OUTPATIENT)
Dept: CT IMAGING | Facility: HOSPITAL | Age: 66
Discharge: HOME | End: 2021-07-20
Attending: ANESTHESIOLOGY
Payer: COMMERCIAL

## 2021-07-20 ENCOUNTER — HOSPITAL ENCOUNTER (OUTPATIENT)
Dept: CT IMAGING | Facility: HOSPITAL | Age: 66
Discharge: HOME | End: 2021-07-20
Attending: FAMILY MEDICINE
Payer: COMMERCIAL

## 2021-07-20 DIAGNOSIS — M84.48XG SACRAL INSUFFICIENCY FRACTURE WITH DELAYED HEALING, SUBSEQUENT ENCOUNTER: ICD-10-CM

## 2021-07-20 DIAGNOSIS — R52 PAIN: ICD-10-CM

## 2021-07-20 DIAGNOSIS — R91.1 PULMONARY NODULE: ICD-10-CM

## 2021-07-20 PROCEDURE — G1004 CDSM NDSC: HCPCS

## 2021-07-20 PROCEDURE — 71250 CT THORAX DX C-: CPT

## 2021-07-20 PROCEDURE — 72192 CT PELVIS W/O DYE: CPT

## 2021-07-20 RX ORDER — ACETAMINOPHEN AND CODEINE PHOSPHATE 60; 300 MG/1; MG/1
1 TABLET ORAL EVERY 6 HOURS PRN
Qty: 120 TABLET | Refills: 0 | Status: SHIPPED | OUTPATIENT
Start: 2021-07-20 | End: 2021-08-18

## 2021-07-26 ENCOUNTER — TELEPHONE (OUTPATIENT)
Dept: PAIN MEDICINE | Facility: CLINIC | Age: 66
End: 2021-07-26

## 2021-07-26 DIAGNOSIS — R91.1 PULMONARY NODULE 1 CM OR GREATER IN DIAMETER: Primary | ICD-10-CM

## 2021-07-26 NOTE — TELEPHONE ENCOUNTER
[facility] called in stating significant findings on patients CT- results are in epic   Thank you        [phone]

## 2021-08-03 ENCOUNTER — HOSPITAL ENCOUNTER (OUTPATIENT)
Facility: HOSPITAL | Age: 66
Setting detail: OUTPATIENT SURGERY
Discharge: HOME | End: 2021-08-03
Attending: ANESTHESIOLOGY | Admitting: ANESTHESIOLOGY
Payer: COMMERCIAL

## 2021-08-03 ENCOUNTER — APPOINTMENT (OUTPATIENT)
Dept: RADIOLOGY | Facility: HOSPITAL | Age: 66
End: 2021-08-03
Payer: COMMERCIAL

## 2021-08-03 VITALS
TEMPERATURE: 98 F | RESPIRATION RATE: 20 BRPM | HEART RATE: 65 BPM | SYSTOLIC BLOOD PRESSURE: 150 MMHG | DIASTOLIC BLOOD PRESSURE: 88 MMHG | OXYGEN SATURATION: 98 %

## 2021-08-03 PROCEDURE — 76000 FLUOROSCOPY <1 HR PHYS/QHP: CPT

## 2021-08-03 PROCEDURE — 64483 NJX AA&/STRD TFRM EPI L/S 1: CPT | Performed by: ANESTHESIOLOGY

## 2021-08-03 RX ORDER — LIDOCAINE HYDROCHLORIDE 10 MG/ML
INJECTION, SOLUTION EPIDURAL; INFILTRATION; INTRACAUDAL; PERINEURAL AS NEEDED
Status: DISCONTINUED | OUTPATIENT
Start: 2021-08-03 | End: 2021-08-03 | Stop reason: HOSPADM

## 2021-08-03 RX ORDER — ALPRAZOLAM 0.5 MG/1
0.5 TABLET ORAL ONCE
Status: COMPLETED | OUTPATIENT
Start: 2021-08-03 | End: 2021-08-03

## 2021-08-03 RX ORDER — BETAMETHASONE SODIUM PHOSPHATE AND BETAMETHASONE ACETATE 3; 3 MG/ML; MG/ML
INJECTION, SUSPENSION INTRA-ARTICULAR; INTRALESIONAL; INTRAMUSCULAR; SOFT TISSUE AS NEEDED
Status: DISCONTINUED | OUTPATIENT
Start: 2021-08-03 | End: 2021-08-03 | Stop reason: HOSPADM

## 2021-08-03 RX ADMIN — ALPRAZOLAM 0.5 MG: 0.5 TABLET ORAL at 10:52

## 2021-08-03 NOTE — DISCHARGE INSTRUCTIONS
PLEASE SCHEDULE 2 WEEK FOLLOW UP BY CALLING THE [facility] AT [place]: [phone]      Epidural Steroid Injection   AMBULATORY CARE:   What you need to know about an epidural steroid injection (LARRY):  An LARRY is a procedure to inject steroid medicine into the epidural space  The epidural space is between your spinal cord and vertebrae  Steroids reduce inflammation and fluid buildup in your spine that may be causing pain  You may be given pain medicine along with the steroids  How to prepare for an LARRY:  Your healthcare provider will talk to you about how to prepare for your procedure  He or she will tell you what medicines to take or not take on the day of your procedure  You may need to stop taking blood thinners or other medicines several days before your procedure  You may need to adjust any diabetes medicine you take on the day of your procedure  Steroid medicine can increase your blood sugar level  Arrange for someone to drive you home when you are discharged  What will happen during an LARRY:   · You will be given medicine to numb the procedure area  You will be awake for the procedure, but you will not feel pain  You may also be given medicine to help you relax  Contrast liquid will be used to help your healthcare provider see the area better  Tell the healthcare provider if you have ever had an allergic reaction to contrast liquid  · Your healthcare provider may place the needle into your neck area, middle of your back, or tailbone area  He may inject the medicine next to the nerves that are causing your pain  He may instead inject the medicine into a larger area of the epidural space  This helps the medicine spread to more nerves  Your healthcare provider will use a fluoroscope to help guide the needle to the right place  A fluoroscope is a type of x-ray   After the procedure, a bandage will be placed over the injection site to prevent infection  What will happen after an LARRY:  You will have a bandage over the injection site to prevent infection  Your healthcare provider will tell you when you can bathe and any activity guidelines  You will be able to go home  Risks of an LARRY:  You may have temporary or permanent nerve damage or paralysis  You may have bleeding or develop a serious infection, such as meningitis (swelling of the brain coverings)  An abscess may also develop  An abscess is a pus-filled area under the skin  You may need surgery to fix the abscess  You may have a seizure, anxiety, or trouble sleeping  If you are a man, you may have temporary erectile dysfunction (not able to have an erection)  Call your local emergency number (911 in the [address] [facility]) if:   · You have a seizure  · You have trouble moving your legs  Seek care immediately if:   · Blood soaks through your bandage  · You have a fever or chills, severe back pain, and the procedure area is sensitive to the touch  · You cannot control when you urinate or have a bowel movement  Call your doctor if:   · You have weakness or numbness in your legs  · Your wound is red, swollen, or draining pus  · You have nausea or are vomiting  · Your face or neck is red and you feel warm  · You have more pain than you had before the procedure  · You have swelling in your hands or feet  · You have questions or concerns about your condition or care  Care for your wound as directed: You may remove the bandage before you go to bed the day of your procedure  You may take a shower, but do not take a bath for at least 24 hours  Self-care:   · Do not drive,  use machines, or do strenuous activity for 24 hours after your procedure or as directed  · Continue other treatments  as directed  Steroid injections alone will not control your pain  The injections are meant to be used with other treatments, such as physical therapy      Follow up with your doctor as directed:  Write down your questions so you remember to ask them during your visits  © Copyright [employer] 2021 Information is for End User's use only and may not be sold, redistributed or otherwise used for commercial purposes  All illustrations and images included in CareNotes® are the copyrighted property of A [employer] A M [employer] Inc  or Phyllis Marx  The above information is an  only  It is not intended as medical advice for individual conditions or treatments  Talk to your doctor, nurse or pharmacist before following any medical regimen to see if it is safe and effective for you

## 2021-08-03 NOTE — OP NOTE
OPERATIVE REPORT  PATIENT NAME: Naveed Jackson    :  1955  MRN: [id number]  Pt Location:  GI ROOM 01    SURGERY DATE: 8/3/2021    Surgeon(s) and Role:      Tylor Gatica MD - Primary    Preop Diagnosis:  Sacral insufficiency fracture with delayed healing, subsequent encounter [M84 48XG]  Lumbar radiculopathy    Post-Op Diagnosis Codes:     * Sacral insufficiency fracture with delayed healing, subsequent encounter [M84 48XG]  Lumbar radiculopathy    Procedure(s) (LRB):  S1 TRANSFORAMINAL EPIDURAL STEROID INJECTION (Left)    Specimen(s):  * No specimens in log *    Estimated Blood Loss:   Minimal    Drains:  * No LDAs found *    Anesthesia Type:   Local    Operative Indications:  Sacral insufficiency fracture with delayed healing, subsequent encounter [M84 48XG]  Lumbar radiculopathy    Operative Findings:  Left S1 nerve root identified under fluoroscopic guidance with contrast     Complications:   None    Procedure and Technique:  Please see detailed procedure note     I was present for the entire procedure    Patient Disposition:  PACU     SIGNATURE: Leticia Chaidez MD  DATE: August 3, 2021  TIME: 11:24 AM

## 2021-08-03 NOTE — INTERVAL H&P NOTE
H&P reviewed  After examining the patient I find no changes in the patients condition since the H&P had been written      Vitals:    08/03/21 1051   BP: 125/84   Pulse:    Resp:    Temp:    SpO2:

## 2021-08-03 NOTE — PROCEDURES
Pre-procedure Diagnosis: Lumbar radiculopathy/sacral fracture  Post-procedure Diagnosis: Lumbar radiculopathy/sacral fracture  Procedure Title(s):  [LEFT S1 TRANSFORAMINAL EPIDURAL STEROID INJECTION]  Attending Surgeon:   Ramone Harrison MD  Anesthesia:   Local     Indications: The patient is a 77y o  year-old male with a diagnosis of Lumbar radiculopathy/sacral fracture  The patient's history and physical exam were reviewed  The risks, benefits and alternatives to the procedure were discussed, and all questions were answered to the patient's satisfaction  The patient agreed to proceed, and written informed consent was obtained  Procedure in Detail: The patient was brought into the procedure room and placed in the prone position on the fluoroscopy table  The area of the lumbar spine was prepped with chloraprep solution  then draped in a sterile manner  The [LEFT]  S1 foramen was identified and the 2 o'clock position was marked  The skin and subcutaneous tissues in the area were anesthetized with 1% lidocaine  A 22-gauge,  [3 5] inch needle was directed toward the targeted point under fluoroscopy until bone was contacted  The needle was then walked inferiorly until the neural foramen was entered  A lateral fluoroscopic view was then used to place the needle tip in the middle of the foramen  Negative aspiration was confirmed, and 1 ml Omnipaque 240 was injected  Appropriate neurograms were observed under AP fluoroscopy  Digital subtraction angiography was performed showing no vascular uptake and appropriate spread in the epidural space  Then, after negative aspiration, a solution consisting of [0 5mL] normal saline and [4mL] betamethasone (6mg/mL) was easily injected  The needles were removed with a 1% lidocaine flush  The patient's back was cleaned and a bandage was placed over the needle insertion points       Disposition: The patient tolerated the procedure well, and there were no apparent complications  Strength at baseline when examined in post-op area  The patient was taken to the recovery area where written discharge instructions for the procedure were given        Estimated Blood Loss: None  Specimens Obtained: N/A

## 2021-08-10 ENCOUNTER — TELEPHONE (OUTPATIENT)
Dept: PAIN MEDICINE | Facility: CLINIC | Age: 66
End: 2021-08-10

## 2021-08-12 DIAGNOSIS — G89.4 CHRONIC PAIN SYNDROME: ICD-10-CM

## 2021-08-12 DIAGNOSIS — M47.816 LUMBAR SPONDYLOSIS: ICD-10-CM

## 2021-08-12 RX ORDER — OXYCODONE AND ACETAMINOPHEN 10; 325 MG/1; MG/1
1 TABLET ORAL EVERY 6 HOURS PRN
Qty: 120 TABLET | Refills: 0 | Status: SHIPPED | OUTPATIENT
Start: 2021-08-12 | End: 2021-09-09 | Stop reason: SDUPTHER

## 2021-08-12 NOTE — TELEPHONE ENCOUNTER
Patient states that he feels the pain is worse when only laying down. Patient states that the pain is only on the left side. Patient describes the pain as a stabbing pain.    Pain scale - 6/10      20% relief

## 2021-08-18 ENCOUNTER — OFFICE VISIT (OUTPATIENT)
Dept: PAIN MEDICINE | Facility: CLINIC | Age: 66
End: 2021-08-18
Payer: COMMERCIAL

## 2021-08-18 VITALS
BODY MASS INDEX: 20.05 KG/M2 | WEIGHT: 148 LBS | DIASTOLIC BLOOD PRESSURE: 90 MMHG | SYSTOLIC BLOOD PRESSURE: 142 MMHG | HEIGHT: 72 IN | TEMPERATURE: 97.3 F | RESPIRATION RATE: 20 BRPM | HEART RATE: 65 BPM

## 2021-08-18 DIAGNOSIS — M54.16 LUMBAR RADICULOPATHY: Primary | ICD-10-CM

## 2021-08-18 PROCEDURE — 3080F DIAST BP >= 90 MM HG: CPT | Performed by: ANESTHESIOLOGY

## 2021-08-18 PROCEDURE — 4004F PT TOBACCO SCREEN RCVD TLK: CPT | Performed by: ANESTHESIOLOGY

## 2021-08-18 PROCEDURE — 3077F SYST BP >= 140 MM HG: CPT | Performed by: ANESTHESIOLOGY

## 2021-08-18 PROCEDURE — 3008F BODY MASS INDEX DOCD: CPT | Performed by: FAMILY MEDICINE

## 2021-08-18 PROCEDURE — 1160F RVW MEDS BY RX/DR IN RCRD: CPT | Performed by: ANESTHESIOLOGY

## 2021-08-18 PROCEDURE — 99214 OFFICE O/P EST MOD 30 MIN: CPT | Performed by: ANESTHESIOLOGY

## 2021-08-18 PROCEDURE — 3008F BODY MASS INDEX DOCD: CPT | Performed by: ANESTHESIOLOGY

## 2021-08-18 NOTE — PATIENT INSTRUCTIONS
Core Strengthening Exercises   WHAT YOU NEED TO KNOW:   Your core includes the muscles of your lower back, hip, pelvis, and abdomen  Core strengthening exercises help heal and strengthen these muscles  This helps prevent another injury, and keeps your pelvis, spine, and hips in the correct position  DISCHARGE INSTRUCTIONS:   Contact your healthcare provider if:   · You have sharp or worsening pain during exercise or at rest     · You have questions or concerns about your shoulder exercises  Safety tips:  Talk to your healthcare provider before you start an exercise program  A physical therapist can teach you how to do core strengthening exercises safely  · Do the exercises on a mat or firm surface  A firm surface will support your spine and prevent low back pain  Do not do these exercises on a bed  · Move slowly and smoothly  Avoid fast or jerky motions  · Stop if you feel pain  Core exercises should not be painful  Stop if you feel pain  · Breathe normally during core exercises  Do not hold your breath  This may cause an increase in blood pressure and prevent muscle strengthening  Your healthcare provider will tell you when to inhale and exhale during the exercise  · Begin all of your exercises with abdominal bracing  Abdominal bracing helps warm up your core muscles  You can also practice abdominal bracing throughout the day  Lie on your back with your knees bent and feet flat on the floor  Place your arms in a relaxed position beside your body  Tighten your abdominal muscles  Pull your belly button in and up toward your spine  Hold for 5 seconds  Relax your muscles  Repeat 10 times  Core strengthening exercises: Your healthcare provider will tell you how often to do these exercises  The provider will also tell you how many repetitions of each exercise you should do  Hold each exercise for 5 seconds or as directed  As you get stronger, increase your hold to 10 to 15 seconds   You can do some of these exercises on a stability ball, or with a weight  Ask your healthcare provider how to use a stability ball or weight for these exercises:  · Bridging:  Lie on your back with your knees bent and feet flat on the floor  Rest your arms at your side  Tighten your buttocks, and then lift your hips 1 inch off the floor  Hold for 5 seconds  When you can do this exercise without pain for 10 seconds, increase the distance you lift your hips  A good goal is to be able to lift your hips so that your shoulders, hips, and knees are in a straight line  · Dead bug:  Lie on your back with your knees bent and feet flat on the floor  Place your arms in a relaxed position beside your body  Begin with abdominal bracing  Next, raise one leg, keeping your knee bent  Hold for 5 seconds  Repeat with the other leg  When you can do this exercise without pain for 10 to 15 seconds, you may raise one straight leg and hold  Repeat with the other leg  · Quadruped:  Place your hands and knees on the floor  Keep your wrists directly below your shoulders and your knees directly below your hips  Pull your belly button in toward your spine  Do not flatten or arch your back  Tighten your abdominal muscles below your belly button  Hold for 5 seconds  When you can do this exercise without pain for 10 to 15 seconds, you may extend one arm and hold  Repeat on the other side  · Side bridge exercises:      ? Standing side bridge:  Stand next to a wall and extend one arm toward the wall  Place your palm flat on the wall with your fingers pointing upward  Begin with abdominal bracing  Next, without moving your feet, slowly bend your arm to 90 degrees  Hold for 5 seconds  Repeat on the other side  When you can do this exercise without pain for 10 to 15 seconds, you may do the bent leg side bridge on the floor  ? Bent leg side bridge:  Lie on one side with your legs, hips, and shoulders in a straight line   Prop yourself up onto your forearm so your elbow is directly below your shoulder  Bend your knees back to 90 degrees  Begin with abdominal bracing  Next, lift your hips and balance yourself on your forearm and knees  Hold for 5 seconds  Repeat on the other side  When you can do this exercise without pain for 10 to 15 seconds, you may do the straight leg side bridge on the floor  ? Straight leg side bridge:  Lie on one side with your legs, hips, and shoulders in a straight line  Prop yourself up onto your forearm so your elbow is directly below your shoulder  Begin with abdominal bracing  Lift your hips off the floor and balance yourself on your forearm and the outside of your flexed foot  Do not let your ankle bend sideways  Hold for 5 seconds  Repeat on the other side  When you can do this exercise without pain for 10 to 15 seconds, ask your healthcare provider for more advanced exercises  · Superman:  Lie on your stomach  Extend your arms forward on the floor  Tighten your abdominal muscles and lift your right hand and left leg off the floor  Hold this position  Slowly return to the starting position  Tighten your abdominal muscles and lift your left hand and right leg off the floor  Hold this position  Slowly return to the starting position  · Clam:  Lie on your side with your knees bent  Put your bottom arm under your head to keep your neck in line  Put your top hand on your hip to keep your pelvis from moving  Put your heels together, and keep them together during this exercise  Slowly raise your top knee toward the ceiling  Then lower your leg so your knees are together  Repeat this exercise 10 times  Then switch sides and do the exercise 10 times with the other leg  · Curl up:  Lie on your back with your knees bent and feet flat on the floor  Place your hands, palms down, underneath your lower back   Next, with your elbows on the floor, lift your shoulders and chest 2 to 3 inches off the floor  Keep your head in line with your shoulders  Hold this position  Slowly return to the starting position  · Straight leg raises:  Lie on your back with one leg straight  Bend the other knee and place your foot flat on the floor  Tighten your abdominal muscles  Keep your leg straight and slowly lift it straight up 6 to 12 inches off the floor  Hold this position  Lower your leg slowly  Do as many repetitions as directed on this side  Repeat with the other leg  · Plank:  Lie on your stomach  Bend your elbows and place your forearms flat on the floor  Lift your chest, stomach, and knees off the floor  Make sure your elbows are below your shoulders  Your body should be in a straight line  Do not let your hips or lower back sink to the ground  Squeeze your abdominal muscles together and hold for 15 seconds  To make this exercise harder, hold for 30 seconds or lift 1 leg at a time  · Bicycles:  Lie on your back  Bend both knees and bring them toward your chest  Your calves should be parallel to the floor  Place the palms of your hands on the back of your head  Straighten your right leg and keep it lifted 2 inches off the floor  Raise your head and shoulders off the floor and twist towards your left  Keep your head and shoulders lifted  Bend your right knee while you straighten your left leg  Keep your left leg 2 inches off the floor  Twist your head and chest towards the left leg  Continue to straighten 1 leg at a time and twist        Follow up with your healthcare provider as directed:  Write down your questions so you remember to ask them during your visits  © Copyright [employer] 2021 Information is for End User's use only and may not be sold, redistributed or otherwise used for commercial purposes  All illustrations and images included in CareNotes® are the copyrighted property of A D A M , Inc  or [facility] Katherin Jackson   The above information is an  only   It is not intended as medical advice for individual conditions or treatments  Talk to your doctor, nurse or pharmacist before following any medical regimen to see if it is safe and effective for you

## 2021-08-18 NOTE — PROGRESS NOTES
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  Ligamentum flavum infolding at L3-4 and L4-5 as well  Patient describes pain in left S1 dermatomal distribution accompanied by weakness numbness and paresthesias on occasion  Most of his pain seems to be centered around his left sacroiliac joint  Positive Juan Miguel's, positive Gaenslen's, positive SI joint loading left-sided  Tenderness to palpation over left sacroiliac joint  He had an  Epidural steroid injection in March of 2019 that did not provide significant benefit  He has been to physical therapy many times in the past without significant benefit  Additionally he is on opioid therapy  Will proceed with multimodal pain therapy plan in treating this patient's pain  While obtaining more recent imaging to guide therapy  CT bony pelvis reveals questionable fx near S1 foramen  Plan  -Caudal LARRY after patient completes Physical therapy course  -may continue analgesics as previously ordered; cautioned regarding concomitant benzodiazepines with opioids  -physical therapy for left-sided lumbar radiculopathy; Core exercises additionally provided for physician directed home PT that the patient plans to participate with for 1 hour, twice a week for the next 6 weeks  There are risks associated with opioid medications, including dependence, addiction and tolerance  The patient understands and agrees to use these medications only as prescribed   Potential side effects of the medications include, but are not limited to, constipation, drowsiness, addiction, impaired judgment and risk of fatal overdose if not taken as prescribed  The patient was warned against driving while taking sedation medications  Sharing medications is a felony  At this point in time, the patient is showing no signs of addiction, abuse, diversion or suicidal ideation  [place] Prescription Drug Monitoring Program report was reviewed and was appropriate     Complete risks and benefits including bleeding, infection, tissue reaction, nerve injury and allergic reaction were discussed  The approach was demonstrated using models and literature was provided  Verbal and written consent was obtained  My impressions and treatment recommendations were discussed in detail with the patient who verbalized understanding and had no further questions  Discharge instructions were provided  I personally saw and examined the patient and I agree with the above discussed plan of care  No orders of the defined types were placed in this encounter  History of Present Illness    No significant benefit after sacroiliac joint injection or recent S1 TFESI  She has pelvic insufficiency fractures noted recent imaging  CT bony pelvis is recommended for further characterization as a likely be impinging left S1  Will plan for possible caudal LARRY after PT    Julien Rashi is a 77 y o  male with a pmhx of smoking, copd with 1ppd smoking history, GERD, CAD on plavix presenting with chronic lumbar radicular pain in the left S1 dermatomal distribution  Additionally describes left sided low back pain with achy, nagging, indolent, crampy stabbing and throbbing features  Debilitating weakness numbness and paresthesias accompany the pain  The patient rates the pain at a 8/10 accompanied by electric shock-like shooting features and crampy burning pain in the aforementioned dermatomal distributions    The pain is worse in the mornings as well as the end of the day; exertion such as walking for long periods of time seems to exacerbate the pain  The patient can hardly walk more than a few blocks without having debilitating pain  He tries to maintain an active lifestyle and finds that the current degree of pain seems to compromises his efforts  The pain significantly impacts independent activities of daily living and contributes to significant disability  He has attempted many courses of physical therapy without significant benefit in the past   He has taken percocet as well as tylenol #4 with limited relief of the pain as well  He has had left L4 and L5 epidural steroid injections in the past with 10% benefit  He denies any bowel or bladder dysfunction/incontinence    I have personally reviewed and/or updated the patient's past medical history, past surgical history, family history, social history, current medications, allergies, and vital signs today  Review of Systems   Constitutional: Positive for activity change  HENT: Negative  Eyes: Negative  Respiratory: Negative  Cardiovascular: Negative  Gastrointestinal: Negative  Endocrine: Negative  Genitourinary: Negative  Musculoskeletal: Positive for arthralgias, back pain, gait problem and myalgias  Skin: Negative  Allergic/Immunologic: Negative  Neurological: Positive for weakness and numbness  Hematological: Negative  Psychiatric/Behavioral: Negative  All other systems reviewed and are negative        Patient Active Problem List   Diagnosis    Encounter for hepatitis C screening test for low risk patient    Pain    Anxiety    Benign essential hypertension    Chronic obstructive pulmonary disease ([facility] [address] )    Screening for neurological condition    Chronic low back pain    Early satiety    GERD (gastroesophageal reflux disease)    Fatigue    Headache    Incisional hernia    Insomnia    Left knee pain    Neck pain    Osteoarthritis of both knees    Other chest pain    Other instability, right knee    Pain in joint of left shoulder    Pain of left hip    Weight loss, non-intentional    Lumbar spondylosis    Bulging of lumbar intervertebral disc    Low back pain    Displacement of lumbar intervertebral disc without myelopathy    Screening for AAA (abdominal aortic aneurysm)    Medicare annual wellness visit, initial    Lumbar radiculopathy    Uncomplicated opioid dependence ([facility] [address] )    Abnormal CT scan of lung    Upper respiratory tract infection    Acute respiratory failure with hypoxia (HCC)    ASCVD (arteriosclerotic cardiovascular disease)       Past Medical History:   Diagnosis Date    Anxiety     Chronic pain disorder     back    COPD (chronic obstructive pulmonary disease) (HCC) na    Disease of thyroid gland     GERD (gastroesophageal reflux disease)     Headache(784 0)     Hyperlipidemia     Hypertension     Hyperthyroidism     last assessed: 10/28/16    NSTEMI (non-ST elevated myocardial infarction) ([facility] [address] ) 4/13/2020    Pneumonia     Visual impairment        Past Surgical History:   Procedure Laterality Date    CARDIAC CATHETERIZATION  07/02/2020    EF 65% normal    COLONOSCOPY      EPIDURAL BLOCK INJECTION N/A 1/17/2019    Procedure: L5 S1 Lumbar Epidural Steroid Injection (79678);   Surgeon: Natalie Garcia MD;  Location: [facility];  Service: Pain Management     EPIDURAL BLOCK INJECTION Left 3/6/2019    Procedure: L4- L5 and L5-S1 transforaminal epidural steroid injection;  Surgeon: Natalie Garcia MD;  Location: [facility];  Service: Pain Management     EPIDURAL BLOCK INJECTION Left 8/3/2021    Procedure: S1 TRANSFORAMINAL EPIDURAL STEROID INJECTION;  Surgeon: Amarilys Andres MD;  Location:  ENDO;  Service: Pain Management     FL GUIDED NEEDLE PLAC BX/ASP/INJ  3/6/2019    INGUINAL HERNIA REPAIR Bilateral     MO INJECTION,SACROILIAC JOINT Left 6/24/2021    Procedure: BLOCK / INJECTION SACROILIAC;  Surgeon: Amarilys Andres MD;  [facility];  Service: Pain Management Family History   Problem Relation Age of Onset    Heart disease Mother     Heart disease Father         cardiac disorder    Heart disease Maternal Grandmother         cardiac disorder    Heart disease Maternal Grandfather         cardiac disorder    Heart disease Paternal Grandmother         cardiac disorder    Heart disease Paternal Grandfather         cardiac disorder       Social History     Occupational History    Not on file   Tobacco Use    Smoking status: Current Every Day Smoker     Packs/day: 1 00     Years: 15 00     Pack years: 15 00     Types: Cigarettes    Smokeless tobacco: Never Used   Vaping Use    Vaping Use: Never used   Substance and Sexual Activity    Alcohol use: No    Drug use: No    Sexual activity: Not Currently       Current Outpatient Medications on File Prior to Visit   Medication Sig    albuterol (2 5 mg/3 mL) 0 083 % nebulizer solution inhale contents of 1 vial ( 3 milliliters ) in nebulizer by mouth every 4 hours    albuterol (PROVENTIL HFA,VENTOLIN HFA) 90 mcg/act inhaler Inhale 2 puffs every 6 (six) hours as needed for wheezing    ALPRAZolam (XANAX) 0 25 mg tablet take 1 tablet by mouth three times a day if needed for ANXIETY    aspirin (Aspirin 81) 81 mg EC tablet Take 1 tablet (81 mg total) by mouth daily    atorvastatin (LIPITOR) 80 mg tablet Take 1 tablet (80 mg total) by mouth every evening    clopidogrel (PLAVIX) 75 mg tablet take 1 tablet by mouth once daily    Diclofenac Sodium (VOLTAREN) 1 % Apply 4 g topically 4 (four) times a day    DULoxetine (CYMBALTA) 60 mg delayed release capsule take 1 capsule by mouth once daily    metoprolol succinate (TOPROL-XL) 25 mg 24 hr tablet take 1 tablet by mouth once daily    naloxone (NARCAN) 4 mg/0 1 mL nasal spray Administer 1 spray into a nostril  If no response after 2-3 minutes, give another dose in the other nostril using a new spray      oxyCODONE-acetaminophen (PERCOCET)  mg per tablet Take 1 tablet by mouth every 6 (six) hours as needed for moderate painMax Daily Amount: 4 tablets    pantoprazole (PROTONIX) 40 mg tablet Take 1 tablet (40 mg total) by mouth daily    Trelegy Ellipta 100-62 5-25 MCG/INH inhaler INHALE 1 PUFF DAILY RINSE MOUTH AFTER USE    zolpidem (AMBIEN) 10 mg tablet Take 1 tablet (10 mg total) by mouth daily at bedtime as needed for sleep    [DISCONTINUED] acetaminophen-codeine (TYLENOL #4) 300-60 MG per tablet Take 1 tablet by mouth every 6 (six) hours as needed for moderate pain     No current facility-administered medications on file prior to visit  No Known Allergies      Physical Exam    /90   Pulse 65   Temp (!) 97 3 °F (36 3 °C)   Resp 20   Ht 6' (1 829 m)   Wt 67 1 kg (148 lb)   BMI 20 07 kg/m²     Constitutional: normal, well developed, well nourished, alert, in no distress and non-toxic and no overt pain behavior  Eyes: anicteric  HEENT: grossly intact  Neck: supple, symmetric, trachea midline and no masses   Pulmonary:even and unlabored  Cardiovascular:No edema or pitting edema present  Skin:Normal without rashes or lesions and well hydrated  Psychiatric:Mood and affect appropriate  Neurologic:Cranial Nerves II-XII grossly intact Sensation grossly intact; no clonus negative perez's  Reflexes 2+ and brisk  SLR positive bilaterally  Musculoskeletal:normal gait  4/5 strength with ankle dorsiflexion bilaterally; otherwise 5/5 strength in all extremities with active range of motion movements bilaterally  Normal heel toe and tip toe walking  Pain with lumbar facet loading bilaterally and with lateral spine rotation  ttp over lumbar paraspinal muscles  Positive bradley's test, positive gaenslen's positive SIJ loading left sided      Imaging    MRI LUMBAR SPINE WITHOUT CONTRAST     INDICATION: Chronic low back pain refractory to conservative management      COMPARISON:  Lumbar spine radiographs dated 11/2/2018      TECHNIQUE:  Sagittal T1, sagittal T2, sagittal inversion recovery, axial T1 and axial T2, coronal T2        IMAGE QUALITY:  Diagnostic     FINDINGS:     ALIGNMENT:  Straightening of the lumbar lordosis without listhesis or scoliosis      MARROW SIGNAL:  Normal marrow signal is identified within the visualized bony structures  No discrete marrow lesion      DISTAL CORD AND CONUS:  Normal size and signal within the distal cord and conus  The conus terminates appropriately at the mid L1 level      PARASPINAL SOFT TISSUES:  Paraspinal soft tissues are unremarkable      SACRUM:  Limited evaluation is unremarkable      LOWER THORACIC DISC SPACES:  Normal disc height and signal   No disc herniation, canal stenosis or foraminal narrowing      LUMBAR DISC SPACES:     L1-L2:  No significant spondylosis      L2-L3:  No significant spondylosis      L3-L4:  Tiny disc bulge  Mild facet arthropathy and ligamentum flavum thickening  Mild flattening of ventral thecal sac  Mild bilateral foraminal stenoses      L4-L5:  Small disc bulge  Facet arthropathy and ligamentum flavum thickening  Flattening of the ventral thecal sac  Mild left lateral recess stenosis  No significant foraminal stenoses      L5-S1:  Mild facet hypertrophy and ligamentum flavum thickening  Mild posterior foraminal stenoses      IMPRESSION:     1    Chronic straightening of the lumbar lordosis suggests muscle spasm      2   Mild, age-appropriate spondylosis without significant neural impingement

## 2021-09-03 DIAGNOSIS — J44.9 CHRONIC OBSTRUCTIVE PULMONARY DISEASE, UNSPECIFIED COPD TYPE (HCC): ICD-10-CM

## 2021-09-03 RX ORDER — ALBUTEROL SULFATE 90 UG/1
AEROSOL, METERED RESPIRATORY (INHALATION)
Qty: 18 G | Refills: 5 | Status: SHIPPED | OUTPATIENT
Start: 2021-09-03 | End: 2021-11-15 | Stop reason: SDUPTHER

## 2021-09-03 NOTE — TELEPHONE ENCOUNTER
Requested medication(s) are due for refill today: Yes  Patient has already received a courtesy refill: No  Other reason request has been forwarded to provider: Repair Type: Complex Repair

## 2021-09-07 DIAGNOSIS — F41.9 ANXIETY: ICD-10-CM

## 2021-09-07 RX ORDER — ALPRAZOLAM 0.25 MG/1
0.25 TABLET ORAL 3 TIMES DAILY PRN
Qty: 90 TABLET | Refills: 0 | Status: SHIPPED | OUTPATIENT
Start: 2021-09-07 | End: 2021-10-07 | Stop reason: SDUPTHER

## 2021-09-08 DIAGNOSIS — G47.00 INSOMNIA, UNSPECIFIED TYPE: ICD-10-CM

## 2021-09-08 RX ORDER — ASPIRIN 81 MG/1
TABLET, COATED ORAL
Qty: 90 TABLET | Refills: 0 | Status: SHIPPED | OUTPATIENT
Start: 2021-09-08 | End: 2021-12-07

## 2021-09-09 DIAGNOSIS — M47.816 LUMBAR SPONDYLOSIS: ICD-10-CM

## 2021-09-09 DIAGNOSIS — G89.4 CHRONIC PAIN SYNDROME: ICD-10-CM

## 2021-09-09 RX ORDER — OXYCODONE AND ACETAMINOPHEN 10; 325 MG/1; MG/1
1 TABLET ORAL EVERY 6 HOURS PRN
Qty: 120 TABLET | Refills: 0 | Status: SHIPPED | OUTPATIENT
Start: 2021-09-09 | End: 2021-10-07 | Stop reason: SDUPTHER

## 2021-10-07 DIAGNOSIS — G89.4 CHRONIC PAIN SYNDROME: ICD-10-CM

## 2021-10-07 DIAGNOSIS — F41.9 ANXIETY: ICD-10-CM

## 2021-10-07 DIAGNOSIS — M47.816 LUMBAR SPONDYLOSIS: ICD-10-CM

## 2021-10-07 RX ORDER — ALPRAZOLAM 0.25 MG/1
0.25 TABLET ORAL 3 TIMES DAILY PRN
Qty: 90 TABLET | Refills: 0 | Status: SHIPPED | OUTPATIENT
Start: 2021-10-07 | End: 2021-11-05 | Stop reason: SDUPTHER

## 2021-10-07 RX ORDER — OXYCODONE AND ACETAMINOPHEN 10; 325 MG/1; MG/1
1 TABLET ORAL EVERY 6 HOURS PRN
Qty: 120 TABLET | Refills: 0 | Status: SHIPPED | OUTPATIENT
Start: 2021-10-07 | End: 2021-11-03 | Stop reason: SDUPTHER

## 2021-10-28 ENCOUNTER — OFFICE VISIT (OUTPATIENT)
Dept: URGENT CARE | Facility: CLINIC | Age: 66
End: 2021-10-28
Payer: COMMERCIAL

## 2021-10-28 VITALS
DIASTOLIC BLOOD PRESSURE: 80 MMHG | HEIGHT: 72 IN | OXYGEN SATURATION: 99 % | RESPIRATION RATE: 20 BRPM | BODY MASS INDEX: 20.05 KG/M2 | SYSTOLIC BLOOD PRESSURE: 144 MMHG | WEIGHT: 148 LBS | HEART RATE: 75 BPM | TEMPERATURE: 98 F

## 2021-10-28 DIAGNOSIS — S62.102A CLOSED FRACTURE OF LEFT WRIST, INITIAL ENCOUNTER: Primary | ICD-10-CM

## 2021-10-28 PROCEDURE — 29125 APPL SHORT ARM SPLINT STATIC: CPT | Performed by: PHYSICIAN ASSISTANT

## 2021-10-28 PROCEDURE — G0463 HOSPITAL OUTPT CLINIC VISIT: HCPCS | Performed by: PHYSICIAN ASSISTANT

## 2021-10-28 PROCEDURE — 99202 OFFICE O/P NEW SF 15 MIN: CPT | Performed by: PHYSICIAN ASSISTANT

## 2021-10-30 ENCOUNTER — NURSE TRIAGE (OUTPATIENT)
Dept: OTHER | Facility: OTHER | Age: 66
End: 2021-10-30

## 2021-11-01 ENCOUNTER — TELEPHONE (OUTPATIENT)
Dept: FAMILY MEDICINE CLINIC | Facility: CLINIC | Age: 66
End: 2021-11-01

## 2021-11-02 ENCOUNTER — OFFICE VISIT (OUTPATIENT)
Dept: FAMILY MEDICINE CLINIC | Facility: CLINIC | Age: 66
End: 2021-11-02
Payer: COMMERCIAL

## 2021-11-02 VITALS
HEIGHT: 72 IN | HEART RATE: 80 BPM | RESPIRATION RATE: 18 BRPM | OXYGEN SATURATION: 98 % | SYSTOLIC BLOOD PRESSURE: 140 MMHG | WEIGHT: 136.8 LBS | BODY MASS INDEX: 18.53 KG/M2 | DIASTOLIC BLOOD PRESSURE: 78 MMHG | TEMPERATURE: 98.9 F

## 2021-11-02 DIAGNOSIS — E87.1 HYPONATREMIA: ICD-10-CM

## 2021-11-02 DIAGNOSIS — R91.8 ABNORMAL CT SCAN OF LUNG: ICD-10-CM

## 2021-11-02 DIAGNOSIS — Z01.818 PRE-OP EXAMINATION: Primary | ICD-10-CM

## 2021-11-02 DIAGNOSIS — Z72.0 TOBACCO USE: ICD-10-CM

## 2021-11-02 DIAGNOSIS — Z79.899 CONTROLLED SUBSTANCE AGREEMENT SIGNED: ICD-10-CM

## 2021-11-02 DIAGNOSIS — J43.1 PANLOBULAR EMPHYSEMA (HCC): ICD-10-CM

## 2021-11-02 PROCEDURE — 99215 OFFICE O/P EST HI 40 MIN: CPT | Performed by: FAMILY MEDICINE

## 2021-11-02 PROCEDURE — 3077F SYST BP >= 140 MM HG: CPT | Performed by: FAMILY MEDICINE

## 2021-11-02 PROCEDURE — 3078F DIAST BP <80 MM HG: CPT | Performed by: FAMILY MEDICINE

## 2021-11-02 PROCEDURE — 3008F BODY MASS INDEX DOCD: CPT | Performed by: FAMILY MEDICINE

## 2021-11-02 PROCEDURE — 1160F RVW MEDS BY RX/DR IN RCRD: CPT | Performed by: FAMILY MEDICINE

## 2021-11-02 PROCEDURE — 4004F PT TOBACCO SCREEN RCVD TLK: CPT | Performed by: FAMILY MEDICINE

## 2021-11-02 PROCEDURE — 80307 DRUG TEST PRSMV CHEM ANLYZR: CPT | Performed by: FAMILY MEDICINE

## 2021-11-03 DIAGNOSIS — G89.4 CHRONIC PAIN SYNDROME: ICD-10-CM

## 2021-11-03 DIAGNOSIS — M47.816 LUMBAR SPONDYLOSIS: ICD-10-CM

## 2021-11-03 RX ORDER — OXYCODONE AND ACETAMINOPHEN 10; 325 MG/1; MG/1
1 TABLET ORAL EVERY 6 HOURS PRN
Qty: 120 TABLET | Refills: 0 | Status: SHIPPED | OUTPATIENT
Start: 2021-11-03 | End: 2021-12-01 | Stop reason: SDUPTHER

## 2021-11-05 DIAGNOSIS — F41.9 ANXIETY: ICD-10-CM

## 2021-11-05 RX ORDER — ALPRAZOLAM 0.25 MG/1
0.25 TABLET ORAL 3 TIMES DAILY PRN
Qty: 90 TABLET | Refills: 0 | Status: SHIPPED | OUTPATIENT
Start: 2021-11-05 | End: 2021-11-15 | Stop reason: SDUPTHER

## 2021-11-08 ENCOUNTER — RA CDI HCC (OUTPATIENT)
Dept: OTHER | Facility: HOSPITAL | Age: 66
End: 2021-11-08

## 2021-11-08 LAB
AMPHETAMINES UR QL SCN: NEGATIVE NG/ML
BARBITURATES UR QL SCN: NEGATIVE NG/ML
BENZODIAZ UR QL: NEGATIVE
BZE UR QL: NEGATIVE NG/ML
CANNABINOIDS UR QL SCN: NEGATIVE NG/ML
METHADONE UR QL SCN: NEGATIVE NG/ML
OPIATES UR QL: NEGATIVE NG/ML
PCP UR QL: NEGATIVE NG/ML
PROPOXYPH UR QL SCN: NEGATIVE NG/ML

## 2021-11-15 DIAGNOSIS — J44.9 CHRONIC OBSTRUCTIVE PULMONARY DISEASE, UNSPECIFIED COPD TYPE (HCC): ICD-10-CM

## 2021-11-15 DIAGNOSIS — I10 BENIGN ESSENTIAL HYPERTENSION: ICD-10-CM

## 2021-11-15 DIAGNOSIS — F41.9 ANXIETY: ICD-10-CM

## 2021-11-15 DIAGNOSIS — G47.00 INSOMNIA, UNSPECIFIED TYPE: ICD-10-CM

## 2021-11-15 RX ORDER — ALBUTEROL SULFATE 2.5 MG/3ML
2.5 SOLUTION RESPIRATORY (INHALATION) EVERY 4 HOURS PRN
Qty: 300 ML | Refills: 5 | Status: SHIPPED | OUTPATIENT
Start: 2021-11-15 | End: 2022-05-01 | Stop reason: SDUPTHER

## 2021-11-15 RX ORDER — FLUTICASONE FUROATE, UMECLIDINIUM BROMIDE AND VILANTEROL TRIFENATATE 100; 62.5; 25 UG/1; UG/1; UG/1
1 POWDER RESPIRATORY (INHALATION) DAILY
Qty: 90 BLISTER | Refills: 3 | Status: SHIPPED | OUTPATIENT
Start: 2021-11-15 | End: 2021-12-09 | Stop reason: SDUPTHER

## 2021-11-15 RX ORDER — ZOLPIDEM TARTRATE 10 MG/1
10 TABLET ORAL
Qty: 90 TABLET | Refills: 3 | Status: SHIPPED | OUTPATIENT
Start: 2021-11-15 | End: 2021-12-15

## 2021-11-15 RX ORDER — ALPRAZOLAM 0.25 MG/1
0.25 TABLET ORAL 3 TIMES DAILY PRN
Qty: 90 TABLET | Refills: 0 | Status: SHIPPED | OUTPATIENT
Start: 2021-11-15 | End: 2021-12-07 | Stop reason: SDUPTHER

## 2021-11-15 RX ORDER — ALBUTEROL SULFATE 90 UG/1
2 AEROSOL, METERED RESPIRATORY (INHALATION) EVERY 4 HOURS PRN
Qty: 18 G | Refills: 3 | Status: SHIPPED | OUTPATIENT
Start: 2021-11-15 | End: 2022-02-15 | Stop reason: SDUPTHER

## 2021-11-15 RX ORDER — METOPROLOL SUCCINATE 25 MG/1
25 TABLET, EXTENDED RELEASE ORAL DAILY
Qty: 90 TABLET | Refills: 0 | Status: SHIPPED | OUTPATIENT
Start: 2021-11-15 | End: 2021-11-22

## 2021-11-20 DIAGNOSIS — G47.00 INSOMNIA, UNSPECIFIED TYPE: ICD-10-CM

## 2021-11-22 RX ORDER — CLOPIDOGREL BISULFATE 75 MG/1
TABLET ORAL
Qty: 90 TABLET | Refills: 0 | Status: SHIPPED | OUTPATIENT
Start: 2021-11-22 | End: 2022-02-24

## 2021-11-22 RX ORDER — METOPROLOL SUCCINATE 25 MG/1
TABLET, EXTENDED RELEASE ORAL
Qty: 90 TABLET | Refills: 0 | Status: SHIPPED | OUTPATIENT
Start: 2021-11-22 | End: 2022-02-07 | Stop reason: SDUPTHER

## 2021-12-01 DIAGNOSIS — G89.4 CHRONIC PAIN SYNDROME: ICD-10-CM

## 2021-12-01 DIAGNOSIS — M47.816 LUMBAR SPONDYLOSIS: ICD-10-CM

## 2021-12-01 RX ORDER — OXYCODONE AND ACETAMINOPHEN 10; 325 MG/1; MG/1
1 TABLET ORAL EVERY 6 HOURS PRN
Qty: 120 TABLET | Refills: 0 | Status: SHIPPED | OUTPATIENT
Start: 2021-12-01 | End: 2021-12-29 | Stop reason: SDUPTHER

## 2021-12-07 DIAGNOSIS — F41.9 ANXIETY: ICD-10-CM

## 2021-12-07 DIAGNOSIS — G47.00 INSOMNIA, UNSPECIFIED TYPE: ICD-10-CM

## 2021-12-07 RX ORDER — ALPRAZOLAM 0.25 MG/1
0.25 TABLET ORAL 3 TIMES DAILY PRN
Qty: 90 TABLET | Refills: 0 | Status: SHIPPED | OUTPATIENT
Start: 2021-12-07 | End: 2022-01-06 | Stop reason: SDUPTHER

## 2021-12-07 RX ORDER — ASPIRIN 81 MG/1
TABLET, COATED ORAL
Qty: 90 TABLET | Refills: 0 | Status: SHIPPED | OUTPATIENT
Start: 2021-12-07 | End: 2022-01-06 | Stop reason: SDUPTHER

## 2021-12-09 DIAGNOSIS — J44.9 CHRONIC OBSTRUCTIVE PULMONARY DISEASE, UNSPECIFIED COPD TYPE (HCC): ICD-10-CM

## 2021-12-09 RX ORDER — FLUTICASONE FUROATE, UMECLIDINIUM BROMIDE AND VILANTEROL TRIFENATATE 100; 62.5; 25 UG/1; UG/1; UG/1
1 POWDER RESPIRATORY (INHALATION) DAILY
Qty: 90 BLISTER | Refills: 0 | Status: SHIPPED | OUTPATIENT
Start: 2021-12-09 | End: 2022-01-10 | Stop reason: SDUPTHER

## 2021-12-12 DIAGNOSIS — R52 PAIN: ICD-10-CM

## 2021-12-13 RX ORDER — PANTOPRAZOLE SODIUM 40 MG/1
40 TABLET, DELAYED RELEASE ORAL DAILY
Qty: 90 TABLET | Refills: 0 | Status: SHIPPED | OUTPATIENT
Start: 2021-12-13 | End: 2021-12-16

## 2021-12-15 DIAGNOSIS — G47.00 INSOMNIA, UNSPECIFIED TYPE: ICD-10-CM

## 2021-12-15 RX ORDER — ZOLPIDEM TARTRATE 10 MG/1
TABLET ORAL
Qty: 30 TABLET | Refills: 5 | Status: SHIPPED | OUTPATIENT
Start: 2021-12-15 | End: 2022-02-15

## 2021-12-16 DIAGNOSIS — R52 PAIN: ICD-10-CM

## 2021-12-16 RX ORDER — PANTOPRAZOLE SODIUM 40 MG/1
TABLET, DELAYED RELEASE ORAL
Qty: 90 TABLET | Refills: 0 | Status: SHIPPED | OUTPATIENT
Start: 2021-12-16 | End: 2022-03-17 | Stop reason: SDUPTHER

## 2021-12-29 DIAGNOSIS — G89.4 CHRONIC PAIN SYNDROME: ICD-10-CM

## 2021-12-29 DIAGNOSIS — M47.816 LUMBAR SPONDYLOSIS: ICD-10-CM

## 2021-12-29 RX ORDER — OXYCODONE AND ACETAMINOPHEN 10; 325 MG/1; MG/1
1 TABLET ORAL EVERY 6 HOURS PRN
Qty: 120 TABLET | Refills: 0 | Status: SHIPPED | OUTPATIENT
Start: 2021-12-29 | End: 2022-01-03 | Stop reason: SDUPTHER

## 2022-01-03 DIAGNOSIS — G89.4 CHRONIC PAIN SYNDROME: ICD-10-CM

## 2022-01-03 DIAGNOSIS — M47.816 LUMBAR SPONDYLOSIS: ICD-10-CM

## 2022-01-03 RX ORDER — OXYCODONE AND ACETAMINOPHEN 10; 325 MG/1; MG/1
1 TABLET ORAL EVERY 6 HOURS PRN
Qty: 120 TABLET | Refills: 0 | Status: SHIPPED | OUTPATIENT
Start: 2022-01-03 | End: 2022-01-31 | Stop reason: SDUPTHER

## 2022-01-03 NOTE — TELEPHONE ENCOUNTER
Pharmacy called office states script from 12/29 is shown as cancelled on their system  She would need a new script sent to fill for pt

## 2022-01-06 DIAGNOSIS — G47.00 INSOMNIA, UNSPECIFIED TYPE: ICD-10-CM

## 2022-01-06 DIAGNOSIS — F41.9 ANXIETY: ICD-10-CM

## 2022-01-06 RX ORDER — ALPRAZOLAM 0.25 MG/1
0.25 TABLET ORAL 3 TIMES DAILY PRN
Qty: 90 TABLET | Refills: 0 | Status: SHIPPED | OUTPATIENT
Start: 2022-01-06 | End: 2022-02-07 | Stop reason: SDUPTHER

## 2022-01-06 RX ORDER — ASPIRIN 81 MG/1
81 TABLET ORAL DAILY
Qty: 90 TABLET | Refills: 0 | Status: SHIPPED | OUTPATIENT
Start: 2022-01-06 | End: 2022-04-11

## 2022-01-10 DIAGNOSIS — J44.9 CHRONIC OBSTRUCTIVE PULMONARY DISEASE, UNSPECIFIED COPD TYPE (HCC): ICD-10-CM

## 2022-01-11 RX ORDER — FLUTICASONE FUROATE, UMECLIDINIUM BROMIDE AND VILANTEROL TRIFENATATE 100; 62.5; 25 UG/1; UG/1; UG/1
1 POWDER RESPIRATORY (INHALATION) DAILY
Qty: 90 BLISTER | Refills: 0 | Status: SHIPPED | OUTPATIENT
Start: 2022-01-11 | End: 2022-02-15 | Stop reason: SDUPTHER

## 2022-01-30 DIAGNOSIS — R52 PAIN: ICD-10-CM

## 2022-01-31 DIAGNOSIS — G89.4 CHRONIC PAIN SYNDROME: ICD-10-CM

## 2022-01-31 DIAGNOSIS — M47.816 LUMBAR SPONDYLOSIS: ICD-10-CM

## 2022-01-31 RX ORDER — OXYCODONE AND ACETAMINOPHEN 10; 325 MG/1; MG/1
1 TABLET ORAL EVERY 6 HOURS PRN
Qty: 120 TABLET | Refills: 0 | Status: SHIPPED | OUTPATIENT
Start: 2022-01-31 | End: 2022-02-28 | Stop reason: SDUPTHER

## 2022-01-31 RX ORDER — DULOXETIN HYDROCHLORIDE 60 MG/1
CAPSULE, DELAYED RELEASE ORAL
Qty: 90 CAPSULE | Refills: 3 | Status: SHIPPED | OUTPATIENT
Start: 2022-01-31

## 2022-02-03 DIAGNOSIS — M47.816 LUMBAR SPONDYLOSIS: ICD-10-CM

## 2022-02-07 DIAGNOSIS — G47.00 INSOMNIA, UNSPECIFIED TYPE: ICD-10-CM

## 2022-02-07 DIAGNOSIS — F41.9 ANXIETY: ICD-10-CM

## 2022-02-07 RX ORDER — METOPROLOL SUCCINATE 25 MG/1
25 TABLET, EXTENDED RELEASE ORAL DAILY
Qty: 90 TABLET | Refills: 0 | Status: SHIPPED | OUTPATIENT
Start: 2022-02-07 | End: 2022-08-02 | Stop reason: SDUPTHER

## 2022-02-07 RX ORDER — ALPRAZOLAM 0.25 MG/1
0.25 TABLET ORAL 3 TIMES DAILY PRN
Qty: 90 TABLET | Refills: 0 | Status: SHIPPED | OUTPATIENT
Start: 2022-02-07 | End: 2022-03-04 | Stop reason: SDUPTHER

## 2022-02-10 ENCOUNTER — RA CDI HCC (OUTPATIENT)
Dept: OTHER | Facility: HOSPITAL | Age: 67
End: 2022-02-10

## 2022-02-10 NOTE — PROGRESS NOTES
[facility]  coding opportunities       Chart reviewed, no opportunity found: CHART REVIEWED, NO OPPORTUNITY FOUND                        Patients insurance company: [employer] only)

## 2022-02-11 DIAGNOSIS — G47.00 INSOMNIA, UNSPECIFIED TYPE: Primary | ICD-10-CM

## 2022-02-11 RX ORDER — ZOLPIDEM TARTRATE 5 MG/1
5 TABLET ORAL
Qty: 30 TABLET | Refills: 5 | Status: SHIPPED | OUTPATIENT
Start: 2022-02-11 | End: 2022-08-02 | Stop reason: SDUPTHER

## 2022-02-15 DIAGNOSIS — J44.9 CHRONIC OBSTRUCTIVE PULMONARY DISEASE, UNSPECIFIED COPD TYPE (HCC): ICD-10-CM

## 2022-02-15 RX ORDER — FLUTICASONE FUROATE, UMECLIDINIUM BROMIDE AND VILANTEROL TRIFENATATE 100; 62.5; 25 UG/1; UG/1; UG/1
1 POWDER RESPIRATORY (INHALATION) DAILY
Qty: 90 BLISTER | Refills: 0 | Status: SHIPPED | OUTPATIENT
Start: 2022-02-15 | End: 2022-03-17 | Stop reason: SDUPTHER

## 2022-02-15 RX ORDER — ALBUTEROL SULFATE 90 UG/1
2 AEROSOL, METERED RESPIRATORY (INHALATION) EVERY 4 HOURS PRN
Qty: 18 G | Refills: 0 | Status: SHIPPED | OUTPATIENT
Start: 2022-02-15 | End: 2022-03-04 | Stop reason: SDUPTHER

## 2022-02-24 DIAGNOSIS — G47.00 INSOMNIA, UNSPECIFIED TYPE: ICD-10-CM

## 2022-02-24 RX ORDER — CLOPIDOGREL BISULFATE 75 MG/1
TABLET ORAL
Qty: 90 TABLET | Refills: 0 | Status: SHIPPED | OUTPATIENT
Start: 2022-02-24 | End: 2022-05-26

## 2022-02-28 DIAGNOSIS — M47.816 LUMBAR SPONDYLOSIS: ICD-10-CM

## 2022-02-28 DIAGNOSIS — G89.4 CHRONIC PAIN SYNDROME: ICD-10-CM

## 2022-02-28 RX ORDER — OXYCODONE AND ACETAMINOPHEN 10; 325 MG/1; MG/1
1 TABLET ORAL EVERY 6 HOURS PRN
Qty: 120 TABLET | Refills: 0 | Status: SHIPPED | OUTPATIENT
Start: 2022-02-28 | End: 2022-03-28 | Stop reason: SDUPTHER

## 2022-03-04 ENCOUNTER — OFFICE VISIT (OUTPATIENT)
Dept: FAMILY MEDICINE CLINIC | Facility: CLINIC | Age: 67
End: 2022-03-04
Payer: COMMERCIAL

## 2022-03-04 VITALS
WEIGHT: 140 LBS | SYSTOLIC BLOOD PRESSURE: 142 MMHG | OXYGEN SATURATION: 98 % | HEART RATE: 84 BPM | HEIGHT: 72 IN | BODY MASS INDEX: 18.96 KG/M2 | TEMPERATURE: 98.4 F | DIASTOLIC BLOOD PRESSURE: 80 MMHG

## 2022-03-04 DIAGNOSIS — F11.20 UNCOMPLICATED OPIOID DEPENDENCE (HCC): ICD-10-CM

## 2022-03-04 DIAGNOSIS — M17.0 PRIMARY OSTEOARTHRITIS OF BOTH KNEES: ICD-10-CM

## 2022-03-04 DIAGNOSIS — R91.8 ABNORMAL CT SCAN OF LUNG: ICD-10-CM

## 2022-03-04 DIAGNOSIS — E44.0 MODERATE PROTEIN-CALORIE MALNUTRITION (HCC): ICD-10-CM

## 2022-03-04 DIAGNOSIS — I25.10 ASCVD (ARTERIOSCLEROTIC CARDIOVASCULAR DISEASE): ICD-10-CM

## 2022-03-04 DIAGNOSIS — J44.9 CHRONIC OBSTRUCTIVE PULMONARY DISEASE, UNSPECIFIED COPD TYPE (HCC): ICD-10-CM

## 2022-03-04 DIAGNOSIS — J43.9 PULMONARY EMPHYSEMA, UNSPECIFIED EMPHYSEMA TYPE (HCC): Primary | ICD-10-CM

## 2022-03-04 DIAGNOSIS — F41.9 ANXIETY: ICD-10-CM

## 2022-03-04 DIAGNOSIS — M46.1 SACROILIITIS, NOT ELSEWHERE CLASSIFIED (HCC): ICD-10-CM

## 2022-03-04 PROBLEM — J96.01 ACUTE RESPIRATORY FAILURE WITH HYPOXIA (HCC): Status: RESOLVED | Noted: 2021-01-15 | Resolved: 2022-03-04

## 2022-03-04 PROCEDURE — 3725F SCREEN DEPRESSION PERFORMED: CPT | Performed by: FAMILY MEDICINE

## 2022-03-04 PROCEDURE — 3008F BODY MASS INDEX DOCD: CPT | Performed by: FAMILY MEDICINE

## 2022-03-04 PROCEDURE — 1160F RVW MEDS BY RX/DR IN RCRD: CPT | Performed by: FAMILY MEDICINE

## 2022-03-04 PROCEDURE — 4004F PT TOBACCO SCREEN RCVD TLK: CPT | Performed by: FAMILY MEDICINE

## 2022-03-04 PROCEDURE — 99214 OFFICE O/P EST MOD 30 MIN: CPT | Performed by: FAMILY MEDICINE

## 2022-03-04 RX ORDER — ALPRAZOLAM 0.25 MG/1
0.25 TABLET ORAL 3 TIMES DAILY PRN
Qty: 90 TABLET | Refills: 5 | Status: SHIPPED | OUTPATIENT
Start: 2022-03-04

## 2022-03-04 RX ORDER — LOSARTAN POTASSIUM 25 MG/1
25 TABLET ORAL DAILY
Qty: 90 TABLET | Refills: 3 | Status: SHIPPED | OUTPATIENT
Start: 2022-03-04 | End: 2022-08-02 | Stop reason: SDUPTHER

## 2022-03-04 RX ORDER — ALBUTEROL SULFATE 90 UG/1
2 AEROSOL, METERED RESPIRATORY (INHALATION) EVERY 4 HOURS PRN
Qty: 18 G | Refills: 5 | Status: SHIPPED | OUTPATIENT
Start: 2022-03-04 | End: 2022-07-05 | Stop reason: SDUPTHER

## 2022-03-04 NOTE — PROGRESS NOTES
Assessment/Plan:    No problem-specific Assessment & Plan notes found for this encounter  Diagnoses and all orders for this visit:    Pulmonary emphysema, unspecified emphysema type ([place] [address] )  -     Complete PFT with post bronchodilator; Future  -     Lipid panel; Future  -     Comprehensive metabolic panel; Future  -     CBC and differential; Future  -     TSH, 3rd generation; Future    ASCVD (arteriosclerotic cardiovascular disease)  -     losartan (COZAAR) 25 mg tablet; Take 1 tablet (25 mg total) by mouth daily  -     Lipid panel; Future  -     Comprehensive metabolic panel; Future  -     CBC and differential; Future  -     TSH, 3rd generation; Future    Primary osteoarthritis of both knees  -     Lipid panel; Future  -     Comprehensive metabolic panel; Future  -     CBC and differential; Future  -     TSH, 3rd generation; Future    Uncomplicated opioid dependence (HCC)    Abnormal CT scan of lung    Moderate protein-calorie malnutrition (HCC)  -     Lipid panel; Future  -     Comprehensive metabolic panel; Future  -     CBC and differential; Future  -     TSH, 3rd generation; Future    Sacroiliitis, not elsewhere classified ([place] [address] )    Chronic obstructive pulmonary disease, unspecified COPD type ([place] [address] )  -     albuterol (PROVENTIL HFA,VENTOLIN HFA) 90 mcg/act inhaler; Inhale 2 puffs every 4 (four) hours as needed for wheezing  -     Lipid panel; Future  -     Comprehensive metabolic panel; Future  -     CBC and differential; Future  -     TSH, 3rd generation; Future    Anxiety  -     ALPRAZolam (XANAX) 0 25 mg tablet; Take 1 tablet (0 25 mg total) by mouth 3 (three) times a day as needed for anxiety  -     Lipid panel; Future  -     Comprehensive metabolic panel; Future  -     CBC and differential; Future  -     TSH, 3rd generation; Future          Subjective:      Patient ID: Taryn Johnson is a 74 y o  male  He has COPD  He smokes and is not interested in quitting  He has no wheezing or cough    He denies increased sputum production  He has no F/C  His immunizations are UTD  He has HTN  He denies CP or SOB  He has no PND or orthopnea  He has no HA or vision changes  He has ASCVD  He is on high-intensity statin therapy, a beta blocker, ASA, and plavix  There is room for an ARB  He has chronic pain  He is on Cymbalta and Percocet  He is not a candidate for NSAIDs due to ASA/Plavix  The following portions of the patient's history were reviewed and updated as appropriate:   He  has a past medical history of Anxiety, Chronic pain disorder, COPD (chronic obstructive pulmonary disease) (HCC) (na), Disease of thyroid gland, GERD (gastroesophageal reflux disease), Headache(784 0), Hyperlipidemia, Hypertension, Hyperthyroidism, NSTEMI (non-ST elevated myocardial infarction) ([facility] ) (4/13/2020), Pneumonia, and Visual impairment    He   Patient Active Problem List    Diagnosis Date Noted    Moderate protein-calorie malnutrition ([facility] ) 03/04/2022    Sacroiliitis, not elsewhere classified ([place] [address] ) 03/04/2022    Controlled substance agreement signed 11/02/2021    Tobacco use 11/02/2021    Hyponatremia 11/02/2021    ASCVD (arteriosclerotic cardiovascular disease) 04/15/2021    Abnormal CT scan of lung 10/15/2020    Upper respiratory tract infection 50/54/6445    Uncomplicated opioid dependence ([facility] ) 01/21/2020    Lumbar radiculopathy 02/26/2019    Screening for AAA (abdominal aortic aneurysm) 01/18/2019    Medicare annual wellness visit, initial 01/18/2019    Low back pain 12/19/2018    Displacement of lumbar intervertebral disc without myelopathy 12/19/2018    Lumbar spondylosis 12/14/2018    Bulging of lumbar intervertebral disc 12/14/2018    Screening for neurological condition 10/18/2018    Encounter for hepatitis C screening test for low risk patient 04/13/2018    Pain 04/13/2018    Benign essential hypertension 01/15/2018    Osteoarthritis of both knees 10/18/2017    Other instability, right knee 10/18/2017    Left knee pain 09/09/2016    Pain of left hip 09/09/2016    Neck pain 08/05/2016    Pain in joint of left shoulder 08/05/2016    Other chest pain 06/07/2016    Chronic low back pain 10/08/2015    Insomnia 09/29/2015    Early satiety 02/26/2015    GERD (gastroesophageal reflux disease) 10/31/2014    Headache 05/16/2014    Incisional hernia 10/11/2013    Weight loss, non-intentional 10/04/2013    Fatigue 06/26/2013    Anxiety 11/14/2012    Chronic obstructive pulmonary disease ([facility] ) 11/14/2012     He  has a past surgical history that includes Inguinal hernia repair (Bilateral); Epidural block injection (N/A, 1/17/2019); Epidural block injection (Left, 3/6/2019); FL guided needle plac bx/asp/inj (3/6/2019); Colonoscopy; Cardiac catheterization (07/02/2020); pr injection,sacroiliac joint (Left, 6/24/2021); and Epidural block injection (Left, 8/3/2021)  His family history includes Heart disease in his father, maternal grandfather, maternal grandmother, mother, paternal grandfather, and paternal grandmother  He  reports that he has been smoking cigarettes  He has a 15 00 pack-year smoking history  He has never used smokeless tobacco  He reports that he does not drink alcohol and does not use drugs    Current Outpatient Medications   Medication Sig Dispense Refill    albuterol (2 5 mg/3 mL) 0 083 % nebulizer solution Take 3 mL (2 5 mg total) by nebulization every 4 (four) hours as needed for wheezing or shortness of breath 300 mL 5    albuterol (PROVENTIL HFA,VENTOLIN HFA) 90 mcg/act inhaler Inhale 2 puffs every 4 (four) hours as needed for wheezing 18 g 5    ALPRAZolam (XANAX) 0 25 mg tablet Take 1 tablet (0 25 mg total) by mouth 3 (three) times a day as needed for anxiety 90 tablet 5    aspirin (Aspirin Low Dose) 81 mg EC tablet Take 1 tablet (81 mg total) by mouth daily 90 tablet 0    atorvastatin (LIPITOR) 80 mg tablet Take 1 tablet (80 mg total) by mouth every evening 90 tablet 3    clopidogrel (PLAVIX) 75 mg tablet take 1 tablet by mouth once daily 90 tablet 0    Diclofenac Sodium (VOLTAREN) 1 % Apply 4 g topically 4 (four) times a day 500 g 5    DULoxetine (CYMBALTA) 60 mg delayed release capsule take 1 capsule by mouth once daily 90 capsule 3    fluticasone-umeclidinium-vilanterol (Trelegy Ellipta) 100-62 5-25 MCG/INH inhaler Inhale 1 puff daily Rinse mouth after use  90 blister 0    losartan (COZAAR) 25 mg tablet Take 1 tablet (25 mg total) by mouth daily 90 tablet 3    metoprolol succinate (TOPROL-XL) 25 mg 24 hr tablet Take 1 tablet (25 mg total) by mouth daily 90 tablet 0    naloxone (NARCAN) 4 mg/0 1 mL nasal spray Administer 1 spray into a nostril  If no response after 2-3 minutes, give another dose in the other nostril using a new spray  1 each 1    oxyCODONE-acetaminophen (PERCOCET)  mg per tablet Take 1 tablet by mouth every 6 (six) hours as needed for moderate pain Max Daily Amount: 4 tablets 120 tablet 0    pantoprazole (PROTONIX) 40 mg tablet take 1 tablet by mouth once daily 90 tablet 0    zolpidem (AMBIEN) 5 mg tablet Take 1 tablet (5 mg total) by mouth daily at bedtime as needed for sleep 30 tablet 5     No current facility-administered medications for this visit       Current Outpatient Medications on File Prior to Visit   Medication Sig    albuterol (2 5 mg/3 mL) 0 083 % nebulizer solution Take 3 mL (2 5 mg total) by nebulization every 4 (four) hours as needed for wheezing or shortness of breath    aspirin (Aspirin Low Dose) 81 mg EC tablet Take 1 tablet (81 mg total) by mouth daily    atorvastatin (LIPITOR) 80 mg tablet Take 1 tablet (80 mg total) by mouth every evening    clopidogrel (PLAVIX) 75 mg tablet take 1 tablet by mouth once daily    Diclofenac Sodium (VOLTAREN) 1 % Apply 4 g topically 4 (four) times a day    DULoxetine (CYMBALTA) 60 mg delayed release capsule take 1 capsule by mouth once daily    fluticasone-umeclidinium-vilanterol (Trelegy Ellipta) 100-62 5-25 MCG/INH inhaler Inhale 1 puff daily Rinse mouth after use   metoprolol succinate (TOPROL-XL) 25 mg 24 hr tablet Take 1 tablet (25 mg total) by mouth daily    naloxone (NARCAN) 4 mg/0 1 mL nasal spray Administer 1 spray into a nostril  If no response after 2-3 minutes, give another dose in the other nostril using a new spray   oxyCODONE-acetaminophen (PERCOCET)  mg per tablet Take 1 tablet by mouth every 6 (six) hours as needed for moderate pain Max Daily Amount: 4 tablets    pantoprazole (PROTONIX) 40 mg tablet take 1 tablet by mouth once daily    zolpidem (AMBIEN) 5 mg tablet Take 1 tablet (5 mg total) by mouth daily at bedtime as needed for sleep    [DISCONTINUED] albuterol (PROVENTIL HFA,VENTOLIN HFA) 90 mcg/act inhaler Inhale 2 puffs every 4 (four) hours as needed for wheezing    [DISCONTINUED] ALPRAZolam (XANAX) 0 25 mg tablet Take 1 tablet (0 25 mg total) by mouth 3 (three) times a day as needed for anxiety     No current facility-administered medications on file prior to visit  He has No Known Allergies       Review of Systems   All other systems reviewed and are negative  Objective:      /80   Pulse 84   Temp 98 4 °F (36 9 °C)   Ht 6' (1 829 m)   Wt 63 5 kg (140 lb)   SpO2 98%   BMI 18 99 kg/m²          Physical Exam  Vitals and nursing note reviewed  Constitutional:       Appearance: Normal appearance  He is normal weight  HENT:      Head: Normocephalic and atraumatic  Cardiovascular:      Rate and Rhythm: Normal rate and regular rhythm  Pulses: Normal pulses  Heart sounds: Normal heart sounds  Pulmonary:      Effort: Pulmonary effort is normal       Breath sounds: Normal breath sounds  Abdominal:      General: Abdomen is flat  Bowel sounds are normal       Palpations: Abdomen is soft  Musculoskeletal:         General: Normal range of motion        Cervical back: Normal range of motion and neck supple  Skin:     General: Skin is warm and dry  Capillary Refill: Capillary refill takes less than 2 seconds  Neurological:      General: No focal deficit present  Mental Status: He is alert and oriented to person, place, and time  Mental status is at baseline  Psychiatric:         Mood and Affect: Mood normal          Behavior: Behavior normal          Thought Content:  Thought content normal          Judgment: Judgment normal

## 2022-03-04 NOTE — PROGRESS NOTES
Depression Screening and Follow-up Plan: Patient was screened for depression during today's encounter  They screened negative with a PHQ-2 score of 2

## 2022-03-10 DIAGNOSIS — D50.0 IRON DEFICIENCY ANEMIA DUE TO CHRONIC BLOOD LOSS: Primary | ICD-10-CM

## 2022-03-17 DIAGNOSIS — J44.9 CHRONIC OBSTRUCTIVE PULMONARY DISEASE, UNSPECIFIED COPD TYPE (HCC): ICD-10-CM

## 2022-03-17 DIAGNOSIS — R52 PAIN: ICD-10-CM

## 2022-03-18 RX ORDER — PANTOPRAZOLE SODIUM 40 MG/1
40 TABLET, DELAYED RELEASE ORAL DAILY
Qty: 90 TABLET | Refills: 0 | Status: SHIPPED | OUTPATIENT
Start: 2022-03-18 | End: 2022-06-17

## 2022-03-18 RX ORDER — FLUTICASONE FUROATE, UMECLIDINIUM BROMIDE AND VILANTEROL TRIFENATATE 100; 62.5; 25 UG/1; UG/1; UG/1
1 POWDER RESPIRATORY (INHALATION) DAILY
Qty: 90 BLISTER | Refills: 0 | Status: SHIPPED | OUTPATIENT
Start: 2022-03-18 | End: 2022-04-27 | Stop reason: ALTCHOICE

## 2022-03-22 ENCOUNTER — TELEPHONE (OUTPATIENT)
Dept: PAIN MEDICINE | Facility: CLINIC | Age: 67
End: 2022-03-22

## 2022-03-22 NOTE — TELEPHONE ENCOUNTER
Patient calling for update on procedure made aware  will call once approved by insurance  He understood, he is in a lot of pain       Thank you

## 2022-03-24 ENCOUNTER — TELEPHONE (OUTPATIENT)
Dept: PAIN MEDICINE | Facility: CLINIC | Age: 67
End: 2022-03-24

## 2022-03-24 ENCOUNTER — PREP FOR PROCEDURE (OUTPATIENT)
Dept: PAIN MEDICINE | Facility: CLINIC | Age: 67
End: 2022-03-24

## 2022-03-24 DIAGNOSIS — M54.16 LUMBAR RADICULOPATHY: Primary | ICD-10-CM

## 2022-03-24 NOTE — TELEPHONE ENCOUNTER
Scheduled Lt SIJ RFA for 3/29/2022  Pt aware of instructions  No food/drink one hour prior  Wear comfortable clothing  A  is required  Continue all other medications on day of procedure  Call if prescribed antibiotics or develop active infection  Refrain from vaccinations two weeks before and after procedure  Call with questions

## 2022-03-28 DIAGNOSIS — M47.816 LUMBAR SPONDYLOSIS: ICD-10-CM

## 2022-03-28 DIAGNOSIS — G89.4 CHRONIC PAIN SYNDROME: ICD-10-CM

## 2022-03-28 RX ORDER — OXYCODONE AND ACETAMINOPHEN 10; 325 MG/1; MG/1
1 TABLET ORAL EVERY 6 HOURS PRN
Qty: 120 TABLET | Refills: 0 | Status: SHIPPED | OUTPATIENT
Start: 2022-03-28 | End: 2022-04-25 | Stop reason: SDUPTHER

## 2022-03-29 ENCOUNTER — ANESTHESIA (OUTPATIENT)
Dept: GASTROENTEROLOGY | Facility: HOSPITAL | Age: 67
End: 2022-03-29
Payer: COMMERCIAL

## 2022-03-29 ENCOUNTER — TELEPHONE (OUTPATIENT)
Dept: RADIOLOGY | Facility: CLINIC | Age: 67
End: 2022-03-29

## 2022-03-29 ENCOUNTER — APPOINTMENT (OUTPATIENT)
Dept: RADIOLOGY | Facility: HOSPITAL | Age: 67
End: 2022-03-29
Payer: COMMERCIAL

## 2022-03-29 ENCOUNTER — ANESTHESIA EVENT (OUTPATIENT)
Dept: GASTROENTEROLOGY | Facility: HOSPITAL | Age: 67
End: 2022-03-29
Payer: COMMERCIAL

## 2022-03-29 ENCOUNTER — HOSPITAL ENCOUNTER (OUTPATIENT)
Facility: HOSPITAL | Age: 67
Setting detail: OUTPATIENT SURGERY
Discharge: HOME | End: 2022-03-29
Attending: ANESTHESIOLOGY | Admitting: ANESTHESIOLOGY
Payer: COMMERCIAL

## 2022-03-29 VITALS
SYSTOLIC BLOOD PRESSURE: 142 MMHG | HEIGHT: 72 IN | RESPIRATION RATE: 18 BRPM | OXYGEN SATURATION: 95 % | WEIGHT: 140 LBS | TEMPERATURE: 98 F | DIASTOLIC BLOOD PRESSURE: 67 MMHG | HEART RATE: 79 BPM | BODY MASS INDEX: 18.96 KG/M2

## 2022-03-29 PROCEDURE — 64625 RF ABLTJ NRV NRVTG SI JT: CPT | Performed by: ANESTHESIOLOGY

## 2022-03-29 RX ORDER — LIDOCAINE HYDROCHLORIDE 10 MG/ML
INJECTION, SOLUTION EPIDURAL; INFILTRATION; INTRACAUDAL; PERINEURAL AS NEEDED
Status: DISCONTINUED | OUTPATIENT
Start: 2022-03-29 | End: 2022-03-29

## 2022-03-29 RX ORDER — LIDOCAINE HYDROCHLORIDE 10 MG/ML
INJECTION, SOLUTION EPIDURAL; INFILTRATION; INTRACAUDAL; PERINEURAL AS NEEDED
Status: DISCONTINUED | OUTPATIENT
Start: 2022-03-29 | End: 2022-03-29 | Stop reason: HOSPADM

## 2022-03-29 RX ORDER — PROPOFOL 10 MG/ML
INJECTION, EMULSION INTRAVENOUS AS NEEDED
Status: DISCONTINUED | OUTPATIENT
Start: 2022-03-29 | End: 2022-03-29

## 2022-03-29 RX ORDER — FENTANYL CITRATE 50 UG/ML
INJECTION, SOLUTION INTRAMUSCULAR; INTRAVENOUS AS NEEDED
Status: DISCONTINUED | OUTPATIENT
Start: 2022-03-29 | End: 2022-03-29

## 2022-03-29 RX ORDER — METHYLPREDNISOLONE ACETATE 80 MG/ML
INJECTION, SUSPENSION INTRA-ARTICULAR; INTRALESIONAL; INTRAMUSCULAR; SOFT TISSUE AS NEEDED
Status: DISCONTINUED | OUTPATIENT
Start: 2022-03-29 | End: 2022-03-29 | Stop reason: HOSPADM

## 2022-03-29 RX ORDER — KETAMINE HCL IN NACL, ISO-OSM 100MG/10ML
SYRINGE (ML) INJECTION AS NEEDED
Status: DISCONTINUED | OUTPATIENT
Start: 2022-03-29 | End: 2022-03-29

## 2022-03-29 RX ORDER — MIDAZOLAM HYDROCHLORIDE 2 MG/2ML
INJECTION, SOLUTION INTRAMUSCULAR; INTRAVENOUS AS NEEDED
Status: DISCONTINUED | OUTPATIENT
Start: 2022-03-29 | End: 2022-03-29

## 2022-03-29 RX ORDER — BUPIVACAINE HYDROCHLORIDE 2.5 MG/ML
INJECTION, SOLUTION EPIDURAL; INFILTRATION; INTRACAUDAL AS NEEDED
Status: DISCONTINUED | OUTPATIENT
Start: 2022-03-29 | End: 2022-03-29 | Stop reason: HOSPADM

## 2022-03-29 RX ORDER — DEXMEDETOMIDINE HYDROCHLORIDE 100 UG/ML
INJECTION, SOLUTION INTRAVENOUS AS NEEDED
Status: DISCONTINUED | OUTPATIENT
Start: 2022-03-29 | End: 2022-03-29

## 2022-03-29 RX ORDER — SODIUM CHLORIDE, SODIUM LACTATE, POTASSIUM CHLORIDE, CALCIUM CHLORIDE 600; 310; 30; 20 MG/100ML; MG/100ML; MG/100ML; MG/100ML
50 INJECTION, SOLUTION INTRAVENOUS CONTINUOUS
Status: CANCELLED | OUTPATIENT
Start: 2022-03-29

## 2022-03-29 RX ORDER — SODIUM CHLORIDE, SODIUM LACTATE, POTASSIUM CHLORIDE, CALCIUM CHLORIDE 600; 310; 30; 20 MG/100ML; MG/100ML; MG/100ML; MG/100ML
INJECTION, SOLUTION INTRAVENOUS CONTINUOUS PRN
Status: DISCONTINUED | OUTPATIENT
Start: 2022-03-29 | End: 2022-03-29

## 2022-03-29 RX ORDER — EPHEDRINE SULFATE 50 MG/ML
INJECTION INTRAVENOUS AS NEEDED
Status: DISCONTINUED | OUTPATIENT
Start: 2022-03-29 | End: 2022-03-29

## 2022-03-29 RX ORDER — PROPOFOL 10 MG/ML
INJECTION, EMULSION INTRAVENOUS CONTINUOUS PRN
Status: DISCONTINUED | OUTPATIENT
Start: 2022-03-29 | End: 2022-03-29

## 2022-03-29 RX ORDER — LIDOCAINE HYDROCHLORIDE 20 MG/ML
INJECTION, SOLUTION EPIDURAL; INFILTRATION; INTRACAUDAL; PERINEURAL AS NEEDED
Status: DISCONTINUED | OUTPATIENT
Start: 2022-03-29 | End: 2022-03-29 | Stop reason: HOSPADM

## 2022-03-29 RX ADMIN — SODIUM CHLORIDE, SODIUM LACTATE, POTASSIUM CHLORIDE, AND CALCIUM CHLORIDE: .6; .31; .03; .02 INJECTION, SOLUTION INTRAVENOUS at 12:40

## 2022-03-29 RX ADMIN — PROPOFOL 50 MG: 10 INJECTION, EMULSION INTRAVENOUS at 12:58

## 2022-03-29 RX ADMIN — FENTANYL CITRATE 50 MCG: 50 INJECTION, SOLUTION INTRAMUSCULAR; INTRAVENOUS at 12:58

## 2022-03-29 RX ADMIN — DEXMEDETOMIDINE HCL 12 MCG: 100 INJECTION INTRAVENOUS at 12:59

## 2022-03-29 RX ADMIN — Medication 10 MG: at 13:01

## 2022-03-29 RX ADMIN — PROPOFOL 100 MCG/KG/MIN: 10 INJECTION, EMULSION INTRAVENOUS at 12:58

## 2022-03-29 RX ADMIN — EPHEDRINE SULFATE 10 MG: 50 INJECTION, SOLUTION INTRAVENOUS at 13:26

## 2022-03-29 RX ADMIN — LIDOCAINE HYDROCHLORIDE 50 MG: 10 INJECTION, SOLUTION EPIDURAL; INFILTRATION; INTRACAUDAL; PERINEURAL at 12:58

## 2022-03-29 RX ADMIN — MIDAZOLAM HYDROCHLORIDE 2 MG: 1 INJECTION, SOLUTION INTRAMUSCULAR; INTRAVENOUS at 13:01

## 2022-03-29 RX ADMIN — EPHEDRINE SULFATE 10 MG: 50 INJECTION, SOLUTION INTRAVENOUS at 13:14

## 2022-03-29 NOTE — OP NOTE
OPERATIVE REPORT  PATIENT NAME: Audi Evans    :  1955  MRN: [id number]  Pt Location:  GI ROOM 01    SURGERY DATE: 3/29/2022    Surgeon(s) and Role: Subhash Marx MD - Primary    Preop Diagnosis:  Lumbar radiculopathy [M54 16]    Post-Op Diagnosis Codes:     * Lumbar radiculopathy [M54 16]    Procedure(s) (LRB):  ABLATION RADIO FREQUENCY (RFA) - Left SIJ (Left)    Specimen(s):  * No specimens in log *    Estimated Blood Loss:   Minimal    Drains:  * No LDAs found *    Anesthesia Type:   IV Sedation with Anesthesia    Operative Indications:  Lumbar radiculopathy [M54 16]    Operative Findings:  Left sacral medial branch nerve regions identified under fluoroscopic guidance   Appropriate motor testing performed prior to radiofrequency lesioning of sacral medial branch nerves    Complications:   None    Procedure and Technique:  Please see detailed procedure note    I was present for the entire procedure    Patient Disposition:  PACU       SIGNATURE: Jada Mendoza MD  DATE: 2022  TIME: 1:42 PM

## 2022-03-29 NOTE — H&P
Assessment  1  Lumbar radiculopathy  -     MRI lumbar spine wo contrast; Future; Expected date: 06/16/2021    2  Sacroiliac joint dysfunction of left side  -     Case request operating room: BLOCK / INJECTION SACROILIAC; Standing  -     Case request operating room: BLOCK / INJECTION SACROILIAC    Greater pain relief (>80%) with sacroiliac joint injection vs left s1 tfesi with improved ability to participate with IADLs in significantly less pain  MRI lumbar spine shows left S1 sacral insufficiency fracture  +juan miguel's, +gaenslen's +SIJ loading left sided  Risks, benefits and alternatives to left SIJ RFA discussed with patient  Informed consent obtained  Mild spondyloarthropathy that is noncompressive it lumbar spine  Ligamentum flavum infolding at L3-4 and L4-5 as well  Patient describes pain in left S1 dermatomal distribution accompanied by weakness numbness and paresthesias on occasion  Most of his pain seems to be centered around his left sacroiliac joint  Positive Juan Miguel's, positive Gaenslen's, positive SI joint loading left-sided  Tenderness to palpation over left sacroiliac joint  He had an  Epidural steroid injection in March of 2019 that did not provide significant benefit  He has been to physical therapy many times in the past without significant benefit  Additionally he is on opioid therapy  Will proceed with multimodal pain therapy plan in treating this patient's pain  While obtaining more recent imaging to guide therapy  Plan  -Left sacroiliac joint injection radiofrequency ablation; rtc 3 weeks post procedure  -may continue analgesics as previously ordered; cautioned regarding concomitant benzodiazepines with opioids  -physical therapy for left-sided lumbar radiculopathy, SIJ dysfunction; Core exercises additionally provided for physician directed home PT that the patient plans to participate with for 1 hour, twice a week for the next 6 weeks       There are risks associated with opioid medications, including dependence, addiction and tolerance  The patient understands and agrees to use these medications only as prescribed  Potential side effects of the medications include, but are not limited to, constipation, drowsiness, addiction, impaired judgment and risk of fatal overdose if not taken as prescribed  The patient was warned against driving while taking sedation medications  Sharing medications is a felony  At this point in time, the patient is showing no signs of addiction, abuse, diversion or suicidal ideation  [place] Prescription Drug Monitoring Program report was reviewed and was appropriate     Complete risks and benefits including bleeding, infection, tissue reaction, nerve injury and allergic reaction were discussed  The approach was demonstrated using models and literature was provided  Verbal and written consent was obtained  My impressions and treatment recommendations were discussed in detail with the patient who verbalized understanding and had no further questions  Discharge instructions were provided  I personally saw and examined the patient and I agree with the above discussed plan of care  No orders of the defined types were placed in this encounter  History of Present Illness    Greater pain relief (>80%) with sacroiliac joint injection vs left s1 tfesi with improved ability to participate with IADLs in significantly less pain  Risks, benefits and alternatives to left SIJ RFA discussed with patient  Informed consent obtained  Maynor Nieves is a 74 y o  male with a pmhx of smoking, copd with 1ppd smoking history, GERD, CAD on plavix presenting with chronic lumbar radicular pain in the left S1 dermatomal distribution  Additionally describes left sided low back pain with achy, nagging, indolent, crampy stabbing and throbbing features  Debilitating weakness numbness and paresthesias accompany the pain   The patient rates the pain at a 8/10 accompanied by electric shock-like shooting features and crampy burning pain in the aforementioned dermatomal distributions  The pain is worse in the mornings as well as the end of the day; exertion such as walking for long periods of time seems to exacerbate the pain  The patient can hardly walk more than a few blocks without having debilitating pain  He tries to maintain an active lifestyle and finds that the current degree of pain seems to compromises his efforts  The pain significantly impacts independent activities of daily living and contributes to significant disability  He has attempted many courses of physical therapy without significant benefit in the past   He has taken percocet as well as tylenol #4 with limited relief of the pain as well  He has had left L4 and L5 epidural steroid injections in the past with 10% benefit  He denies any bowel or bladder dysfunction/incontinence    I have personally reviewed and/or updated the patient's past medical history, past surgical history, family history, social history, current medications, allergies, and vital signs today  Review of Systems   Constitutional: Positive for activity change  HENT: Negative  Eyes: Negative  Respiratory: Negative  Cardiovascular: Negative  Gastrointestinal: Negative  Endocrine: Negative  Genitourinary: Negative  Musculoskeletal: Positive for arthralgias, back pain, gait problem and myalgias  Skin: Negative  Allergic/Immunologic: Negative  Neurological: Positive for weakness and numbness  Hematological: Negative  Psychiatric/Behavioral: Negative  All other systems reviewed and are negative        Patient Active Problem List   Diagnosis    Encounter for hepatitis C screening test for low risk patient    Pain    Anxiety    Benign essential hypertension    Chronic obstructive pulmonary disease ([facility] )    Screening for neurological condition    Chronic low back pain    Early satiety    GERD (gastroesophageal reflux disease)    Fatigue    Headache    Incisional hernia    Insomnia    Left knee pain    Neck pain    Osteoarthritis of both knees    Other chest pain    Other instability, right knee    Pain in joint of left shoulder    Pain of left hip    Weight loss, non-intentional    Lumbar spondylosis    Bulging of lumbar intervertebral disc    Low back pain    Displacement of lumbar intervertebral disc without myelopathy    Screening for AAA (abdominal aortic aneurysm)    Medicare annual wellness visit, initial    Lumbar radiculopathy    Uncomplicated opioid dependence ([facility] )    Abnormal CT scan of lung    Upper respiratory tract infection    ASCVD (arteriosclerotic cardiovascular disease)    Controlled substance agreement signed    Tobacco use    Hyponatremia    Moderate protein-calorie malnutrition (HCC)    Sacroiliitis, not elsewhere classified ([facility] )       Past Medical History:   Diagnosis Date    Anxiety     Chronic pain disorder     back    COPD (chronic obstructive pulmonary disease) ([facility] ) na    Disease of thyroid gland     GERD (gastroesophageal reflux disease)     Headache(784 0)     Hyperlipidemia     Hypertension     Hyperthyroidism     last assessed: 10/28/16    [facility]non-ST elevated [facility] 4/13/2020    Pneumonia     Visual impairment        Past Surgical History:   Procedure Laterality Date    CARDIAC CATHETERIZATION  07/02/2020    EF 65% normal    COLONOSCOPY      EPIDURAL BLOCK INJECTION N/A 1/17/2019    Procedure: L5 S1 Lumbar Epidural Steroid Injection (59020);   Surgeon: Walter Weaver MD;  Location: [facility];  Service: Pain Management     EPIDURAL BLOCK INJECTION Left 3/6/2019    Procedure: L4- L5 and L5-S1 transforaminal epidural steroid injection;  Surgeon: Walter Weaver MD;  [facility]: [facility];  Service: Pain Management     EPIDURAL BLOCK INJECTION Left 8/3/2021    Procedure: S1 TRANSFORAMINAL EPIDURAL STEROID INJECTION;  Surgeon: Severino Barrera MD;  [facility];  Service: Pain Management     FL GUIDED NEEDLE PLAC BX/ASP/INJ  3/6/2019    INGUINAL HERNIA REPAIR Bilateral     W[place] INJECTION,SACROILIAC JOINT Left 6/24/2021    Procedure: BLOCK / INJECTION SACROILIAC;  Surgeon: Severino Barrera MD;  [facility];  Service: Pain Management        Family History   Problem Relation Age of Onset    Heart disease Mother     Heart disease Father         cardiac disorder    Heart disease Maternal Grandmother         cardiac disorder    Heart disease Maternal Grandfather         cardiac disorder    Heart disease Paternal Grandmother         cardiac disorder    Heart disease Paternal Grandfather         cardiac disorder       Social History     Occupational History    Not on file   Tobacco Use    Smoking status: Current Every Day Smoker     Packs/day: 1 00     Years: 15 00     Pack years: 15 00     Types: Cigarettes    Smokeless tobacco: Never Used   Vaping Use    Vaping Use: Never used   Substance and Sexual Activity    Alcohol use: No    Drug use: No    Sexual activity: Not Currently       No current facility-administered medications on file prior to encounter       Current Outpatient Medications on File Prior to Encounter   Medication Sig    ALPRAZolam (XANAX) 0 25 mg tablet Take 1 tablet (0 25 mg total) by mouth 3 (three) times a day as needed for anxiety    aspirin (Aspirin Low Dose) 81 mg EC tablet Take 1 tablet (81 mg total) by mouth daily    atorvastatin (LIPITOR) 80 mg tablet Take 1 tablet (80 mg total) by mouth every evening    clopidogrel (PLAVIX) 75 mg tablet take 1 tablet by mouth once daily    DULoxetine (CYMBALTA) 60 mg delayed release capsule take 1 capsule by mouth once daily    losartan (COZAAR) 25 mg tablet Take 1 tablet (25 mg total) by mouth daily    metoprolol succinate (TOPROL-XL) 25 mg 24 hr tablet Take 1 tablet (25 mg total) by mouth daily    pantoprazole (PROTONIX) 40 mg tablet Take 1 tablet (40 mg total) by mouth daily    zolpidem (AMBIEN) 5 mg tablet Take 1 tablet (5 mg total) by mouth daily at bedtime as needed for sleep    albuterol (2 5 mg/3 mL) 0 083 % nebulizer solution Take 3 mL (2 5 mg total) by nebulization every 4 (four) hours as needed for wheezing or shortness of breath    albuterol (PROVENTIL HFA,VENTOLIN HFA) 90 mcg/act inhaler Inhale 2 puffs every 4 (four) hours as needed for wheezing    Diclofenac Sodium (VOLTAREN) 1 % Apply 4 g topically 4 (four) times a day    fluticasone-umeclidinium-vilanterol (Trelegy Ellipta) 100-62 5-25 MCG/INH inhaler Inhale 1 puff daily Rinse mouth after use   naloxone (NARCAN) 4 mg/0 1 mL nasal spray Administer 1 spray into a nostril  If no response after 2-3 minutes, give another dose in the other nostril using a new spray  No Known Allergies      Physical Exam    /63   Pulse 70   Temp 98 2 °F (36 8 °C) (Oral)   Resp 20   Ht 6' (1 829 m)   Wt 63 5 kg (140 lb)   SpO2 99%   BMI 18 99 kg/m²     Constitutional: normal, well developed, well nourished, alert, in no distress and non-toxic and no overt pain behavior  Eyes: anicteric  HEENT: grossly intact  Neck: supple, symmetric, trachea midline and no masses   Pulmonary:even and unlabored  Cardiovascular:No edema or pitting edema present  Skin:Normal without rashes or lesions and well hydrated  Psychiatric:Mood and affect appropriate  Neurologic:Cranial Nerves II-XII grossly intact Sensation grossly intact; no clonus negative perez's  Reflexes 2+ and brisk  SLR positive bilaterally  Musculoskeletal:normal gait  4/5 strength with ankle dorsiflexion bilaterally; otherwise 5/5 strength in all extremities with active range of motion movements bilaterally  Normal heel toe and tip toe walking  Pain with lumbar facet loading bilaterally and with lateral spine rotation  ttp over lumbar paraspinal muscles   Positive bradley's test, positive gaenslen's positive SIJ loading left sided  Imaging    MRI LUMBAR SPINE WITHOUT CONTRAST     INDICATION: [id number]: Radiculopathy, lumbar region      COMPARISON:  [facility] 12/5/2018      TECHNIQUE:  Sagittal T1, sagittal T2, sagittal inversion recovery, axial T1 and axial T2, coronal T2     IMAGE QUALITY:  Diagnostic     FINDINGS:     VERTEBRAL BODIES:  There are 5 nonrib-bearing lumbar type vertebral bodies present  Normal alignment of the lumbar spine  No spondylolysis or spondylolisthesis  No scoliosis  No compression fracture  No worrisome marrow edema is evident      SACRUM:  The signal changes within the left S1 sacral ala concerning for insufficiency fracture  This could be confirmed with CT      DISTAL CORD AND CONUS:  Conus terminates at the L1 level      PARASPINAL SOFT TISSUES:  Paraspinal soft tissues are unremarkable      LOWER THORACIC DISC SPACES:  Normal disc height and signal   No disc herniation, canal stenosis or foraminal narrowing      LUMBAR DISC SPACES:     L1-L2:  Very minor disc bulge     L2-L3:  Normal      L3-L4:  Slight reduction disc height, minor circumferential bulge  Minor facet arthrosis      L4-L5:  Minor bulge  Minor facet arthrosis  Small inferior right extraspinal facet cyst      L5-S1:  Mild facet arthrosis      IMPRESSION:     Multilevel noncompressive degenerative changes present to a relatively mild degree, have progressed only minimally in the 3 year interval since prior exam      Concern for left S1 sacral insufficiency fracture    This could be confirmed with CT of the bony pelvis         Workstation performed: [id number]

## 2022-03-29 NOTE — PROCEDURES
Pre-procedure Diagnosis:       LEFT Sacroilitis/sacroiliac joint dysfunction  Post-procedure Diagnosis:     LEFT Sacroiliitis/sacroiliac joint dysfunction  Operation Title(s):                   1  Radiofrequency ablation of [LEFT] sacroiliac joint medial branch nerves (L4-S3)                                                  2  Intraoperative fluoroscopy  Attending Surgeon: Lexa Ram MD  Anesthesia:                             Local, IV sedation with Anesthesia     Indications: The patient is a 72 y  o  year-old male with a diagnosis of LEFT sacroiliitis/sacroiliac joint dysfunction  The patient's history and physical exam were reviewed  The patient has previously undergone diagnostic sacroiliac joint injections that provided >80% relief with improved participation with IADLs for varying periods of time   Fluoroscopy is being used for the precise placement of the needles at the sacral medial branch nerves   The risks, benefits and alternatives to the procedure were discussed, and all questions were answered to the patient's satisfaction  The patient agreed to proceed, and written informed consent was obtained      Procedure in Detail: The patient was brought into the procedure room and placed in the prone position on the fluoroscopy table  The low back and upper buttock were prepped with chloraprep times two and draped in a sterile manner       AP fluoroscopy was used to identify the sacroiliac joint on the [LEFT] side  The fluoroscope beam was then obliqued to better visualize the medial border on the [LEFT] side  8, 18-gauge, 100mm length, 10mm curved active tip radiofrequency probe was advanced toward targeted points lateral to sacral foramen and medial to sacroiliac joint  until bone was contacted  Additionally the right L4 and L5 medial branch nerve regions were contacted with two separate RF cannulas; lateral views were taken multiple times to ensure proper placement   Multiple fluoroscopic views were made to ensure placement of the needle tip at the appropriate location of the medial branch nerve   Motor stimulation was performed at 2 Hz and 1 2 volts generating a twitch in the paraspinal muscles with no motor activity in the lower extremities      0 5ml of 2% lidocaine was injected prior to lesioning, which was performed for 90 seconds at 80 degrees centigrade  The lesion was repeated once more after slight repositioning of the needles on an oblique view   Once the lesions were complete, 1ml of a solution consisting of 7mL 0 25% bupivacaine and 1 mL Depo-medrol (80mg/mL) was injected through each needle and then removed with a 1% lidocaine flush  The patient's back was cleaned, and bandages were placed at the needle insertion site      Disposition: The patient tolerated the procedure well and there were no apparent complications  Vital signs remained stable throughout the procedure   The patient was taken to the recovery area where written discharge instructions for the procedure were given      Estimated Blood Loss: None  Specimens Obtained: N/A

## 2022-03-29 NOTE — ANESTHESIA POSTPROCEDURE EVALUATION
Post-Op Assessment Note    CV Status:  Stable  Pain Score: 0    Pain management: adequate     Mental Status:  Alert and awake   Hydration Status:  Euvolemic   PONV Controlled:  Controlled   Airway Patency:  Patent      Post Op Vitals Reviewed: Yes      Staff: CRNA         No complications documented      /58 (03/29/22 1339)    Temp 97 7 °F (36 5 °C) (03/29/22 1339)    Pulse 69 (03/29/22 1339)   Resp 22 (03/29/22 1339)    SpO2 95 % (03/29/22 1339)

## 2022-03-29 NOTE — DISCHARGE INSTRUCTIONS
PLEASE SCHEDULE 2 WEEK FOLLOW UP BY CALLING THE SPINE [facility] AT [facility]: [phone]    Sacroiliac joint Radiofrequency Ablation   WHAT YOU NEED TO KNOW:   Sacroiliac joint radiofrequency ablation (RFA) is a procedure used to treat facet joint pain in your lower back and Sacroiliac joint  Facet joints are found at the back of each vertebra  A needle electrode is used to send electrical currents to the nerves in your facet joint  The electrical currents create heat that damages the nerve so it cannot send pain signals  DISCHARGE INSTRUCTIONS:   Seek care immediately if:   · You cannot move your leg  · You cannot control your urine or bowel movements  · You have severe pain in your lower back  Contact your healthcare provider if:   · You have leg weakness  · You develop new symptoms  · You have questions or concerns about your condition or care  Medicines:   · Pain medicine  may be given  Ask how to take this medicine safely  · Take your medicine as directed  Contact your healthcare provider if you think your medicine is not helping or if you have side effects  Tell him or her if you are allergic to any medicine  Keep a list of the medicines, vitamins, and herbs you take  Include the amounts, and when and why you take them  Bring the list or the pill bottles to follow-up visits  Carry your medicine list with you in case of an emergency  Follow up with your healthcare provider as directed:  Write down your questions so you remember to ask them during your visits  Activity:  Do not drive a car or operate machinery within 24 hours after your procedure  Ask your healthcare provider about any other activities you should avoid  © Copyright [address] Information is for End User's use only and may not be sold, redistributed or otherwise used for commercial purposes   All illustrations and images included in CareNotes® are the copyrighted property of [employer]  or [employer] [facility]  The above information is an  only  It is not intended as medical advice for individual conditions or treatments  Talk to your doctor, nurse or pharmacist before following any medical regimen to see if it is safe and effective for you

## 2022-03-30 NOTE — TELEPHONE ENCOUNTER
FYI:  S/W pt who states he's doing well    Notices a slight improvement  Aware it can take time for max benefit  Denies s/s of infection or sunburn  Will have daughter look at area to make sure it looks ok when she gets home today  Advised to [facility] with drainage, swelling or redness  Otherwise will see pt at f/u OV

## 2022-03-31 ENCOUNTER — TELEPHONE (OUTPATIENT)
Dept: GASTROENTEROLOGY | Facility: CLINIC | Age: 67
End: 2022-03-31

## 2022-03-31 DIAGNOSIS — I21.4 NSTEMI (NON-ST ELEVATED MYOCARDIAL INFARCTION) (HCC): ICD-10-CM

## 2022-03-31 RX ORDER — ATORVASTATIN CALCIUM 80 MG/1
TABLET, FILM COATED ORAL
Qty: 90 TABLET | Refills: 3 | Status: SHIPPED | OUTPATIENT
Start: 2022-03-31 | End: 2022-07-05 | Stop reason: SDUPTHER

## 2022-04-09 DIAGNOSIS — G47.00 INSOMNIA, UNSPECIFIED TYPE: ICD-10-CM

## 2022-04-11 RX ORDER — ASPIRIN 81 MG/1
TABLET, COATED ORAL
Qty: 90 TABLET | Refills: 0 | Status: SHIPPED | OUTPATIENT
Start: 2022-04-11 | End: 2022-07-08

## 2022-04-20 ENCOUNTER — TRANSITIONAL CARE MANAGEMENT (OUTPATIENT)
Dept: FAMILY MEDICINE CLINIC | Facility: CLINIC | Age: 67
End: 2022-04-20

## 2022-04-22 ENCOUNTER — OPTICAL OFFICE (OUTPATIENT)
Dept: URBAN - NONMETROPOLITAN AREA CLINIC 4 | Facility: CLINIC | Age: 67
Setting detail: OPHTHALMOLOGY
End: 2022-04-22
Payer: COMMERCIAL

## 2022-04-22 ENCOUNTER — DOCTOR'S OFFICE (OUTPATIENT)
Dept: URBAN - NONMETROPOLITAN AREA CLINIC 1 | Facility: CLINIC | Age: 67
Setting detail: OPHTHALMOLOGY
End: 2022-04-22
Payer: COMMERCIAL

## 2022-04-22 DIAGNOSIS — H52.4: ICD-10-CM

## 2022-04-22 DIAGNOSIS — H52.223: ICD-10-CM

## 2022-04-22 PROBLEM — D23.122 OTHER BENIGN NEOPLASM OF SKIN OF LEFT LOWER EYELID, INCLUDING CANTHUS: Status: ACTIVE | Noted: 2021-04-21

## 2022-04-22 PROBLEM — H25.013 CORTICAL CATARACT; BOTH EYES: Status: ACTIVE | Noted: 2021-04-21

## 2022-04-22 PROCEDURE — 92014 COMPRE OPH EXAM EST PT 1/>: CPT | Performed by: OPTOMETRIST

## 2022-04-22 PROCEDURE — V2020 VISION SVCS FRAMES PURCHASES: HCPCS | Performed by: OPTOMETRIST

## 2022-04-22 PROCEDURE — V2303 LENS SPHCY TRIFOCAL 4.0/.12-: HCPCS | Performed by: OPTOMETRIST

## 2022-04-22 PROCEDURE — V2784 LENS POLYCARB OR EQUAL: HCPCS | Performed by: OPTOMETRIST

## 2022-04-22 ASSESSMENT — REFRACTION_MANIFEST
OS_VA1: 20/25-2
OD_ADD: +2.50
OD_SPHERE: -1.50
OS_CYLINDER: -0.50
OD_CYLINDER: -0.75
OS_SPHERE: -1.25
OD_VA1: 20/25
OS_AXIS: 100
OD_VA2: 20/25
OS_ADD: +2.50
OD_AXIS: 095
OS_VA2: 20/25-2

## 2022-04-22 ASSESSMENT — REFRACTION_CURRENTRX
OD_OVR_VA: 20/
OS_OVR_VA: 20/
OD_ADD: +2.50
OD_SPHERE: -1.75
OS_VPRISM_DIRECTION: TRF
OS_AXIS: 100
OS_CYLINDER: -0.50
OS_ADD: +2.50
OD_CYLINDER: -0.75
OD_VPRISM_DIRECTION: TRF
OD_AXIS: 095
OS_SPHERE: -1.50

## 2022-04-22 ASSESSMENT — SPHEQUIV_DERIVED
OD_SPHEQUIV: -1.875
OS_SPHEQUIV: -1.5
OS_SPHEQUIV: -1.25
OD_SPHEQUIV: -1.875

## 2022-04-22 ASSESSMENT — VISUAL ACUITY
OD_BCVA: 20/25
OS_BCVA: 20/25

## 2022-04-22 ASSESSMENT — REFRACTION_AUTOREFRACTION
OD_CYLINDER: -0.75
OS_SPHERE: -1.25
OS_CYLINDER: 0.00
OS_AXIS: 180
OD_SPHERE: -1.50
OD_AXIS: 102

## 2022-04-22 ASSESSMENT — CONFRONTATIONAL VISUAL FIELD TEST (CVF)
OD_FINDINGS: FULL
OS_FINDINGS: FULL

## 2022-04-22 ASSESSMENT — TONOMETRY
OD_IOP_MMHG: 14
OS_IOP_MMHG: 14

## 2022-04-25 DIAGNOSIS — G89.4 CHRONIC PAIN SYNDROME: ICD-10-CM

## 2022-04-25 DIAGNOSIS — M47.816 LUMBAR SPONDYLOSIS: ICD-10-CM

## 2022-04-25 RX ORDER — OXYCODONE AND ACETAMINOPHEN 10; 325 MG/1; MG/1
1 TABLET ORAL EVERY 6 HOURS PRN
Qty: 120 TABLET | Refills: 0 | Status: SHIPPED | OUTPATIENT
Start: 2022-04-25 | End: 2022-05-23 | Stop reason: SDUPTHER

## 2022-04-27 ENCOUNTER — OFFICE VISIT (OUTPATIENT)
Dept: FAMILY MEDICINE CLINIC | Facility: CLINIC | Age: 67
End: 2022-04-27
Payer: COMMERCIAL

## 2022-04-27 VITALS
WEIGHT: 137 LBS | HEART RATE: 83 BPM | TEMPERATURE: 98 F | BODY MASS INDEX: 18.56 KG/M2 | OXYGEN SATURATION: 94 % | SYSTOLIC BLOOD PRESSURE: 142 MMHG | DIASTOLIC BLOOD PRESSURE: 80 MMHG | HEIGHT: 72 IN

## 2022-04-27 DIAGNOSIS — R91.8 ABNORMAL CT SCAN OF LUNG: ICD-10-CM

## 2022-04-27 DIAGNOSIS — E87.1 HYPONATREMIA: ICD-10-CM

## 2022-04-27 DIAGNOSIS — R91.1 NODULE OF UPPER LOBE OF RIGHT LUNG: ICD-10-CM

## 2022-04-27 DIAGNOSIS — J43.9 PULMONARY EMPHYSEMA, UNSPECIFIED EMPHYSEMA TYPE (HCC): ICD-10-CM

## 2022-04-27 DIAGNOSIS — Z09 HOSPITAL DISCHARGE FOLLOW-UP: Primary | ICD-10-CM

## 2022-04-27 DIAGNOSIS — J44.9 CHRONIC OBSTRUCTIVE PULMONARY DISEASE, UNSPECIFIED COPD TYPE (HCC): ICD-10-CM

## 2022-04-27 DIAGNOSIS — D50.0 IRON DEFICIENCY ANEMIA DUE TO CHRONIC BLOOD LOSS: ICD-10-CM

## 2022-04-27 PROCEDURE — 3008F BODY MASS INDEX DOCD: CPT | Performed by: FAMILY MEDICINE

## 2022-04-27 PROCEDURE — 99495 TRANSJ CARE MGMT MOD F2F 14D: CPT | Performed by: FAMILY MEDICINE

## 2022-04-27 RX ORDER — FERROUS SULFATE 325(65) MG
1 TABLET ORAL
COMMUNITY
Start: 2022-04-15 | End: 2022-05-25

## 2022-04-27 NOTE — PROGRESS NOTES
Assessment/Plan:     1  Chronic obstructive pulmonary disease, unspecified COPD type (HCC)/Emphysema/Abnormal CT scan of lung/3  Nodule of upper lobe of right lung  - recommended PET CT per last CT of chest in the summer  This was not done, unsure why  Will reorder  Pt cont to have low sodium, unsure why  Long term smoker  He is losing weight over the past one year as well  Should have further imaging  He is on regimen, adjusted in the hospital   Can certainly consider pulmonary referral in f/u as well  He is abusing his albuterol, does not appear well controlled at baseline  Pre-contemplative around quitting tobacco     - NM PET CT skull base to mid thigh; Future    5  Iron deficiency anemia due to chronic blood loss  -- he is with significant anemia and this is worsening over the past 6 months (was normal in the Fall)  He did have colonoscopy in December, no e/o acute blood loss per report  We will ask that he call GI and see if they need to consider another colonoscopy and EGD given this decline  Does have h/o GERD  Is on PPI  He is smoking  Taking daily iron and reports feeling better  D/c HGB per chart review was less than 8  He did receive transfusion in the hospital and also iron transfusion  Was not set up with referrals for hematology, etc , on d/c  No acute worsening weakness or sob  - CBC and differential; Future    6  Hospital discharge follow-up  - will recheck CBC and ask that he see GI  Will need to have close monitoring of his COPD as well   meds adjusted  Cont to smoke, unfortunately  Will do f/u PET/CT if we can get this done for nodule found on CT in July in the setting of Hyponatremia    He has f/u with his PCP in June  He is to keep this  See us sooner or ED for acute changes  I will be in touch regarding lab findings  Patient/Caretaker verbalized understanding and were in agreement with today's assessment and plan    Time was taken to address any questions patient/caretaker had  Indication/Risks/Benefits of medication(s) as prescribed were discussed with the patient/caretaker  The patient verbalized understanding and agreement and elects to take medications as prescribed  Time was taken to answer any questions the patient/caretaker may have had  Chief Complaint   Patient presents with    Transition of Care Management     was in [facility] for pneumonia       Subjective:     Patient ID: Loida Villagran is a 74 y o  male  Admitted 4/9-->4/15 at   PNA/Chronic Blood Loss - Iron deficiency anemia  Admits to feeling a bit better on his med adjustments - Trelegy stopped and Advair started  He is using his albuterol inhaler and nebs several times/day  Cont to smoke  Pre-contemplative around quitting  He acknowledges this is not in his best interest   He is with weight loss over the past one year, unexplained  He is with cough and daily sob/wheeze  He denies BRBPR/melena  He is with fatigue, but improved since d/c on his iron  Tobacco Abuse -- 1 ppd, many years  Hospital Course  This is a brief description of the patient's hospital stay; please refer to medical chart for further details  Loida Villagran is 74 y o  male with past medical history as mentioned above  He presented to [facility] on 4/9/2022 with complaint of shortness of breath  He was diagnosed with pneumonia  The patient had initial leukocytosis and was started on azithromycin as well as ceftriaxone for pneumonia  He was initially on 2 L nasal cannula that was appropriately weaned to room air and the patient maintained his saturation on room air  Further work-up showed that the patient was anemic  His initial hemoglobin was 8 0 recheck was 6 9 he did receive a unit of blood while he was admitted  His further work-up showed that he he had microcytic anemia and iron deficiency  He was given Venofer 200 mg IV x3 doses as well as started on ferrous sulfate   The patient was educated on importance of continue his ferrous sulfate as an outpatient  His hemoglobin remained stable  Regards to his chronic back pain  The patient takes Percocet  mg every 6 hours on a routine basis  Patient stated that he has tried other medications such as a lidocaine patch as well as other therapies which do not work  Patient was educated on the dangers of taking this much Percocet and the risks of the liver which the patient did understand  All of his questions were answered at bedside and medication reconciliation was personally reviewed with patient  He was instructed to follow-up with his doctor in 2 weeks as well as get a CBC and BMP within 1 week  Members of the care team were updated on treatment plan  Review of Systems   All other systems reviewed and are negative  Objective:     Physical Exam  Vitals and nursing note reviewed  Constitutional:       General: He is not in acute distress  Appearance: Normal appearance  Comments: Very thin, slender build  Has body habitus of a pt with emphysema    HENT:      Head: Normocephalic and atraumatic  Eyes:      Conjunctiva/sclera: Conjunctivae normal    Cardiovascular:      Rate and Rhythm: Normal rate and regular rhythm  Pulmonary:      Effort: Pulmonary effort is normal       Comments: There is scattered wheeze and rhonci; O2 sat is stable on RA   Abdominal:      Palpations: Abdomen is soft  Skin:     General: Skin is warm and dry  Comments: He is pale in nature    Neurological:      General: No focal deficit present  Mental Status: He is alert and oriented to person, place, and time  Psychiatric:         Mood and Affect: Mood normal          Behavior: Behavior normal          Thought Content:  Thought content normal          Judgment: Judgment normal            Vitals:    04/27/22 0834   BP: 142/80   Pulse: 83   Temp: 98 °F (36 7 °C)   SpO2: 94%   Weight: 62 1 kg (137 lb)   Height: 6' (1 829 m)       Transitional Care Management Review:  Chaitanya Akbar is a 74 y o  male here for TCM follow up  During the TCM phone call patient stated:    TCM Call (since 3/27/2022)     Date and time call was made  4/20/2022  2:42 PM    Hospital care reviewed  Records reviewed        Patient was hospitialized at  Other (comment)        Comment  [facility]    Date of Admission  04/09/22    Date of discharge  04/15/22    Diagnosis  pneumonia left upper lobe    Disposition  Home    Were the patients medications reviewed and updated  Yes    Current Symptoms  None      TCM Call (since 3/27/2022)     Post hospital issues  None    Should patient be enrolled in anticoag monitoring? No    Scheduled for follow up?   Yes    Did you obtain your prescribed medications  Yes    Do you need help managing your prescriptions or medications  No    Is transportation to your appointment needed  No    I have advised the patient to call PCP with any new or worsening symptoms  Slim Lindsay, [address] [place] [address], DO

## 2022-04-27 NOTE — PATIENT INSTRUCTIONS
Call Dr Olive Mcmullen today and tell them you need an appointment given your severe, new onset, iron deficiency anemia!

## 2022-04-28 ENCOUNTER — TELEPHONE (OUTPATIENT)
Dept: OTHER | Facility: OTHER | Age: 67
End: 2022-04-28

## 2022-04-28 ENCOUNTER — APPOINTMENT (OUTPATIENT)
Dept: LAB | Facility: CLINIC | Age: 67
End: 2022-04-28
Payer: COMMERCIAL

## 2022-04-28 DIAGNOSIS — I25.10 ASCVD (ARTERIOSCLEROTIC CARDIOVASCULAR DISEASE): ICD-10-CM

## 2022-04-28 DIAGNOSIS — J44.9 CHRONIC OBSTRUCTIVE PULMONARY DISEASE, UNSPECIFIED COPD TYPE (HCC): ICD-10-CM

## 2022-04-28 DIAGNOSIS — D50.0 IRON DEFICIENCY ANEMIA DUE TO CHRONIC BLOOD LOSS: ICD-10-CM

## 2022-04-28 DIAGNOSIS — J43.9 PULMONARY EMPHYSEMA, UNSPECIFIED EMPHYSEMA TYPE (HCC): ICD-10-CM

## 2022-04-28 DIAGNOSIS — E44.0 MODERATE PROTEIN-CALORIE MALNUTRITION (HCC): ICD-10-CM

## 2022-04-28 DIAGNOSIS — F41.9 ANXIETY: ICD-10-CM

## 2022-04-28 DIAGNOSIS — M17.0 PRIMARY OSTEOARTHRITIS OF BOTH KNEES: ICD-10-CM

## 2022-04-28 LAB
ALBUMIN SERPL BCP-MCNC: 2.9 G/DL (ref 3.5–5)
ALP SERPL-CCNC: 136 U/L (ref 46–116)
ALT SERPL W P-5'-P-CCNC: 25 U/L (ref 12–78)
ANION GAP SERPL CALCULATED.3IONS-SCNC: 5 MMOL/L (ref 4–13)
AST SERPL W P-5'-P-CCNC: 18 U/L (ref 5–45)
BASOPHILS # BLD AUTO: 0.12 THOUSANDS/ΜL (ref 0–0.1)
BASOPHILS NFR BLD AUTO: 2 % (ref 0–1)
BILIRUB SERPL-MCNC: 0.29 MG/DL (ref 0.2–1)
BUN SERPL-MCNC: 11 MG/DL (ref 5–25)
CALCIUM ALBUM COR SERPL-MCNC: 9.8 MG/DL (ref 8.3–10.1)
CALCIUM SERPL-MCNC: 8.9 MG/DL (ref 8.3–10.1)
CHLORIDE SERPL-SCNC: 103 MMOL/L (ref 100–108)
CHOLEST SERPL-MCNC: 100 MG/DL
CO2 SERPL-SCNC: 27 MMOL/L (ref 21–32)
CREAT SERPL-MCNC: 0.85 MG/DL (ref 0.6–1.3)
EOSINOPHIL # BLD AUTO: 0.18 THOUSAND/ΜL (ref 0–0.61)
EOSINOPHIL NFR BLD AUTO: 3 % (ref 0–6)
ERYTHROCYTE [DISTWIDTH] IN BLOOD BY AUTOMATED COUNT: 27.9 % (ref 11.6–15.1)
GFR SERPL CREATININE-BSD FRML MDRD: 89 ML/MIN/1.73SQ M
GLUCOSE P FAST SERPL-MCNC: 121 MG/DL (ref 65–99)
HCT VFR BLD AUTO: 32.3 % (ref 36.5–49.3)
HDLC SERPL-MCNC: 43 MG/DL
HGB BLD-MCNC: 9.6 G/DL (ref 12–17)
IMM GRANULOCYTES # BLD AUTO: 0.02 THOUSAND/UL (ref 0–0.2)
IMM GRANULOCYTES NFR BLD AUTO: 0 % (ref 0–2)
LDLC SERPL CALC-MCNC: 35 MG/DL (ref 0–100)
LYMPHOCYTES # BLD AUTO: 0.95 THOUSANDS/ΜL (ref 0.6–4.47)
LYMPHOCYTES NFR BLD AUTO: 15 % (ref 14–44)
MCH RBC QN AUTO: 23.9 PG (ref 26.8–34.3)
MCHC RBC AUTO-ENTMCNC: 29.7 G/DL (ref 31.4–37.4)
MCV RBC AUTO: 80 FL (ref 82–98)
MONOCYTES # BLD AUTO: 0.86 THOUSAND/ΜL (ref 0.17–1.22)
MONOCYTES NFR BLD AUTO: 13 % (ref 4–12)
NEUTROPHILS # BLD AUTO: 4.35 THOUSANDS/ΜL (ref 1.85–7.62)
NEUTS SEG NFR BLD AUTO: 67 % (ref 43–75)
NONHDLC SERPL-MCNC: 57 MG/DL
NRBC BLD AUTO-RTO: 0 /100 WBCS
PLATELET # BLD AUTO: 558 THOUSANDS/UL (ref 149–390)
PMV BLD AUTO: 8.7 FL (ref 8.9–12.7)
POTASSIUM SERPL-SCNC: 4.7 MMOL/L (ref 3.5–5.3)
PROT SERPL-MCNC: 7.3 G/DL (ref 6.4–8.2)
RBC # BLD AUTO: 4.02 MILLION/UL (ref 3.88–5.62)
SODIUM SERPL-SCNC: 135 MMOL/L (ref 136–145)
TRIGL SERPL-MCNC: 109 MG/DL
TSH SERPL DL<=0.05 MIU/L-ACNC: 0.75 UIU/ML (ref 0.45–4.5)
WBC # BLD AUTO: 6.48 THOUSAND/UL (ref 4.31–10.16)

## 2022-04-28 PROCEDURE — 36415 COLL VENOUS BLD VENIPUNCTURE: CPT

## 2022-04-28 PROCEDURE — 80061 LIPID PANEL: CPT

## 2022-04-28 PROCEDURE — 85025 COMPLETE CBC W/AUTO DIFF WBC: CPT

## 2022-04-28 PROCEDURE — 80053 COMPREHEN METABOLIC PANEL: CPT

## 2022-04-28 PROCEDURE — 84443 ASSAY THYROID STIM HORMONE: CPT

## 2022-04-28 NOTE — TELEPHONE ENCOUNTER
Janneth Huang (Self) [phone] (M)     Is requestting a call back from [facility] regarding the      PET CT per last CT of chest in the summer  This was not done, unsure why  Will reorder  Pt cont to have low sodium, unsure why  Eddie Judge

## 2022-05-01 DIAGNOSIS — M47.816 LUMBAR SPONDYLOSIS: ICD-10-CM

## 2022-05-01 DIAGNOSIS — I10 BENIGN ESSENTIAL HYPERTENSION: ICD-10-CM

## 2022-05-02 RX ORDER — ALBUTEROL SULFATE 2.5 MG/3ML
2.5 SOLUTION RESPIRATORY (INHALATION) EVERY 4 HOURS PRN
Qty: 300 ML | Refills: 0 | Status: SHIPPED | OUTPATIENT
Start: 2022-05-02

## 2022-05-23 DIAGNOSIS — M47.816 LUMBAR SPONDYLOSIS: ICD-10-CM

## 2022-05-23 DIAGNOSIS — G89.4 CHRONIC PAIN SYNDROME: ICD-10-CM

## 2022-05-23 RX ORDER — OXYCODONE AND ACETAMINOPHEN 10; 325 MG/1; MG/1
1 TABLET ORAL EVERY 6 HOURS PRN
Qty: 120 TABLET | Refills: 0 | Status: SHIPPED | OUTPATIENT
Start: 2022-05-23 | End: 2022-06-20 | Stop reason: SDUPTHER

## 2022-05-24 DIAGNOSIS — I10 BENIGN ESSENTIAL HYPERTENSION: Primary | ICD-10-CM

## 2022-05-25 RX ORDER — FERROUS SULFATE 325(65) MG
TABLET ORAL
Qty: 30 TABLET | Refills: 5 | Status: SHIPPED | OUTPATIENT
Start: 2022-05-25

## 2022-05-26 DIAGNOSIS — G47.00 INSOMNIA, UNSPECIFIED TYPE: ICD-10-CM

## 2022-05-26 RX ORDER — CLOPIDOGREL BISULFATE 75 MG/1
TABLET ORAL
Qty: 90 TABLET | Refills: 0 | Status: SHIPPED | OUTPATIENT
Start: 2022-05-26

## 2022-06-13 ENCOUNTER — OFFICE VISIT (OUTPATIENT)
Dept: FAMILY MEDICINE CLINIC | Facility: CLINIC | Age: 67
End: 2022-06-13
Payer: COMMERCIAL

## 2022-06-13 VITALS
HEIGHT: 72 IN | HEART RATE: 80 BPM | OXYGEN SATURATION: 98 % | SYSTOLIC BLOOD PRESSURE: 118 MMHG | DIASTOLIC BLOOD PRESSURE: 70 MMHG | TEMPERATURE: 98.1 F | RESPIRATION RATE: 18 BRPM | BODY MASS INDEX: 17.01 KG/M2 | WEIGHT: 125.6 LBS

## 2022-06-13 DIAGNOSIS — G89.4 CHRONIC PAIN SYNDROME: ICD-10-CM

## 2022-06-13 DIAGNOSIS — G89.21 CHRONIC PAIN DUE TO TRAUMA: ICD-10-CM

## 2022-06-13 DIAGNOSIS — I10 BENIGN ESSENTIAL HYPERTENSION: ICD-10-CM

## 2022-06-13 DIAGNOSIS — Z79.899 HIGH RISK MEDICATION USE: ICD-10-CM

## 2022-06-13 DIAGNOSIS — F17.200 SMOKER: Primary | ICD-10-CM

## 2022-06-13 DIAGNOSIS — Z79.899 CONTROLLED SUBSTANCE AGREEMENT SIGNED: ICD-10-CM

## 2022-06-13 DIAGNOSIS — Z79.899 OTHER LONG TERM (CURRENT) DRUG THERAPY: ICD-10-CM

## 2022-06-13 DIAGNOSIS — G89.29 OTHER CHRONIC PAIN: ICD-10-CM

## 2022-06-13 DIAGNOSIS — D50.9 IRON DEFICIENCY ANEMIA, UNSPECIFIED IRON DEFICIENCY ANEMIA TYPE: ICD-10-CM

## 2022-06-13 DIAGNOSIS — M17.0 PRIMARY OSTEOARTHRITIS OF BOTH KNEES: ICD-10-CM

## 2022-06-13 DIAGNOSIS — F11.20 UNCOMPLICATED OPIOID DEPENDENCE (HCC): ICD-10-CM

## 2022-06-13 DIAGNOSIS — F11.20 CONTINUOUS OPIOID DEPENDENCE (HCC): ICD-10-CM

## 2022-06-13 DIAGNOSIS — I25.10 ASCVD (ARTERIOSCLEROTIC CARDIOVASCULAR DISEASE): ICD-10-CM

## 2022-06-13 DIAGNOSIS — J43.9 PULMONARY EMPHYSEMA, UNSPECIFIED EMPHYSEMA TYPE (HCC): ICD-10-CM

## 2022-06-13 DIAGNOSIS — E44.0 MODERATE PROTEIN-CALORIE MALNUTRITION (HCC): ICD-10-CM

## 2022-06-13 DIAGNOSIS — F41.9 ANXIETY: ICD-10-CM

## 2022-06-13 PROCEDURE — 3008F BODY MASS INDEX DOCD: CPT | Performed by: FAMILY MEDICINE

## 2022-06-13 PROCEDURE — 99214 OFFICE O/P EST MOD 30 MIN: CPT | Performed by: FAMILY MEDICINE

## 2022-06-13 PROCEDURE — 1160F RVW MEDS BY RX/DR IN RCRD: CPT | Performed by: FAMILY MEDICINE

## 2022-06-13 PROCEDURE — 4004F PT TOBACCO SCREEN RCVD TLK: CPT | Performed by: FAMILY MEDICINE

## 2022-06-13 RX ORDER — VARENICLINE TARTRATE 1 MG/1
TABLET, FILM COATED ORAL
Qty: 65 TABLET | Refills: 0 | Status: SHIPPED | OUTPATIENT
Start: 2022-06-13 | End: 2022-07-19

## 2022-06-13 NOTE — PROGRESS NOTES
Assessment/Plan:    No problem-specific Assessment & Plan notes found for this encounter  Diagnoses and all orders for this visit:    Smoker  -     varenicline (CHANTIX) 1 mg tablet; Take 0 5 tablets (0 5 mg total) by mouth daily for 3 days, THEN 0 5 tablets (0 5 mg total) 2 (two) times a day for 3 days, THEN 1 tablet (1 mg total) 2 (two) times a day  Pulmonary emphysema, unspecified emphysema type ([facility] )    Benign essential hypertension    ASCVD (arteriosclerotic cardiovascular disease)    Primary osteoarthritis of both knees    Anxiety    Uncomplicated opioid dependence ([facility] )    Controlled substance agreement signed    Moderate protein-calorie malnutrition ([place] [id number] )    Iron deficiency anemia, unspecified iron deficiency anemia type  -     Ambulatory Referral to Gastroenterology;  Future    Continuous opioid dependence (HCC)  -     [facility] Prescribed Meds  -     Amphetamines, Methamphetamines  -     Butalbital  -     Phenobarbital  -     Secobarbital  -     Temazepam  -     Alprazolam  -     Clonazepam  -     Diazepam  -     Lorazepam  -     Oxazepam  -     Gabapentin  -     Pregabalin  -     Cocaine  -     Heroin  -     Buprenorphine  -     Levorphanol  -     Meperidine  -     Naltrexone  -     Fentanyl  -     Methadone  -     Oxycodone  -     Oxymorphone  -     Tapentadol  -     Tramadol  -     Codeine, Hydrocodone, Hydropmorphone, Morphine  -     Bath Salts  -     Kratom  -     Spice  -     Methylphenidate  -     Phentermine  -     Validity Oxidant  -     Validity Creatinine  -     Validity pH  -     Validity Specific    Chronic pain due to trauma  -     [facility] Prescribed Meds  -     Amphetamines, Methamphetamines  -     Butalbital  -     Phenobarbital  -     Secobarbital  -     Temazepam  -     Alprazolam  -     Clonazepam  -     Diazepam  -     Lorazepam  -     Oxazepam  -     Gabapentin  -     Pregabalin  -     Cocaine  -     Heroin  -     Buprenorphine  -     Levorphanol  - Meperidine  -     Naltrexone  -     Fentanyl  -     Methadone  -     Oxycodone  -     Oxymorphone  -     Tapentadol  -     Tramadol  -     Codeine, Hydrocodone, Hydropmorphone, Morphine  -     Bath Salts  -     Kratom  -     Spice  -     Methylphenidate  -     Phentermine  -     Validity Oxidant  -     Validity Creatinine  -     Validity pH  -     Validity Specific    Chronic pain syndrome  -     [facility] All Prescribed Meds  -     Amphetamines, Methamphetamines  -     Butalbital  -     Phenobarbital  -     Secobarbital  -     Temazepam  -     Alprazolam  -     Clonazepam  -     Diazepam  -     Lorazepam  -     Oxazepam  -     Gabapentin  -     Pregabalin  -     Cocaine  -     Heroin  -     Buprenorphine  -     Levorphanol  -     Meperidine  -     Naltrexone  -     Fentanyl  -     Methadone  -     Oxycodone  -     Oxymorphone  -     Tapentadol  -     Tramadol  -     Codeine, Hydrocodone, Hydropmorphone, Morphine  -     Bath Salts  -     Kratom  -     Spice  -     Methylphenidate  -     Phentermine  -     Validity Oxidant  -     Validity Creatinine  -     Validity pH  -     Validity Specific    High risk medication use  -     [facility] All Prescribed Meds  -     Amphetamines, Methamphetamines  -     Butalbital  -     Phenobarbital  -     Secobarbital  -     Temazepam  -     Alprazolam  -     Clonazepam  -     Diazepam  -     Lorazepam  -     Oxazepam  -     Gabapentin  -     Pregabalin  -     Cocaine  -     Heroin  -     Buprenorphine  -     Levorphanol  -     Meperidine  -     Naltrexone  -     Fentanyl  -     Methadone  -     Oxycodone  -     Oxymorphone  -     Tapentadol  -     Tramadol  -     Codeine, Hydrocodone, Hydropmorphone, Morphine  -     Bath Salts  -     Kratom  -     Spice  -     Methylphenidate  -     Phentermine  -     Validity Oxidant  -     Validity Creatinine  -     Validity pH  -     Validity Specific    Other chronic pain  -     [facility] All Prescribed Meds  -     Amphetamines, Methamphetamines  -     Butalbital  -     Phenobarbital  -     Secobarbital  -     Temazepam  -     Alprazolam  -     Clonazepam  -     Diazepam  -     Lorazepam  -     Oxazepam  -     Gabapentin  -     Pregabalin  -     Cocaine  -     Heroin  -     Buprenorphine  -     Levorphanol  -     Meperidine  -     Naltrexone  -     Fentanyl  -     Methadone  -     Oxycodone  -     Oxymorphone  -     Tapentadol  -     Tramadol  -     Codeine, Hydrocodone, Hydropmorphone, Morphine  -     Bath Salts  -     Kratom  -     Spice  -     Methylphenidate  -     Phentermine  -     Validity Oxidant  -     Validity Creatinine  -     Validity pH  -     Validity Specific    Other long term (current) drug therapy          Subjective:      Patient ID: Shubham Darden is a 74 y o  male  He has COPD  He smokes a pack a day  He states that he has no symptoms as long as he is faithful with his inhaler  He has no العلي  He denies cough, wheeze, or sputum production  His BP is under good control  He has no CP or SOB  He has no HA or vision changes  His lipids are at goal on his current regimen  His liver function testing is normal and he has no increased myalgia or myalgia  He was admitted to [facility] for pneumonia at the end of April for pneumonia  He was found to have microcytic anemia  He is taking iron but has not followed up with GI  He has chronic pain (low back)  He is wearing a back brace  He finished PT  He is not a candidate for NSAIDs (GI bleeding) and has failed Tylenol  He is on Cymbalta for pain as well  He has a signed treatment agreement in the chart  He has urine toxicology consistent with regular use of his prescribed medications        The following portions of the patient's history were reviewed and updated as appropriate:   He  has a past medical history of Anxiety, Chronic pain disorder, COPD (chronic obstructive pulmonary disease) (HCC) (na), Disease of thyroid gland, GERD (gastroesophageal reflux disease), Headache(784 0), Hyperlipidemia, Hypertension, Hyperthyroidism, NSTEMI (non-ST elevated myocardial infarction) ([facility] ) (4/13/2020), Pneumonia, and Visual impairment  He   Patient Active Problem List    Diagnosis Date Noted    Moderate protein-calorie malnutrition ([facility] ) 03/04/2022    Sacroiliitis, not elsewhere classified ([facility] ) 03/04/2022    Controlled substance agreement signed 11/02/2021    Tobacco use 11/02/2021    Hyponatremia 11/02/2021    ASCVD (arteriosclerotic cardiovascular disease) 04/15/2021    Abnormal CT scan of lung 10/15/2020    Upper respiratory tract infection 66/32/8205    Uncomplicated opioid dependence ([facility] ) 01/21/2020    Lumbar radiculopathy 02/26/2019    Screening for AAA (abdominal aortic aneurysm) 01/18/2019    Medicare annual wellness visit, initial 01/18/2019    Low back pain 12/19/2018    Displacement of lumbar intervertebral disc without myelopathy 12/19/2018    Lumbar spondylosis 12/14/2018    Bulging of lumbar intervertebral disc 12/14/2018    Screening for neurological condition 10/18/2018    Encounter for hepatitis C screening test for low risk patient 04/13/2018    Pain 04/13/2018    Benign essential hypertension 01/15/2018    Osteoarthritis of both knees 10/18/2017    Other instability, right knee 10/18/2017    Left knee pain 09/09/2016    Pain of left hip 09/09/2016    Neck pain 08/05/2016    Pain in joint of left shoulder 08/05/2016    Other chest pain 06/07/2016    Chronic low back pain 10/08/2015    Insomnia 09/29/2015    Early satiety 02/26/2015    GERD (gastroesophageal reflux disease) 10/31/2014    Headache 05/16/2014    Incisional hernia 10/11/2013    Weight loss, non-intentional 10/04/2013    Fatigue 06/26/2013    Anxiety 11/14/2012    Chronic obstructive pulmonary disease ([facility] ) 11/14/2012     He  has a past surgical history that includes Inguinal hernia repair (Bilateral); Epidural block injection (N/A, 1/17/2019);  Epidural block injection (Left, 3/6/2019); FL guided needle plac bx/asp/inj (3/6/2019); Colonoscopy; Cardiac catheterization (07/02/2020); pr injection,sacroiliac joint (Left, 6/24/2021); Epidural block injection (Left, 8/3/2021); and Radiofrequency ablation (Left, 3/29/2022)  His family history includes Heart disease in his father, maternal grandfather, maternal grandmother, mother, paternal grandfather, and paternal grandmother  He  reports that he has been smoking cigarettes  He has a 15 00 pack-year smoking history  He has never used smokeless tobacco  He reports that he does not drink alcohol and does not use drugs  Current Outpatient Medications   Medication Sig Dispense Refill    albuterol (2 5 mg/3 mL) 0 083 % nebulizer solution Take 3 mL (2 5 mg total) by nebulization every 4 (four) hours as needed for wheezing or shortness of breath 300 mL 0    albuterol (PROVENTIL HFA,VENTOLIN HFA) 90 mcg/act inhaler Inhale 2 puffs every 4 (four) hours as needed for wheezing 18 g 5    ALPRAZolam (XANAX) 0 25 mg tablet Take 1 tablet (0 25 mg total) by mouth 3 (three) times a day as needed for anxiety 90 tablet 5    Aspirin Low Dose 81 MG EC tablet take 1 tablet by mouth once daily 90 tablet 0    atorvastatin (LIPITOR) 80 mg tablet take 1 tablet by mouth every evening 90 tablet 3    clopidogrel (PLAVIX) 75 mg tablet take 1 tablet by mouth once daily 90 tablet 0    Diclofenac Sodium (VOLTAREN) 1 % Apply 4 g topically 4 (four) times a day 500 g 0    DULoxetine (CYMBALTA) 60 mg delayed release capsule take 1 capsule by mouth once daily 90 capsule 3    FeroSul 325 (65 Fe) MG tablet take 1 tablet every morning BEFORE  BREAKFAST 30 tablet 5    fluticasone-salmeterol (Advair Diskus) 250-50 mcg/dose inhaler Inhale 1 puff 2 (two) times a day Rinse mouth after use   60 blister 1    losartan (COZAAR) 25 mg tablet Take 1 tablet (25 mg total) by mouth daily 90 tablet 3    metoprolol succinate (TOPROL-XL) 25 mg 24 hr tablet Take 1 tablet (25 mg total) by mouth daily 90 tablet 0    naloxone (NARCAN) 4 mg/0 1 mL nasal spray Administer 1 spray into a nostril  If no response after 2-3 minutes, give another dose in the other nostril using a new spray  1 each 1    oxyCODONE-acetaminophen (PERCOCET)  mg per tablet Take 1 tablet by mouth every 6 (six) hours as needed for moderate pain Max Daily Amount: 4 tablets 120 tablet 0    pantoprazole (PROTONIX) 40 mg tablet Take 1 tablet (40 mg total) by mouth daily 90 tablet 0    varenicline (CHANTIX) 1 mg tablet Take 0 5 tablets (0 5 mg total) by mouth daily for 3 days, THEN 0 5 tablets (0 5 mg total) 2 (two) times a day for 3 days, THEN 1 tablet (1 mg total) 2 (two) times a day  65 tablet 0    zolpidem (AMBIEN) 5 mg tablet Take 1 tablet (5 mg total) by mouth daily at bedtime as needed for sleep 30 tablet 5     No current facility-administered medications for this visit       Current Outpatient Medications on File Prior to Visit   Medication Sig    albuterol (2 5 mg/3 mL) 0 083 % nebulizer solution Take 3 mL (2 5 mg total) by nebulization every 4 (four) hours as needed for wheezing or shortness of breath    albuterol (PROVENTIL HFA,VENTOLIN HFA) 90 mcg/act inhaler Inhale 2 puffs every 4 (four) hours as needed for wheezing    ALPRAZolam (XANAX) 0 25 mg tablet Take 1 tablet (0 25 mg total) by mouth 3 (three) times a day as needed for anxiety    Aspirin Low Dose 81 MG EC tablet take 1 tablet by mouth once daily    atorvastatin (LIPITOR) 80 mg tablet take 1 tablet by mouth every evening    clopidogrel (PLAVIX) 75 mg tablet take 1 tablet by mouth once daily    Diclofenac Sodium (VOLTAREN) 1 % Apply 4 g topically 4 (four) times a day    DULoxetine (CYMBALTA) 60 mg delayed release capsule take 1 capsule by mouth once daily    FeroSul 325 (65 Fe) MG tablet take 1 tablet every morning BEFORE  BREAKFAST    fluticasone-salmeterol (Advair Diskus) 250-50 mcg/dose inhaler Inhale 1 puff 2 (two) times a day Rinse mouth after use   losartan (COZAAR) 25 mg tablet Take 1 tablet (25 mg total) by mouth daily    metoprolol succinate (TOPROL-XL) 25 mg 24 hr tablet Take 1 tablet (25 mg total) by mouth daily    naloxone (NARCAN) 4 mg/0 1 mL nasal spray Administer 1 spray into a nostril  If no response after 2-3 minutes, give another dose in the other nostril using a new spray   oxyCODONE-acetaminophen (PERCOCET)  mg per tablet Take 1 tablet by mouth every 6 (six) hours as needed for moderate pain Max Daily Amount: 4 tablets    pantoprazole (PROTONIX) 40 mg tablet Take 1 tablet (40 mg total) by mouth daily    zolpidem (AMBIEN) 5 mg tablet Take 1 tablet (5 mg total) by mouth daily at bedtime as needed for sleep     No current facility-administered medications on file prior to visit  He has No Known Allergies       Review of Systems   All other systems reviewed and are negative  Objective:      /70   Pulse 80   Temp 98 1 °F (36 7 °C)   Resp 18   Ht 6' (1 829 m)   Wt 57 kg (125 lb 9 6 oz)   SpO2 98%   BMI 17 03 kg/m²          Physical Exam  Vitals and nursing note reviewed  Constitutional:       Appearance: Normal appearance  He is normal weight  HENT:      Head: Normocephalic and atraumatic  Cardiovascular:      Rate and Rhythm: Normal rate and regular rhythm  Pulses: Normal pulses  Heart sounds: Normal heart sounds  Pulmonary:      Effort: Pulmonary effort is normal       Breath sounds: Normal breath sounds  Abdominal:      General: Abdomen is flat  Bowel sounds are normal       Palpations: Abdomen is soft  Musculoskeletal:         General: Normal range of motion  Cervical back: Normal range of motion and neck supple  Skin:     General: Skin is warm and dry  Capillary Refill: Capillary refill takes less than 2 seconds  Neurological:      General: No focal deficit present  Mental Status: He is alert and oriented to person, place, and time   Mental status is at baseline  Psychiatric:         Mood and Affect: Mood normal          Behavior: Behavior normal          Thought Content:  Thought content normal          Judgment: Judgment normal

## 2022-06-13 NOTE — PATIENT INSTRUCTIONS

## 2022-06-13 NOTE — PROGRESS NOTES
BMI Counseling: Body mass index is 17 03 kg/m²  The BMI is above normal  Nutrition recommendations include reducing portion sizes, decreasing overall calorie intake, 3-5 servings of fruits/vegetables daily, reducing fast food intake, consuming healthier snacks, decreasing soda and/or juice intake, moderation in carbohydrate intake, increasing intake of lean protein, reducing intake of saturated fat and trans fat and reducing intake of cholesterol  Exercise recommendations include moderate aerobic physical activity for 150 minutes/week, vigorous aerobic physical activity for 75 minutes/week, exercising 3-5 times per week, joining a gym and strength training exercises

## 2022-06-15 LAB
6MAM UR QL CFM: NEGATIVE NG/ML
7AMINOCLONAZEPAM UR QL CFM: NEGATIVE NG/ML
A-OH ALPRAZ UR QL CFM: NORMAL NG/ML
ACCEPTABLE CREAT UR QL: NORMAL MG/DL
ACCEPTIBLE SP GR UR QL: NORMAL
AMPHET UR QL CFM: NEGATIVE NG/ML
BUPRENORPHINE UR QL CFM: NEGATIVE NG/ML
BUTALBITAL UR QL CFM: NEGATIVE NG/ML
BZE UR QL CFM: NEGATIVE NG/ML
CODEINE UR QL CFM: NEGATIVE NG/ML
EDDP UR QL CFM: NEGATIVE NG/ML
EUTYLONE UR QL: NEGATIVE NG/ML
FENTANYL UR QL CFM: NEGATIVE NG/ML
GLIADIN IGG SER IA-ACNC: NEGATIVE NG/ML
HYDROCODONE UR QL CFM: NEGATIVE NG/ML
HYDROMORPHONE UR QL CFM: NEGATIVE NG/ML
LORAZEPAM UR QL CFM: NEGATIVE NG/ML
ME-PHENIDATE UR QL CFM: NEGATIVE NG/ML
MEPERIDINE UR QL CFM: NEGATIVE NG/ML
METHADONE UR QL CFM: NEGATIVE NG/ML
METHAMPHET UR QL CFM: NEGATIVE NG/ML
MORPHINE UR QL CFM: NEGATIVE NG/ML
NALTREXONE UR QL CFM: NEGATIVE NG/ML
NITRITE UR QL: NORMAL UG/ML
NORBUPRENORPHINE UR QL CFM: NEGATIVE NG/ML
NORDIAZEPAM UR QL CFM: NEGATIVE NG/ML
NORFENTANYL UR QL CFM: NEGATIVE NG/ML
NORHYDROCODONE UR QL CFM: NEGATIVE NG/ML
NORMEPERIDINE UR QL CFM: NEGATIVE NG/ML
NOROXYCODONE UR QL CFM: NORMAL NG/ML
OXAZEPAM UR QL CFM: NEGATIVE NG/ML
OXYCODONE UR QL CFM: NORMAL NG/ML
OXYMORPHONE UR QL CFM: NORMAL NG/ML
OXYMORPHONE UR QL CFM: NORMAL NG/ML
PHENOBARB UR QL CFM: NEGATIVE NG/ML
RESULT ALL_PRESCRIBED MEDS AND SPECIAL INSTRUCTIONS: NORMAL
SECOBARBITAL UR QL CFM: NEGATIVE NG/ML
SL AMB 3-METHYL-FENTANYL QUANTIFICATION: NORMAL NG/ML
SL AMB 4-ANPP QUANTIFICATION: NORMAL NG/ML
SL AMB 4-FIBF QUANTIFICATION: NORMAL NG/ML
SL AMB 5F-ADB-M7 METABOLITE QUANTIFICATION: NEGATIVE NG/ML
SL AMB 7-OH-MITRAGYNINE (KRATOM ALKALOID) QUANTIFICATION: NEGATIVE NG/ML
SL AMB AB-FUBINACA-M3 METABOLITE QUANTIFICATION: NEGATIVE NG/ML
SL AMB ACETYL FENTANYL QUANTIFICATION: NORMAL NG/ML
SL AMB ACETYL NORFENTANYL QUANTIFICATION: NORMAL NG/ML
SL AMB ACRYL FENTANYL QUANTIFICATION: NORMAL NG/ML
SL AMB BUTRYL FENTANYL QUANTIFICATION: NORMAL NG/ML
SL AMB CARFENTANIL QUANTIFICATION: NORMAL NG/ML
SL AMB CYCLOPROPYL FENTANYL QUANTIFICATION: NORMAL NG/ML
SL AMB DEXTRORPHAN (DEXTROMETHORPHAN METABOLITE) QUANT: NEGATIVE NG/ML
SL AMB FURANYL FENTANYL QUANTIFICATION: NORMAL NG/ML
SL AMB GABAPENTIN QUANTIFICATION: NEGATIVE
SL AMB JWH018 METABOLITE QUANTIFICATION: NEGATIVE NG/ML
SL AMB JWH073 METABOLITE QUANTIFICATION: NEGATIVE NG/ML
SL AMB MDMB-FUBINACA-M1 METABOLITE QUANTIFICATION: NEGATIVE NG/ML
SL AMB METHOXYACETYL FENTANYL QUANTIFICATION: NORMAL NG/ML
SL AMB METHYLONE QUANTIFICATION: NEGATIVE NG/ML
SL AMB N-DESMETHYL U-47700 QUANTIFICATION: NORMAL NG/ML
SL AMB N-DESMETHYL-TRAMADOL QUANTIFICATION: NEGATIVE NG/ML
SL AMB PHENTERMINE QUANTIFICATION: NEGATIVE NG/ML
SL AMB PREGABALIN QUANTIFICATION: NEGATIVE
SL AMB RCS4 METABOLITE QUANTIFICATION: NEGATIVE NG/ML
SL AMB RITALINIC ACID QUANTIFICATION: NEGATIVE NG/ML
SL AMB U-47700 QUANTIFICATION: NORMAL NG/ML
SMOOTH MUSCLE AB TITR SER IF: NEGATIVE NG/ML
SPECIMEN DRAWN SERPL: NEGATIVE NG/ML
SPECIMEN PH ACCEPTABLE UR: NORMAL
TAPENTADOL UR QL CFM: NEGATIVE NG/ML
TEMAZEPAM UR QL CFM: NEGATIVE NG/ML
TEMAZEPAM UR QL CFM: NEGATIVE NG/ML
TRAMADOL UR QL CFM: NEGATIVE NG/ML
URATE/CREAT 24H UR: NEGATIVE NG/ML

## 2022-06-17 DIAGNOSIS — R52 PAIN: ICD-10-CM

## 2022-06-17 RX ORDER — PANTOPRAZOLE SODIUM 40 MG/1
TABLET, DELAYED RELEASE ORAL
Qty: 90 TABLET | Refills: 0 | Status: SHIPPED | OUTPATIENT
Start: 2022-06-17

## 2022-06-20 DIAGNOSIS — M47.816 LUMBAR SPONDYLOSIS: ICD-10-CM

## 2022-06-20 DIAGNOSIS — G89.4 CHRONIC PAIN SYNDROME: ICD-10-CM

## 2022-06-20 RX ORDER — OXYCODONE AND ACETAMINOPHEN 10; 325 MG/1; MG/1
1 TABLET ORAL EVERY 6 HOURS PRN
Qty: 120 TABLET | Refills: 0 | Status: SHIPPED | OUTPATIENT
Start: 2022-06-20 | End: 2022-07-18 | Stop reason: SDUPTHER

## 2022-07-05 DIAGNOSIS — I21.4 NSTEMI (NON-ST ELEVATED MYOCARDIAL INFARCTION) (HCC): ICD-10-CM

## 2022-07-05 DIAGNOSIS — J44.9 CHRONIC OBSTRUCTIVE PULMONARY DISEASE, UNSPECIFIED COPD TYPE (HCC): ICD-10-CM

## 2022-07-05 RX ORDER — ATORVASTATIN CALCIUM 80 MG/1
80 TABLET, FILM COATED ORAL EVERY EVENING
Qty: 90 TABLET | Refills: 0 | Status: SHIPPED | OUTPATIENT
Start: 2022-07-05 | End: 2022-09-29

## 2022-07-05 RX ORDER — ALBUTEROL SULFATE 90 UG/1
2 AEROSOL, METERED RESPIRATORY (INHALATION) EVERY 4 HOURS PRN
Qty: 18 G | Refills: 0 | Status: SHIPPED | OUTPATIENT
Start: 2022-07-05 | End: 2022-08-08

## 2022-07-08 DIAGNOSIS — G47.00 INSOMNIA, UNSPECIFIED TYPE: ICD-10-CM

## 2022-07-08 RX ORDER — ASPIRIN 81 MG/1
TABLET, COATED ORAL
Qty: 90 TABLET | Refills: 0 | Status: SHIPPED | OUTPATIENT
Start: 2022-07-08 | End: 2022-10-09

## 2022-07-18 DIAGNOSIS — G89.4 CHRONIC PAIN SYNDROME: ICD-10-CM

## 2022-07-18 DIAGNOSIS — M47.816 LUMBAR SPONDYLOSIS: ICD-10-CM

## 2022-07-18 RX ORDER — OXYCODONE AND ACETAMINOPHEN 10; 325 MG/1; MG/1
1 TABLET ORAL EVERY 6 HOURS PRN
Qty: 120 TABLET | Refills: 0 | Status: SHIPPED | OUTPATIENT
Start: 2022-07-18 | End: 2022-08-15 | Stop reason: SDUPTHER

## 2022-08-02 DIAGNOSIS — I25.10 ASCVD (ARTERIOSCLEROTIC CARDIOVASCULAR DISEASE): ICD-10-CM

## 2022-08-02 DIAGNOSIS — G47.00 INSOMNIA, UNSPECIFIED TYPE: ICD-10-CM

## 2022-08-03 RX ORDER — ZOLPIDEM TARTRATE 5 MG/1
5 TABLET ORAL
Qty: 30 TABLET | Refills: 0 | Status: SHIPPED | OUTPATIENT
Start: 2022-08-03 | End: 2022-09-01 | Stop reason: SDUPTHER

## 2022-08-03 RX ORDER — METOPROLOL SUCCINATE 25 MG/1
25 TABLET, EXTENDED RELEASE ORAL DAILY
Qty: 90 TABLET | Refills: 0 | Status: SHIPPED | OUTPATIENT
Start: 2022-08-03 | End: 2022-08-05

## 2022-08-03 RX ORDER — LOSARTAN POTASSIUM 25 MG/1
25 TABLET ORAL DAILY
Qty: 90 TABLET | Refills: 0 | Status: SHIPPED | OUTPATIENT
Start: 2022-08-03

## 2022-08-05 DIAGNOSIS — G47.00 INSOMNIA, UNSPECIFIED TYPE: ICD-10-CM

## 2022-08-05 RX ORDER — METOPROLOL SUCCINATE 25 MG/1
TABLET, EXTENDED RELEASE ORAL
Qty: 90 TABLET | Refills: 0 | Status: SHIPPED | OUTPATIENT
Start: 2022-08-05

## 2022-08-08 DIAGNOSIS — J44.9 CHRONIC OBSTRUCTIVE PULMONARY DISEASE, UNSPECIFIED COPD TYPE (HCC): ICD-10-CM

## 2022-08-08 RX ORDER — ALBUTEROL SULFATE 90 UG/1
AEROSOL, METERED RESPIRATORY (INHALATION)
Qty: 18 G | Refills: 0 | Status: SHIPPED | OUTPATIENT
Start: 2022-08-08 | End: 2022-09-13

## 2022-08-15 DIAGNOSIS — G89.4 CHRONIC PAIN SYNDROME: ICD-10-CM

## 2022-08-15 DIAGNOSIS — M47.816 LUMBAR SPONDYLOSIS: ICD-10-CM

## 2022-08-15 RX ORDER — OXYCODONE AND ACETAMINOPHEN 10; 325 MG/1; MG/1
1 TABLET ORAL EVERY 6 HOURS PRN
Qty: 120 TABLET | Refills: 0 | Status: SHIPPED | OUTPATIENT
Start: 2022-08-15 | End: 2022-09-12 | Stop reason: SDUPTHER

## 2022-08-20 DIAGNOSIS — G47.00 INSOMNIA, UNSPECIFIED TYPE: ICD-10-CM

## 2022-08-21 RX ORDER — CLOPIDOGREL BISULFATE 75 MG/1
TABLET ORAL
Qty: 90 TABLET | Refills: 0 | Status: SHIPPED | OUTPATIENT
Start: 2022-08-21

## 2022-09-01 DIAGNOSIS — G47.00 INSOMNIA, UNSPECIFIED TYPE: ICD-10-CM

## 2022-09-01 RX ORDER — ZOLPIDEM TARTRATE 5 MG/1
5 TABLET ORAL
Qty: 30 TABLET | Refills: 0 | Status: SHIPPED | OUTPATIENT
Start: 2022-09-01 | End: 2022-10-20

## 2022-09-12 DIAGNOSIS — J44.9 CHRONIC OBSTRUCTIVE PULMONARY DISEASE, UNSPECIFIED COPD TYPE (HCC): ICD-10-CM

## 2022-09-12 DIAGNOSIS — M47.816 LUMBAR SPONDYLOSIS: ICD-10-CM

## 2022-09-12 DIAGNOSIS — G89.4 CHRONIC PAIN SYNDROME: ICD-10-CM

## 2022-09-12 RX ORDER — OXYCODONE AND ACETAMINOPHEN 10; 325 MG/1; MG/1
1 TABLET ORAL EVERY 6 HOURS PRN
Qty: 120 TABLET | Refills: 0 | Status: SHIPPED | OUTPATIENT
Start: 2022-09-12 | End: 2022-10-10 | Stop reason: SDUPTHER

## 2022-09-13 RX ORDER — ALBUTEROL SULFATE 90 UG/1
AEROSOL, METERED RESPIRATORY (INHALATION)
Qty: 18 G | Refills: 0 | Status: SHIPPED | OUTPATIENT
Start: 2022-09-13 | End: 2022-10-20

## 2022-09-21 DIAGNOSIS — F41.9 ANXIETY: ICD-10-CM

## 2022-09-22 DIAGNOSIS — R52 PAIN: ICD-10-CM

## 2022-09-22 RX ORDER — ALPRAZOLAM 0.25 MG/1
TABLET ORAL
Qty: 90 TABLET | Refills: 5 | Status: SHIPPED | OUTPATIENT
Start: 2022-09-22

## 2022-09-22 RX ORDER — PANTOPRAZOLE SODIUM 40 MG/1
TABLET, DELAYED RELEASE ORAL
Qty: 90 TABLET | Refills: 0 | Status: SHIPPED | OUTPATIENT
Start: 2022-09-22

## 2022-09-29 DIAGNOSIS — I21.4 NSTEMI (NON-ST ELEVATED MYOCARDIAL INFARCTION) (HCC): ICD-10-CM

## 2022-09-29 RX ORDER — ATORVASTATIN CALCIUM 80 MG/1
TABLET, FILM COATED ORAL
Qty: 90 TABLET | Refills: 0 | Status: SHIPPED | OUTPATIENT
Start: 2022-09-29

## 2022-10-09 DIAGNOSIS — G47.00 INSOMNIA, UNSPECIFIED TYPE: ICD-10-CM

## 2022-10-09 RX ORDER — ASPIRIN 81 MG/1
TABLET, COATED ORAL
Qty: 90 TABLET | Refills: 0 | Status: SHIPPED | OUTPATIENT
Start: 2022-10-09

## 2022-10-10 DIAGNOSIS — M47.816 LUMBAR SPONDYLOSIS: ICD-10-CM

## 2022-10-10 DIAGNOSIS — G89.4 CHRONIC PAIN SYNDROME: ICD-10-CM

## 2022-10-10 RX ORDER — OXYCODONE AND ACETAMINOPHEN 10; 325 MG/1; MG/1
1 TABLET ORAL EVERY 6 HOURS PRN
Qty: 120 TABLET | Refills: 0 | Status: SHIPPED | OUTPATIENT
Start: 2022-10-10 | End: 2022-10-28 | Stop reason: SDUPTHER

## 2022-10-19 DIAGNOSIS — J44.9 CHRONIC OBSTRUCTIVE PULMONARY DISEASE, UNSPECIFIED COPD TYPE (HCC): ICD-10-CM

## 2022-10-19 DIAGNOSIS — G47.00 INSOMNIA, UNSPECIFIED TYPE: ICD-10-CM

## 2022-10-20 RX ORDER — ZOLPIDEM TARTRATE 5 MG/1
TABLET ORAL
Qty: 30 TABLET | Refills: 5 | Status: SHIPPED | OUTPATIENT
Start: 2022-10-20

## 2022-10-20 RX ORDER — ALBUTEROL SULFATE 90 UG/1
AEROSOL, METERED RESPIRATORY (INHALATION)
Qty: 18 G | Refills: 0 | Status: SHIPPED | OUTPATIENT
Start: 2022-10-20

## 2022-10-21 ENCOUNTER — RA CDI HCC (OUTPATIENT)
Dept: OTHER | Facility: HOSPITAL | Age: 67
End: 2022-10-21

## 2022-10-21 NOTE — PROGRESS NOTES
[facility]  coding opportunities       Chart reviewed, no opportunity found:   [address]        Patients Insurance     Medicare Insurance: [facility]

## 2022-10-26 ENCOUNTER — TELEPHONE (OUTPATIENT)
Dept: FAMILY MEDICINE CLINIC | Facility: CLINIC | Age: 67
End: 2022-10-26

## 2022-10-28 ENCOUNTER — OFFICE VISIT (OUTPATIENT)
Dept: FAMILY MEDICINE CLINIC | Facility: CLINIC | Age: 67
End: 2022-10-28

## 2022-10-28 VITALS
DIASTOLIC BLOOD PRESSURE: 80 MMHG | TEMPERATURE: 97 F | HEART RATE: 101 BPM | BODY MASS INDEX: 16.8 KG/M2 | SYSTOLIC BLOOD PRESSURE: 100 MMHG | WEIGHT: 124 LBS | OXYGEN SATURATION: 98 % | HEIGHT: 72 IN | RESPIRATION RATE: 18 BRPM

## 2022-10-28 DIAGNOSIS — Z00.00 MEDICARE ANNUAL WELLNESS VISIT, SUBSEQUENT: ICD-10-CM

## 2022-10-28 DIAGNOSIS — G89.4 CHRONIC PAIN SYNDROME: ICD-10-CM

## 2022-10-28 DIAGNOSIS — J44.1 COPD EXACERBATION (HCC): ICD-10-CM

## 2022-10-28 DIAGNOSIS — J44.9 CHRONIC OBSTRUCTIVE PULMONARY DISEASE, UNSPECIFIED COPD TYPE (HCC): ICD-10-CM

## 2022-10-28 DIAGNOSIS — I21.4 NSTEMI (NON-ST ELEVATED MYOCARDIAL INFARCTION) (HCC): Primary | ICD-10-CM

## 2022-10-28 DIAGNOSIS — M47.816 LUMBAR SPONDYLOSIS: ICD-10-CM

## 2022-10-28 DIAGNOSIS — I25.10 ASCVD (ARTERIOSCLEROTIC CARDIOVASCULAR DISEASE): ICD-10-CM

## 2022-10-28 DIAGNOSIS — R91.1 PULMONARY NODULE: ICD-10-CM

## 2022-10-28 RX ORDER — DOXYCYCLINE HYCLATE 100 MG/1
100 CAPSULE ORAL EVERY 12 HOURS SCHEDULED
Qty: 14 CAPSULE | Refills: 0 | Status: SHIPPED | OUTPATIENT
Start: 2022-10-28 | End: 2022-11-04

## 2022-10-28 RX ORDER — OXYCODONE AND ACETAMINOPHEN 10; 325 MG/1; MG/1
1 TABLET ORAL EVERY 6 HOURS PRN
Qty: 120 TABLET | Refills: 0 | Status: SHIPPED | OUTPATIENT
Start: 2022-10-28

## 2022-10-28 NOTE — PROGRESS NOTES
Name: Lisset Way      : 1955      MRN: [id number]  Encounter Provider: Henrry Bueno MD  Encounter Date: 10/28/2022   Encounter department: [address] [place] [address]     1  NSTEMI (non-ST elevated myocardial infarction) ([place] [address] )  -     Lipid panel; Future  -     Comprehensive metabolic panel; Future; Expected date: 10/29/2022  -     Iron Panel (Includes Ferritin, Iron Sat%, Iron, and TIBC); Future    2  ASCVD (arteriosclerotic cardiovascular disease)  -     Lipid panel; Future  -     Comprehensive metabolic panel; Future; Expected date: 10/29/2022  -     Iron Panel (Includes Ferritin, Iron Sat%, Iron, and TIBC); Future    3  Chronic obstructive pulmonary disease, unspecified COPD type ([place] [address] )  -     CT chest wo contrast; Future; Expected date: 10/28/2022    4  Pulmonary nodule  -     CT chest wo contrast; Future; Expected date: 10/28/2022    5  Lumbar spondylosis  -     oxyCODONE-acetaminophen (PERCOCET)  mg per tablet; Take 1 tablet by mouth every 6 (six) hours as needed for moderate pain Max Daily Amount: 4 tablets    6  Chronic pain syndrome  -     oxyCODONE-acetaminophen (PERCOCET)  mg per tablet; Take 1 tablet by mouth every 6 (six) hours as needed for moderate pain Max Daily Amount: 4 tablets    7  COPD exacerbation (HCC)  -     doxycycline hyclate (VIBRAMYCIN) 100 mg capsule; Take 1 capsule (100 mg total) by mouth every 12 (twelve) hours for 7 days           Subjective      His BP is at goal on his current regimen  He has no CP or SOB  He has no HA or vision changes  His lipids are at goal on his current regimen  He has no myalgia or muscle weakness  He has normal liver function testing  His COPD is stable  He has no increased cough or sputum production  He is not wheezing  Review of Systems   All other systems reviewed and are negative        Current Outpatient Medications on File Prior to Visit   Medication Sig   • albuterol (2 5 mg/3 mL) 0  083 % nebulizer solution Take 3 mL (2 5 mg total) by nebulization every 4 (four) hours as needed for wheezing or shortness of breath   • albuterol (PROVENTIL HFA,VENTOLIN HFA) 90 mcg/act inhaler inhale 2 puffs every 4 hours if needed for wheezing   • ALPRAZolam (XANAX) 0 25 mg tablet take 1 tablet by mouth three times a day if needed for anxiety   • Aspirin Low Dose 81 MG EC tablet take 1 tablet by mouth once daily   • atorvastatin (LIPITOR) 80 mg tablet take 1 tablet by mouth every evening   • clopidogrel (PLAVIX) 75 mg tablet take 1 tablet by mouth once daily   • Diclofenac Sodium (VOLTAREN) 1 % Apply 4 g topically 4 (four) times a day   • DULoxetine (CYMBALTA) 60 mg delayed release capsule take 1 capsule by mouth once daily   • FeroSul 325 (65 Fe) MG tablet take 1 tablet every morning BEFORE  BREAKFAST   • fluticasone-salmeterol (Advair Diskus) 250-50 mcg/dose inhaler Inhale 1 puff 2 (two) times a day Rinse mouth after use  • losartan (COZAAR) 25 mg tablet Take 1 tablet (25 mg total) by mouth daily   • metoprolol succinate (TOPROL-XL) 25 mg 24 hr tablet take 1 tablet by mouth once daily   • pantoprazole (PROTONIX) 40 mg tablet take 1 tablet by mouth once daily   • zolpidem (AMBIEN) 5 mg tablet take 1 tablet by mouth at bedtime if needed for sleep   • naloxone (NARCAN) 4 mg/0 1 mL nasal spray Administer 1 spray into a nostril  If no response after 2-3 minutes, give another dose in the other nostril using a new spray  (Patient not taking: Reported on 10/28/2022)       Objective     /80 (BP Location: Right arm, Patient Position: Sitting, Cuff Size: Standard)   Pulse 101   Temp (!) 97 °F (36 1 °C) (Temporal)   Resp 18   Ht 6' (1 829 m)   Wt 56 2 kg (124 lb)   SpO2 98%   BMI 16 82 kg/m²     Physical Exam  Vitals and nursing note reviewed  Constitutional:       Appearance: Normal appearance  Cardiovascular:      Rate and Rhythm: Normal rate and regular rhythm  Pulses: Normal pulses        Heart sounds: Normal heart sounds  Pulmonary:      Effort: Pulmonary effort is normal       Breath sounds: Normal breath sounds  Abdominal:      General: Abdomen is flat  Bowel sounds are normal       Palpations: Abdomen is soft  Musculoskeletal:         General: Normal range of motion  Cervical back: Normal range of motion and neck supple  Skin:     General: Skin is warm and dry  Capillary Refill: Capillary refill takes less than 2 seconds  Neurological:      General: No focal deficit present  Mental Status: He is alert and oriented to person, place, and time  Mental status is at baseline  Psychiatric:         Mood and Affect: Mood normal          Behavior: Behavior normal          Thought Content:  Thought content normal          Judgment: Judgment normal        Ilda Barrera MD

## 2022-10-31 DIAGNOSIS — I25.10 ASCVD (ARTERIOSCLEROTIC CARDIOVASCULAR DISEASE): ICD-10-CM

## 2022-10-31 RX ORDER — LOSARTAN POTASSIUM 25 MG/1
TABLET ORAL
Qty: 90 TABLET | Refills: 0 | Status: SHIPPED | OUTPATIENT
Start: 2022-10-31

## 2022-10-31 NOTE — PATIENT INSTRUCTIONS
Medicare Preventive Visit Patient Instructions  Thank you for completing your Welcome to Medicare Visit or Medicare Annual Wellness Visit today  Your next wellness visit will be due in one year (11/1/2023)  The screening/preventive services that you may require over the next 5-10 years are detailed below  Some tests may not apply to you based off risk factors and/or age  Screening tests ordered at today's visit but not completed yet may show as past due  Also, please note that scanned in results may not display below  Preventive Screenings:  Service Recommendations Previous Testing/Comments   Colorectal Cancer Screening  · Colonoscopy    · Fecal Occult Blood Test (FOBT)/Fecal Immunochemical Test (FIT)  · Fecal DNA/Cologuard Test  · Flexible Sigmoidoscopy Age: 39-70 years old   Colonoscopy: every 10 years (May be performed more frequently if at higher risk)  OR  FOBT/FIT: every 1 year  OR  Cologuard: every 3 years  OR  Sigmoidoscopy: every 5 years  Screening may be recommended earlier than age 39 if at higher risk for colorectal cancer  Also, an individualized decision between you and your healthcare provider will decide whether screening between the ages of 74-80 would be appropriate   Colonoscopy: 12/28/2021  FOBT/FIT: Not on file  Cologuard: Not on file  Sigmoidoscopy: Not on file    Screening Current     Prostate Cancer Screening Individualized decision between patient and health care provider in men between ages of 53-78   Medicare will cover every 12 months beginning on the day after your 50th birthday PSA: No results in last 5 years           Hepatitis C Screening Once for adults born between 1945 and 1965  More frequently in patients at high risk for Hepatitis C Hep C Antibody: 04/29/2019    Screening Current   Diabetes Screening 1-2 times per year if you're at risk for diabetes or have pre-diabetes Fasting glucose: 121 mg/dL (4/28/2022)  A1C: No results in last 5 years (No results in last 5 years)  Screening Current   Cholesterol Screening Once every 5 years if you don't have a lipid disorder  May order more often based on risk factors  Lipid panel: 04/28/2022  Screening Current      Other Preventive Screenings Covered by Medicare:  1  Abdominal Aortic Aneurysm (AAA) Screening: covered once if your at risk  You're considered to be at risk if you have a family history of AAA or a male between the age of 73-68 who smoking at least 100 cigarettes in your lifetime  2  Lung Cancer Screening: covers low dose CT scan once per year if you meet all of the following conditions: (1) Age 50-69; (2) No signs or symptoms of lung cancer; (3) Current smoker or have quit smoking within the last 15 years; (4) You have a tobacco smoking history of at least 20 pack years (packs per day x number of years you smoked); (5) You get a written order from a healthcare provider  3  Glaucoma Screening: covered annually if you're considered high risk: (1) You have diabetes OR (2) Family history of glaucoma OR (3)  aged 48 and older OR (3)  American aged 72 and older  3  Osteoporosis Screening: covered every 2 years if you meet one of the following conditions: (1) Have a vertebral abnormality; (2) On glucocorticoid therapy for more than 3 months; (3) Have primary hyperparathyroidism; (4) On osteoporosis medications and need to assess response to drug therapy  5  HIV Screening: covered annually if you're between the age of 12-76  Also covered annually if you are younger than 13 and older than 72 with risk factors for HIV infection  For pregnant patients, it is covered up to 3 times per pregnancy      Immunizations:  Immunization Recommendations   Influenza Vaccine Annual influenza vaccination during flu season is recommended for all persons aged >= 6 months who do not have contraindications   Pneumococcal Vaccine   * Pneumococcal conjugate vaccine = PCV13 (Prevnar 13), PCV15 (Vaxneuvance), PCV20 (Prevnar 20)  * Pneumococcal polysaccharide vaccine = PPSV23 (Pneumovax) Adults 2364 years old: 1-3 doses may be recommended based on certain risk factors  Adults 72 years old: 1-2 doses may be recommended based off what pneumonia vaccine you previously received   Hepatitis B Vaccine 3 dose series if at intermediate or high risk (ex: diabetes, end stage renal disease, liver disease)   Tetanus (Td) Vaccine - COST NOT COVERED BY MEDICARE PART B Following completion of primary series, a booster dose should be given every 10 years to maintain immunity against tetanus  Td may also be given as tetanus wound prophylaxis  Tdap Vaccine - COST NOT COVERED BY MEDICARE PART B Recommended at least once for all adults  For pregnant patients, recommended with each pregnancy  Shingles Vaccine (Shingrix) - COST NOT COVERED BY MEDICARE PART B  2 shot series recommended in those aged 48 and above     Health Maintenance Due:      Topic Date Due   • Colorectal Cancer Screening  12/28/2026   • Hepatitis C Screening  Completed     Immunizations Due:      Topic Date Due   • COVID-19 Vaccine (3 - Booster for Pfizer series) 09/14/2021   • Influenza Vaccine (1) 09/01/2022     Advance Directives   What are advance directives? Advance directives are legal documents that state your wishes and plans for medical care  These plans are made ahead of time in case you lose your ability to make decisions for yourself  Advance directives can apply to any medical decision, such as the treatments you want, and if you want to donate organs  What are the types of advance directives? There are many types of advance directives, and each state has rules about how to use them  You may choose a combination of any of the following:  · Living will: This is a written record of the treatment you want  You can also choose which treatments you do not want, which to limit, and which to stop at a certain time   This includes surgery, medicine, IV fluid, and tube feedings  · Durable power of  for healthcare [facility]): This is a written record that states who you want to make healthcare choices for you when you are unable to make them for yourself  This person, called a proxy, is usually a family member or a friend  You may choose more than 1 proxy  · Do not resuscitate (DNR) order:  A DNR order is used in case your heart stops beating or you stop breathing  It is a request not to have certain forms of treatment, such as CPR  A DNR order may be included in other types of advance directives  · Medical directive: This covers the care that you want if you are in a coma, near death, or unable to make decisions for yourself  You can list the treatments you want for each condition  Treatment may include pain medicine, surgery, blood transfusions, dialysis, IV or tube feedings, and a ventilator (breathing machine)  · Values history: This document has questions about your views, beliefs, and how you feel and think about life  This information can help others choose the care that you would choose  Why are advance directives important? An advance directive helps you control your care  Although spoken wishes may be used, it is better to have your wishes written down  Spoken wishes can be misunderstood, or not followed  Treatments may be given even if you do not want them  An advance directive may make it easier for your family to make difficult choices about your care  Cigarette Smoking and Your Health   Risks to your health if you smoke:  Nicotine and other chemicals found in tobacco damage every cell in your body  Even if you are a light smoker, you have an increased risk for cancer, heart disease, and lung disease  If you are pregnant or have diabetes, smoking increases your risk for complications  Benefits to your health if you stop smoking:   · You decrease respiratory symptoms such as coughing, wheezing, and shortness of breath     · You reduce your risk for cancers of the lung, mouth, throat, kidney, bladder, pancreas, stomach, and cervix  If you already have cancer, you increase the benefits of chemotherapy  You also reduce your risk for cancer returning or a second cancer from developing  · You reduce your risk for heart disease, blood clots, heart attack, and stroke  · You reduce your risk for lung infections, and diseases such as pneumonia, asthma, chronic bronchitis, and emphysema  · Your circulation improves  More oxygen can be delivered to your body  If you have diabetes, you lower your risk for complications, such as kidney, artery, and eye diseases  You also lower your risk for nerve damage  Nerve damage can lead to amputations, poor vision, and blindness  · You improve your body's ability to heal and to fight infections  For more information and support to stop smoking:   · [employer]  Phone: 4- 035 - [phone]  Web Address: [employer]  Underweight  Underweight is defined as having a body mass index (BMI) of less than 18 5 kg/m2   Anorexia  is a loss of appetite, decreased food intake, or both  Your appetite naturally decreases as you get older  You also get full faster than you used to  This occurs because your body needs less energy  Other body changes can also lead to a decreased appetite  Even though some appetite loss is normal, you still need to get enough calories and nutrients to keep you healthy  You can start to lose too much weight if you do not eat as much food as your body needs  Unwanted weight loss can cause health problems, or worsen health problems you already have  You can also become dehydrated if you do not drink enough liquid  How to eat healthy and get enough nutrients:   · Choose healthy foods  Eat a variety of fruits, vegetables, whole grains, low-fat dairy foods, lean meats, and other protein foods  Limit foods high in fat, sugar, and salt  Limit or avoid alcohol as directed   Work with a dietitian to help you plan your meals if you need to follow a special diet  A dietitian can also teach you how to modify foods if you have trouble chewing or swallowing  · Snack on healthy foods between meals  if you only eat a small amount during meals  Snacks provide extra healthy nutrients and calories between meals  Examples include fruit, cheese, and whole grain crackers  · Drink liquids as directed  to avoid dehydration  Drink liquids between meals if they cause you to get full too quickly during meals  Ask how much liquid to drink each day and which liquids are best for you  · Use herbs, spices, and flavor enhancers to add flavor to foods  Avoid using herbs and spice blends that also contain sodium  Ask your healthcare provider or dietitian about flavor enhancers  Flavor enhancers with ham, natural mena, and roast beef flavors can also be sprinkled on food to add flavor  · Share meals with others as often as you can  Eating with others may help you to eat better during meal time  Ask family members, neighbors, or friends to join you for lunch  There are also senior centers where you can meet people, and share meals with them  · Ask family and friends for help  with shopping or preparing foods  Ask for a ride to the grocery store, if needed  Narcotic (Opioid) Safety    Use narcotics safely:  · Take prescribed narcotics exactly as directed  · Do not give narcotics to others or take narcotics that belong to someone else  · Do not mix narcotics without medicines or alcohol  · Do not drive or operate heavy machinery after you take the narcotic  · Monitor for side effects and notify your healthcare provider if you experienced side effects such as nausea, sleepiness, itching, or trouble thinking clearly  Manage constipation:    Constipation is the most common side effect of narcotic medicine  Constipation is when you have hard, dry bowel movements, or you go longer than usual between bowel movements   Tell your healthcare provider about all changes in your bowel movements while you are taking narcotics  He or she may recommend laxative medicine to help you have a bowel movement  He or she may also change the kind of narcotic you are taking, or change when you take it  The following are more ways you can prevent or relieve constipation:    · Drink liquids as directed  You may need to drink extra liquids to help soften and move your bowels  Ask how much liquid to drink each day and which liquids are best for you  · Eat high-fiber foods  This may help decrease constipation by adding bulk to your bowel movements  High-fiber foods include fruits, vegetables, whole-grain breads and cereals, and beans  Your healthcare provider or dietitian can help you create a high-fiber meal plan  Your provider may also recommend a fiber supplement if you cannot get enough fiber from food  · Exercise regularly  Regular physical activity can help stimulate your intestines  Walking is a good exercise to prevent or relieve constipation  Ask which exercises are best for you  · Schedule a time each day to have a bowel movement  This may help train your body to have regular bowel movements  Bend forward while you are on the toilet to help move the bowel movement out  Sit on the toilet for at least 10 minutes, even if you do not have a bowel movement  Store narcotics safely:   · Store narcotics where others cannot easily get them  Keep them in a locked cabinet or secure area  Do not  keep them in a purse or other bag you carry with you  A person may be looking for something else and find the narcotics  · Make sure narcotics are stored out of the reach of children  A child can easily overdose on narcotics  Narcotics may look like candy to a small child  The best way to dispose of narcotics: The laws vary by country and area   In the [place], the best way is to return the narcotics through a take-back program  This program is offered by the [place] ([facility])  The following are options for using the program:  · Take the narcotics to a [facility] collection site  The site is often a law enforcement center  Call your local law enforcement center for scheduled take-back days in your area  You will be given information on where to go if the collection site is in a different location  · Take the narcotics to an approved pharmacy or hospital   A pharmacy or hospital may be set up as a collection site  You will need to ask if it is a [facility] collection site if you were not directed there  A pharmacy or doctor's office may not be able to take back narcotics unless it is a [facility] site  · Use a mail-back system  This means you are given containers to put the narcotics into  You will then mail them in the containers  · Use a take-back drop box  This is a place to leave the narcotics at any time  People and animals will not be able to get into the box  Your local law enforcement agency can tell you where to find a drop box in your area  Other ways to manage pain:   · Ask your healthcare provider about non-narcotic medicines to control pain  Nonprescription medicines include NSAIDs (such as ibuprofen) and acetaminophen  Prescription medicines include muscle relaxers, antidepressants, and steroids  · Pain may be managed without any medicines  Some ways to relieve pain include massage, aromatherapy, or meditation  Physical or occupational therapy may also help  For more information:   · Drug Enforcement Administration  [address] [place] [address] [address]  Phone: 3- 636 - [phone]  Web Address: [facility]/drug_disposal/    · [address]  and Drug Administration  [facility] , [address] [place] [address]  Phone: 6- 899 - 830-2647  Web Address: http://deviantART/     © Copyright [employer] 2018 Information is for End User's use only and may not be sold, redistributed or otherwise used for commercial purposes   All illustrations and images included in CareNotes® are the copyrighted property of A D A M , Inc  or Phyllis Marx

## 2022-10-31 NOTE — PROGRESS NOTES
Assessment and Plan:     Problem List Items Addressed This Visit        Respiratory    Chronic obstructive pulmonary disease ([facility] )    Relevant Orders    CT chest wo contrast       Cardiovascular and Mediastinum    ASCVD (arteriosclerotic cardiovascular disease)    Relevant Orders    Lipid panel    Comprehensive metabolic panel    Iron Panel (Includes Ferritin, Iron Sat%, Iron, and TIBC)       Musculoskeletal and Integument    Lumbar spondylosis    Relevant Medications    oxyCODONE-acetaminophen (PERCOCET)  mg per tablet      Other Visit Diagnoses     NSTEMI (non-ST elevated myocardial infarction) ([place] [address] )    -  Primary    Relevant Orders    Lipid panel    Comprehensive metabolic panel    Iron Panel (Includes Ferritin, Iron Sat%, Iron, and TIBC)    Pulmonary nodule        Relevant Orders    CT chest wo contrast    Chronic pain syndrome        Relevant Medications    oxyCODONE-acetaminophen (PERCOCET)  mg per tablet    COPD exacerbation (HCC)        Relevant Medications    doxycycline hyclate (VIBRAMYCIN) 100 mg capsule           Preventive health issues were discussed with patient, and age appropriate screening tests were ordered as noted in patient's After Visit Summary  Personalized health advice and appropriate referrals for health education or preventive services given if needed, as noted in patient's After Visit Summary       History of Present Illness:     Patient presents for a Medicare Wellness Visit    HPI   Patient Care Team:  Italo Peguero MD as PCP - General  ALEC Linton (Pain Medicine)     Review of Systems:     Review of Systems     Problem List:     Patient Active Problem List   Diagnosis   • Encounter for hepatitis C screening test for low risk patient   • Pain   • Anxiety   • Benign essential hypertension   • Chronic obstructive pulmonary disease ([place] [address] )   • Screening for neurological condition   • Chronic low back pain   • Early satiety   • GERD (gastroesophageal reflux disease)   • Fatigue   • Headache   • Incisional hernia   • Insomnia   • Left knee pain   • Neck pain   • Osteoarthritis of both knees   • Other chest pain   • Other instability, right knee   • Pain in joint of left shoulder   • Pain of left hip   • Weight loss, non-intentional   • Lumbar spondylosis   • Bulging of lumbar intervertebral disc   • Low back pain   • Displacement of lumbar intervertebral disc without myelopathy   • Screening for AAA (abdominal aortic aneurysm)   • Medicare annual wellness visit, initial   • Lumbar radiculopathy   • Uncomplicated opioid dependence ([facility] )   • Abnormal CT scan of lung   • Upper respiratory tract infection   • ASCVD (arteriosclerotic cardiovascular disease)   • Controlled substance agreement signed   • Tobacco use   • Hyponatremia   • Moderate protein-calorie malnutrition (HCC)   • Sacroiliitis, not elsewhere classified [facility])      Past Medical and Surgical History:     Past Medical History:   Diagnosis Date   • Anxiety    • Chronic pain disorder     back   • COPD (chronic obstructive pulmonary disease) (HCC) na   • Disease of thyroid gland    • GERD (gastroesophageal reflux disease)    • Headache(784 0)    • Hyperlipidemia    • Hypertension    • Hyperthyroidism     last assessed: 10/28/16   • NSTEMI (non-ST elevated myocardial infarction) ([facility] ) 4/13/2020   • Pneumonia    • Visual impairment      Past Surgical History:   Procedure Laterality Date   • CARDIAC CATHETERIZATION  07/02/2020    EF 65% normal   • COLONOSCOPY     • EPIDURAL BLOCK INJECTION N/A 1/17/2019    Procedure: L5 S1 Lumbar Epidural Steroid Injection (77715);   Surgeon: Lauren Aguilar MD;  Location: [facility];  Service: Pain Management    • EPIDURAL BLOCK INJECTION Left 3/6/2019    Procedure: L4- L5 and L5-S1 transforaminal epidural steroid injection;  Surgeon: Lauren Aguilar MD;  Location: [facility];  Service: Pain Management    • EPIDURAL BLOCK INJECTION Left 8/3/2021    Procedure: S1 TRANSFORAMINAL EPIDURAL STEROID INJECTION;  Surgeon: Vernon Ge MD;  Location: [facility];  Service: Pain Management    • FL GUIDED NEEDLE PLAC BX/ASP/INJ  3/6/2019   • INGUINAL HERNIA REPAIR Bilateral    • AZ INJECTION,SACROILIAC JOINT Left 6/24/2021    Procedure: BLOCK / INJECTION SACROILIAC;  Surgeon: Vernon Ge MD;  [facility]: [facility];  Service: Pain Management    • RADIOFREQUENCY ABLATION Left 3/29/2022    Procedure: ABLATION RADIO FREQUENCY (RFA) - Left SIJ;  Surgeon: Vernon Ge MD;  [facility];  Service: Pain Management       Family History:     Family History   Problem Relation Age of Onset   • Heart disease Mother    • Heart disease Father         cardiac disorder   • Heart disease Maternal Grandmother         cardiac disorder   • Heart disease Maternal Grandfather         cardiac disorder   • Heart disease Paternal Grandmother         cardiac disorder   • Heart disease Paternal Grandfather         cardiac disorder      Social History:     Social History     Socioeconomic History   • Marital status: Single     Spouse name: None   • Number of children: None   • Years of education: None   • Highest education level: None   Occupational History   • None   Tobacco Use   • Smoking status: Current Every Day Smoker     Packs/day: 1 00     Years: 15 00     Pack years: 15 00     Types: Cigarettes   • Smokeless tobacco: Never Used   Vaping Use   • Vaping Use: Never used   Substance and Sexual Activity   • Alcohol use: No   • Drug use: No   • Sexual activity: Not Currently   Other Topics Concern   • None   Social History Narrative   • None     Social Determinants of Health     Financial Resource Strain: Not on file   Food Insecurity: Not on file   Transportation Needs: Not on file   Physical Activity: Not on file   Stress: Not on file   Social Connections: Not on file   Intimate Partner Violence: Not on file   Housing Stability: Not on file      Medications and Allergies:     Current Outpatient Medications   Medication Sig Dispense Refill   • albuterol (2 5 mg/3 mL) 0 083 % nebulizer solution Take 3 mL (2 5 mg total) by nebulization every 4 (four) hours as needed for wheezing or shortness of breath 300 mL 0   • albuterol (PROVENTIL HFA,VENTOLIN HFA) 90 mcg/act inhaler inhale 2 puffs every 4 hours if needed for wheezing 18 g 0   • ALPRAZolam (XANAX) 0 25 mg tablet take 1 tablet by mouth three times a day if needed for anxiety 90 tablet 5   • Aspirin Low Dose 81 MG EC tablet take 1 tablet by mouth once daily 90 tablet 0   • atorvastatin (LIPITOR) 80 mg tablet take 1 tablet by mouth every evening 90 tablet 0   • clopidogrel (PLAVIX) 75 mg tablet take 1 tablet by mouth once daily 90 tablet 0   • Diclofenac Sodium (VOLTAREN) 1 % Apply 4 g topically 4 (four) times a day 500 g 0   • doxycycline hyclate (VIBRAMYCIN) 100 mg capsule Take 1 capsule (100 mg total) by mouth every 12 (twelve) hours for 7 days 14 capsule 0   • DULoxetine (CYMBALTA) 60 mg delayed release capsule take 1 capsule by mouth once daily 90 capsule 3   • FeroSul 325 (65 Fe) MG tablet take 1 tablet every morning BEFORE  BREAKFAST 30 tablet 5   • fluticasone-salmeterol (Advair Diskus) 250-50 mcg/dose inhaler Inhale 1 puff 2 (two) times a day Rinse mouth after use  60 blister 1   • losartan (COZAAR) 25 mg tablet Take 1 tablet (25 mg total) by mouth daily 90 tablet 0   • metoprolol succinate (TOPROL-XL) 25 mg 24 hr tablet take 1 tablet by mouth once daily 90 tablet 0   • oxyCODONE-acetaminophen (PERCOCET)  mg per tablet Take 1 tablet by mouth every 6 (six) hours as needed for moderate pain Max Daily Amount: 4 tablets 120 tablet 0   • pantoprazole (PROTONIX) 40 mg tablet take 1 tablet by mouth once daily 90 tablet 0   • zolpidem (AMBIEN) 5 mg tablet take 1 tablet by mouth at bedtime if needed for sleep 30 tablet 5   • naloxone (NARCAN) 4 mg/0 1 mL nasal spray Administer 1 spray into a nostril   If no response after 2-3 minutes, give another dose in the other nostril using a new spray  (Patient not taking: Reported on 10/28/2022) 1 each 1     No current facility-administered medications for this visit  No Known Allergies   Immunizations:     Immunization History   Administered Date(s) Administered   • COVID-19 PFIZER VACCINE 0 3 ML IM 03/22/2021, 04/14/2021   • H1N1, All Formulations 01/08/2010   • INFLUENZA 11/03/2011, 01/01/2012, 01/01/2014, 08/26/2014, 08/01/2015, 08/15/2016, 09/23/2017, 09/09/2018, 08/31/2020, 09/13/2021   • Influenza Quadrivalent Preservative Free 3 years and older IM 09/23/2017   • Influenza, high dose seasonal 0 7 mL 08/31/2020   • Influenza, seasonal, injectable 08/26/2014   • Influenza, seasonal, injectable, preservative free 08/01/2015   • Pneumococcal Conjugate 13-Valent 08/01/2015, 08/31/2020   • Pneumococcal Conjugate PCV 7 01/01/2011   • Pneumococcal Polysaccharide PPV23 09/09/2018   • Tdap 08/26/2014   • Tuberculin Skin Test-PPD Intradermal 10/04/2013, 10/23/2014   • Zoster Vaccine Recombinant 06/17/2020, 08/21/2020      Health Maintenance:         Topic Date Due   • Colorectal Cancer Screening  12/28/2026   • Hepatitis C Screening  Completed         Topic Date Due   • COVID-19 Vaccine (3 - Booster for Pfizer series) 09/14/2021   • Influenza Vaccine (1) 09/01/2022      Medicare Screening Tests and Risk Assessments:     Andrey Call is here for his Subsequent Wellness visit  Health Risk Assessment:   Patient rates overall health as good  Patient feels that their physical health rating is same  Patient is satisfied with their life  Eyesight was rated as same  Hearing was rated as same  Patient feels that their emotional and mental health rating is same  Patients states they are never, rarely angry  Patient states they are sometimes unusually tired/fatigued  Pain experienced in the last 7 days has been a lot  Patient's pain rating has been 8/10  Patient states that he has experienced weight loss or gain in last 6 months       Fall Risk Screening: In the past year, patient has experienced: no history of falling in past year      Home Safety:  Patient does not have trouble with stairs inside or outside of their home  Patient has working smoke alarms and has working carbon monoxide detector  Home safety hazards include: none  Nutrition:   Current diet is Regular  Medications:   Patient is not currently taking any over-the-counter supplements  Patient is able to manage medications  Activities of Daily Living (ADLs)/Instrumental Activities of Daily Living (IADLs):   Walk and transfer into and out of bed and chair?: Yes  Dress and groom yourself?: Yes    Bathe or shower yourself?: Yes    Feed yourself? Yes  Do your laundry/housekeeping?: Yes  Manage your money, pay your bills and track your expenses?: Yes  Make your own meals?: Yes    Do your own shopping?: Yes    Previous Hospitalizations:   Any hospitalizations or ED visits within the last 12 months?: No      PREVENTIVE SCREENINGS      Cardiovascular Screening:    General: Screening Current      Diabetes Screening:     General: Screening Current      Colorectal Cancer Screening:     General: Screening Current      Abdominal Aortic Aneurysm (AAA) Screening:    Risk factors include: age between 73-67 yo and tobacco use        Lung Cancer Screening:     General: Screening Not Indicated      Hepatitis C Screening:    General: Screening Current    Screening, Brief Intervention, and Referral to Treatment (SBIRT)    Screening  Typical number of drinks in a day: 0  Typical number of drinks in a week: 0  Interpretation: Low risk drinking behavior      AUDIT-C Screenin) How often did you have a drink containing alcohol in the past year? never  2) How many drinks did you have on a typical day when you were drinking in the past year? 0  3) How often did you have 6 or more drinks on one occasion in the past year? never    AUDIT-C Score: 0  Interpretation: Score 0-3 (male): Negative screen for alcohol misuse    Single Item Drug Screening:  How often have you used an illegal drug (including marijuana) or a prescription medication for non-medical reasons in the past year? never    Single Item Drug Screen Score: 0  Interpretation: Negative screen for possible drug use disorder    Review of Current Opioid Use    Opioid Risk Tool (ORT) Interpretation: Complete Opioid Risk Tool (ORT)    No exam data present     Physical Exam:     /80 (BP Location: Right arm, Patient Position: Sitting, Cuff Size: Standard)   Pulse 101   Temp (!) 97 °F (36 1 °C) (Temporal)   Resp 18   Ht 6' (1 829 m)   Wt 56 2 kg (124 lb)   SpO2 98%   BMI 16 82 kg/m²     Physical Exam     Zena Dutta MD

## 2022-11-04 ENCOUNTER — OFFICE VISIT (OUTPATIENT)
Dept: FAMILY MEDICINE CLINIC | Facility: CLINIC | Age: 67
End: 2022-11-04

## 2022-11-04 ENCOUNTER — TELEPHONE (OUTPATIENT)
Dept: FAMILY MEDICINE CLINIC | Facility: CLINIC | Age: 67
End: 2022-11-04

## 2022-11-04 VITALS — TEMPERATURE: 99.9 F | DIASTOLIC BLOOD PRESSURE: 78 MMHG | HEART RATE: 82 BPM | SYSTOLIC BLOOD PRESSURE: 125 MMHG

## 2022-11-04 DIAGNOSIS — Z13.1 SCREENING FOR DIABETES MELLITUS: ICD-10-CM

## 2022-11-04 DIAGNOSIS — N30.90 CYSTITIS: ICD-10-CM

## 2022-11-04 DIAGNOSIS — R35.0 URINARY FREQUENCY: Primary | ICD-10-CM

## 2022-11-04 LAB
BACTERIA UR QL AUTO: ABNORMAL /HPF
BILIRUB UR QL STRIP: NEGATIVE
CLARITY UR: CLEAR
COLOR UR: ABNORMAL
GLUCOSE UR STRIP-MCNC: NEGATIVE MG/DL
HGB UR QL STRIP.AUTO: NEGATIVE
KETONES UR STRIP-MCNC: NEGATIVE MG/DL
LEUKOCYTE ESTERASE UR QL STRIP: NEGATIVE
NITRITE UR QL STRIP: NEGATIVE
NON-SQ EPI CELLS URNS QL MICRO: ABNORMAL /HPF
PH UR STRIP.AUTO: 6.5 [PH]
PROT UR STRIP-MCNC: NEGATIVE MG/DL
RBC #/AREA URNS AUTO: ABNORMAL /HPF
SL AMB POCT HEMOGLOBIN AIC: 5.4 (ref ?–6.5)
SP GR UR STRIP.AUTO: 1.01 (ref 1–1.03)
UROBILINOGEN UR STRIP-ACNC: <2 MG/DL
WBC #/AREA URNS AUTO: ABNORMAL /HPF

## 2022-11-04 RX ORDER — CEPHALEXIN 500 MG/1
500 CAPSULE ORAL EVERY 6 HOURS SCHEDULED
Qty: 20 CAPSULE | Refills: 0 | Status: SHIPPED | OUTPATIENT
Start: 2022-11-04 | End: 2022-11-09

## 2022-11-04 NOTE — PROGRESS NOTES
Name: Flaco Thomas      : 1955      MRN: [id number]  Encounter Provider: Mary Fields DO  Encounter Date: 2022   Encounter department: [address] [place] [address]     1  Urinary frequency  POCT hemoglobin A1c    Urinalysis with microscopic    Urine culture   2  Screening for diabetes mellitus  POCT hemoglobin A1c    Urinalysis with microscopic    Urine culture   3  Cystitis  cephalexin (KEFLEX) 500 mg capsule       Patient has symptoms c/w UTI  We will start keflex but certainly high risk being male so will send for u/a and culture  We will tailor tx, pending findings  He is to stay well hydrated in the meantime  He is a long time smoker  A1C checked and wnl  ED for acute changes, temp up a bit  No Flank pain  RTC as scheduled or sooner prn    Patient/Caretaker verbalized understanding and were in agreement with today's assessment and plan  Time was taken to address any questions patient/caretaker had  Indication/Risks/Benefits of medication(s) as prescribed were discussed with the patient/caretaker  The patient verbalized understanding and agreement and elects to take medications as prescribed  Time was taken to answer any questions the patient/caretaker may have had  Chief Complaint   Patient presents with   • Urinary Frequency     Yesterday it once in 1 hour now it's twice an hours       Subjective      Since yesterday, urinary frequency  Was going once an hour yesterday and today, twice an hour  He is tired but no f/c/s  No raul burning or notable blood to share  Just finished Doxy for what sounds like a COPD exacerbation -- pt is a long time smoker  He cont to wheeze  No other c/o's today  Review of Systems   All other systems reviewed and are negative        Current Outpatient Medications on File Prior to Visit   Medication Sig   • albuterol (2 5 mg/3 mL) 0 083 % nebulizer solution Take 3 mL (2 5 mg total) by nebulization every 4 (four) hours as needed for wheezing or shortness of breath   • albuterol (PROVENTIL HFA,VENTOLIN HFA) 90 mcg/act inhaler inhale 2 puffs every 4 hours if needed for wheezing   • ALPRAZolam (XANAX) 0 25 mg tablet take 1 tablet by mouth three times a day if needed for anxiety   • Aspirin Low Dose 81 MG EC tablet take 1 tablet by mouth once daily   • atorvastatin (LIPITOR) 80 mg tablet take 1 tablet by mouth every evening   • clopidogrel (PLAVIX) 75 mg tablet take 1 tablet by mouth once daily   • Diclofenac Sodium (VOLTAREN) 1 % Apply 4 g topically 4 (four) times a day   • doxycycline hyclate (VIBRAMYCIN) 100 mg capsule Take 1 capsule (100 mg total) by mouth every 12 (twelve) hours for 7 days   • DULoxetine (CYMBALTA) 60 mg delayed release capsule take 1 capsule by mouth once daily   • FeroSul 325 (65 Fe) MG tablet take 1 tablet every morning BEFORE  BREAKFAST   • fluticasone-salmeterol (Advair Diskus) 250-50 mcg/dose inhaler Inhale 1 puff 2 (two) times a day Rinse mouth after use  • losartan (COZAAR) 25 mg tablet take 1 tablet by mouth once daily   • metoprolol succinate (TOPROL-XL) 25 mg 24 hr tablet take 1 tablet by mouth once daily   • oxyCODONE-acetaminophen (PERCOCET)  mg per tablet Take 1 tablet by mouth every 6 (six) hours as needed for moderate pain Max Daily Amount: 4 tablets   • pantoprazole (PROTONIX) 40 mg tablet take 1 tablet by mouth once daily   • zolpidem (AMBIEN) 5 mg tablet take 1 tablet by mouth at bedtime if needed for sleep   • naloxone (NARCAN) 4 mg/0 1 mL nasal spray Administer 1 spray into a nostril  If no response after 2-3 minutes, give another dose in the other nostril using a new spray  (Patient not taking: No sig reported)       Objective     /78 (BP Location: Left arm, Patient Position: Sitting, Cuff Size: Standard)   Pulse 82   Temp 99 9 °F (37 7 °C) (Temporal)     Physical Exam  Vitals and nursing note reviewed     Constitutional:       Appearance: Normal appearance  Comments: Audible wheezing, looks uncomfortable but in NAD   Frail    HENT:      Head: Normocephalic and atraumatic  Eyes:      Conjunctiva/sclera: Conjunctivae normal       Pupils: Pupils are equal, round, and reactive to light  Cardiovascular:      Rate and Rhythm: Normal rate and regular rhythm  Pulmonary:      Comments: Diffuse wheezing and rhonci on exam  Able to talk in full sentences  Abdominal:      General: Bowel sounds are normal       Palpations: Abdomen is soft  Comments: (-) Jamesyds    Musculoskeletal:      Cervical back: Neck supple  Lymphadenopathy:      Cervical: No cervical adenopathy  Skin:     General: Skin is warm and dry  Neurological:      General: No focal deficit present  Mental Status: He is alert and oriented to person, place, and time  Psychiatric:         Mood and Affect: Mood normal          Behavior: Behavior normal          Thought Content:  Thought content normal          Judgment: Judgment normal        Felipa [place], DO

## 2022-11-05 DIAGNOSIS — I10 BENIGN ESSENTIAL HYPERTENSION: ICD-10-CM

## 2022-11-05 LAB — BACTERIA UR CULT: NORMAL

## 2022-11-06 RX ORDER — FERROUS SULFATE 325(65) MG
TABLET ORAL
Qty: 30 TABLET | Refills: 5 | Status: SHIPPED | OUTPATIENT
Start: 2022-11-06

## 2022-11-17 DIAGNOSIS — J43.9 PULMONARY EMPHYSEMA, UNSPECIFIED EMPHYSEMA TYPE (HCC): ICD-10-CM

## 2022-11-18 RX ORDER — FLUTICASONE PROPIONATE AND SALMETEROL 250; 50 UG/1; UG/1
1 POWDER RESPIRATORY (INHALATION) 2 TIMES DAILY
Qty: 60 BLISTER | Refills: 3 | Status: SHIPPED | OUTPATIENT
Start: 2022-11-18

## 2022-11-20 NOTE — ADDENDUM NOTE
Addended by: Mehdi Long on: 7/13/2018 10:59 AM     Modules accepted: Giuliano
Addended by: Mei Conde on: 7/13/2018 11:16 AM     Modules accepted: Orders
no

## 2022-11-26 DIAGNOSIS — J44.9 CHRONIC OBSTRUCTIVE PULMONARY DISEASE, UNSPECIFIED COPD TYPE (HCC): ICD-10-CM

## 2022-11-28 RX ORDER — ALBUTEROL SULFATE 90 UG/1
AEROSOL, METERED RESPIRATORY (INHALATION)
Qty: 18 G | Refills: 0 | Status: SHIPPED | OUTPATIENT
Start: 2022-11-28

## 2022-12-01 DIAGNOSIS — I10 BENIGN ESSENTIAL HYPERTENSION: ICD-10-CM

## 2022-12-01 DIAGNOSIS — G89.4 CHRONIC PAIN SYNDROME: ICD-10-CM

## 2022-12-01 DIAGNOSIS — M47.816 LUMBAR SPONDYLOSIS: ICD-10-CM

## 2022-12-02 RX ORDER — FERROUS SULFATE 325(65) MG
1 TABLET ORAL
Qty: 30 TABLET | Refills: 0 | Status: SHIPPED | OUTPATIENT
Start: 2022-12-02

## 2022-12-02 RX ORDER — OXYCODONE AND ACETAMINOPHEN 10; 325 MG/1; MG/1
1 TABLET ORAL EVERY 6 HOURS PRN
Qty: 120 TABLET | Refills: 0 | Status: SHIPPED | OUTPATIENT
Start: 2022-12-02

## 2022-12-09 ENCOUNTER — TELEPHONE (OUTPATIENT)
Dept: FAMILY MEDICINE CLINIC | Facility: CLINIC | Age: 67
End: 2022-12-09

## 2022-12-09 DIAGNOSIS — J44.1 CHRONIC OBSTRUCTIVE PULMONARY DISEASE WITH ACUTE EXACERBATION (HCC): Primary | ICD-10-CM

## 2022-12-09 RX ORDER — DOXYCYCLINE 100 MG/1
100 CAPSULE ORAL 2 TIMES DAILY
Qty: 14 CAPSULE | Refills: 0 | Status: SHIPPED | OUTPATIENT
Start: 2022-12-09 | End: 2022-12-16

## 2022-12-09 NOTE — TELEPHONE ENCOUNTER
Received vm from pt    For the last week or so, I've been bringing up a Milky white saliva or mucus  I looked at the on the chart and you could get a doctor   Dobesh could send in a prescription card to clear it up

## 2022-12-13 ENCOUNTER — TELEPHONE (OUTPATIENT)
Dept: FAMILY MEDICINE CLINIC | Facility: CLINIC | Age: 67
End: 2022-12-13

## 2022-12-13 NOTE — TELEPHONE ENCOUNTER
Received vm from pt asking if he can increase his percocet   States he is taking the  ever 6 hours and laying on his back and only getting like 4 hours of sleep

## 2022-12-26 DIAGNOSIS — R52 PAIN: ICD-10-CM

## 2022-12-27 RX ORDER — PANTOPRAZOLE SODIUM 40 MG/1
TABLET, DELAYED RELEASE ORAL
Qty: 90 TABLET | Refills: 0 | Status: SHIPPED | OUTPATIENT
Start: 2022-12-27

## 2022-12-29 DIAGNOSIS — G89.4 CHRONIC PAIN SYNDROME: ICD-10-CM

## 2022-12-29 DIAGNOSIS — M47.816 LUMBAR SPONDYLOSIS: ICD-10-CM

## 2022-12-29 RX ORDER — OXYCODONE AND ACETAMINOPHEN 10; 325 MG/1; MG/1
1 TABLET ORAL EVERY 6 HOURS PRN
Qty: 120 TABLET | Refills: 0 | Status: SHIPPED | OUTPATIENT
Start: 2022-12-29

## 2022-12-31 DIAGNOSIS — J43.9 PULMONARY EMPHYSEMA, UNSPECIFIED EMPHYSEMA TYPE (HCC): ICD-10-CM

## 2022-12-31 DIAGNOSIS — J44.9 CHRONIC OBSTRUCTIVE PULMONARY DISEASE, UNSPECIFIED COPD TYPE (HCC): ICD-10-CM

## 2023-01-01 DIAGNOSIS — I21.4 NSTEMI (NON-ST ELEVATED MYOCARDIAL INFARCTION) (HCC): ICD-10-CM

## 2023-01-03 RX ORDER — FLUTICASONE PROPIONATE AND SALMETEROL 250; 50 UG/1; UG/1
1 POWDER RESPIRATORY (INHALATION) 2 TIMES DAILY
Qty: 60 BLISTER | Refills: 0 | Status: SHIPPED | OUTPATIENT
Start: 2023-01-03

## 2023-01-03 RX ORDER — ALBUTEROL SULFATE 90 UG/1
AEROSOL, METERED RESPIRATORY (INHALATION)
Qty: 18 G | Refills: 0 | Status: SHIPPED | OUTPATIENT
Start: 2023-01-03

## 2023-01-03 RX ORDER — ATORVASTATIN CALCIUM 80 MG/1
TABLET, FILM COATED ORAL
Qty: 90 TABLET | Refills: 0 | Status: SHIPPED | OUTPATIENT
Start: 2023-01-03

## 2023-01-03 RX ORDER — ALBUTEROL SULFATE 90 UG/1
2 AEROSOL, METERED RESPIRATORY (INHALATION) EVERY 4 HOURS PRN
Qty: 18 G | Refills: 5 | Status: SHIPPED | OUTPATIENT
Start: 2023-01-03

## 2023-01-11 DIAGNOSIS — G47.00 INSOMNIA, UNSPECIFIED TYPE: ICD-10-CM

## 2023-01-11 DIAGNOSIS — R52 PAIN: ICD-10-CM

## 2023-01-11 RX ORDER — DULOXETIN HYDROCHLORIDE 60 MG/1
CAPSULE, DELAYED RELEASE ORAL
Qty: 90 CAPSULE | Refills: 3 | Status: SHIPPED | OUTPATIENT
Start: 2023-01-11

## 2023-01-11 RX ORDER — ASPIRIN 81 MG/1
TABLET, COATED ORAL
Qty: 90 TABLET | Refills: 0 | Status: SHIPPED | OUTPATIENT
Start: 2023-01-11

## 2023-01-23 ENCOUNTER — RA CDI HCC (OUTPATIENT)
Dept: OTHER | Facility: HOSPITAL | Age: 68
End: 2023-01-23

## 2023-01-23 NOTE — PROGRESS NOTES
[facility]  coding opportunities       Chart reviewed, no opportunity found:   [address]        Patients Insurance     Medicare Insurance: [facility]

## 2023-01-26 DIAGNOSIS — I25.10 ASCVD (ARTERIOSCLEROTIC CARDIOVASCULAR DISEASE): ICD-10-CM

## 2023-01-26 RX ORDER — LOSARTAN POTASSIUM 25 MG/1
TABLET ORAL
Qty: 90 TABLET | Refills: 0 | Status: SHIPPED | OUTPATIENT
Start: 2023-01-26

## 2023-01-30 DIAGNOSIS — M47.816 LUMBAR SPONDYLOSIS: ICD-10-CM

## 2023-01-30 DIAGNOSIS — G89.4 CHRONIC PAIN SYNDROME: ICD-10-CM

## 2023-01-30 RX ORDER — OXYCODONE AND ACETAMINOPHEN 10; 325 MG/1; MG/1
1 TABLET ORAL EVERY 6 HOURS PRN
Qty: 120 TABLET | Refills: 0 | Status: SHIPPED | OUTPATIENT
Start: 2023-01-30

## 2023-02-20 ENCOUNTER — TRANSITIONAL CARE MANAGEMENT (OUTPATIENT)
Dept: FAMILY MEDICINE CLINIC | Facility: CLINIC | Age: 68
End: 2023-02-20

## 2023-03-04 DIAGNOSIS — G89.4 CHRONIC PAIN SYNDROME: ICD-10-CM

## 2023-03-04 DIAGNOSIS — M47.816 LUMBAR SPONDYLOSIS: ICD-10-CM

## 2023-03-04 NOTE — TELEPHONE ENCOUNTER
Medication Refill Request     Name oxyCODONE-acetaminophen (PERCOCET)  mg per tablet  Dose/Frequency Take 1 tablet by mouth every 6 (six) hours as needed for moderate pain Max Daily Amount: 4 tablets  Quantity 120  Verified pharmacy   [x]  Verified ordering Provider   [x]  Does patient have enough for the next 3 days?  Yes [] No [x]

## 2023-03-06 RX ORDER — OXYCODONE AND ACETAMINOPHEN 10; 325 MG/1; MG/1
1 TABLET ORAL EVERY 6 HOURS PRN
Qty: 120 TABLET | Refills: 0 | Status: SHIPPED | OUTPATIENT
Start: 2023-03-06

## 2023-03-27 ENCOUNTER — TRANSITIONAL CARE MANAGEMENT (OUTPATIENT)
Dept: FAMILY MEDICINE CLINIC | Facility: CLINIC | Age: 68
End: 2023-03-27

## 2023-03-27 ENCOUNTER — TELEPHONE (OUTPATIENT)
Dept: FAMILY MEDICINE CLINIC | Facility: CLINIC | Age: 68
End: 2023-03-27

## 2023-03-27 NOTE — TELEPHONE ENCOUNTER
Received vm from pt    Yeah, this is [employer]  I'm a patient of doctor Melissa Kessler  I take percocets for my pain, but it does not relieve the pain in my tailbone  I was wondering if there's something a little stronger  I need the pain in my tailbone and sciatica nerve  Please call back as soon as possible  My number is [phone] [phone]

## 2023-03-28 ENCOUNTER — OFFICE VISIT (OUTPATIENT)
Dept: FAMILY MEDICINE CLINIC | Facility: CLINIC | Age: 68
End: 2023-03-28

## 2023-03-28 VITALS
HEART RATE: 81 BPM | SYSTOLIC BLOOD PRESSURE: 140 MMHG | BODY MASS INDEX: 19.64 KG/M2 | TEMPERATURE: 98.8 F | RESPIRATION RATE: 18 BRPM | OXYGEN SATURATION: 98 % | WEIGHT: 144.8 LBS | DIASTOLIC BLOOD PRESSURE: 99 MMHG

## 2023-03-28 DIAGNOSIS — F11.20 CONTINUOUS OPIOID DEPENDENCE (HCC): ICD-10-CM

## 2023-03-28 DIAGNOSIS — Z87.891 PERSONAL HISTORY OF NICOTINE DEPENDENCE: ICD-10-CM

## 2023-03-28 DIAGNOSIS — Z09 HOSPITAL DISCHARGE FOLLOW-UP: Primary | ICD-10-CM

## 2023-03-28 DIAGNOSIS — I50.31 ACUTE DIASTOLIC CONGESTIVE HEART FAILURE (HCC): ICD-10-CM

## 2023-03-28 DIAGNOSIS — J44.9 CHRONIC OBSTRUCTIVE PULMONARY DISEASE, UNSPECIFIED COPD TYPE (HCC): ICD-10-CM

## 2023-03-28 DIAGNOSIS — E83.51 HYPOCALCEMIA: ICD-10-CM

## 2023-03-28 DIAGNOSIS — I10 BENIGN ESSENTIAL HYPERTENSION: ICD-10-CM

## 2023-03-28 DIAGNOSIS — Z71.89 COMPLEX CARE COORDINATION: ICD-10-CM

## 2023-03-28 DIAGNOSIS — A31.0 MAI (MYCOBACTERIUM AVIUM-INTRACELLULARE) (HCC): ICD-10-CM

## 2023-03-28 DIAGNOSIS — G89.4 CHRONIC PAIN SYNDROME: ICD-10-CM

## 2023-03-28 DIAGNOSIS — M47.816 LUMBAR SPONDYLOSIS: ICD-10-CM

## 2023-03-28 RX ORDER — FUROSEMIDE 40 MG/1
40 TABLET ORAL DAILY
Qty: 30 TABLET | Refills: 0
Start: 2023-03-28 | End: 2023-03-29 | Stop reason: SDUPTHER

## 2023-03-28 RX ORDER — FLUTICASONE FUROATE, UMECLIDINIUM BROMIDE AND VILANTEROL TRIFENATATE 100; 62.5; 25 UG/1; UG/1; UG/1
1 POWDER RESPIRATORY (INHALATION) DAILY
COMMUNITY

## 2023-03-28 RX ORDER — LOSARTAN POTASSIUM 50 MG/1
50 TABLET ORAL DAILY
Qty: 30 TABLET | Refills: 0
Start: 2023-03-28 | End: 2023-03-29 | Stop reason: SDUPTHER

## 2023-03-28 RX ORDER — ZINC SULFATE 50(220)MG
CAPSULE ORAL
COMMUNITY
Start: 2023-03-24

## 2023-03-28 RX ORDER — ERGOCALCIFEROL 1.25 MG/1
125 CAPSULE ORAL WEEKLY
COMMUNITY

## 2023-03-28 RX ORDER — AZITHROMYCIN 500 MG/1
500 TABLET, FILM COATED ORAL DAILY
COMMUNITY
Start: 2023-03-27

## 2023-03-28 RX ORDER — LIDOCAINE 4 G/G
1 PATCH TOPICAL DAILY
COMMUNITY
Start: 2023-01-28

## 2023-03-28 RX ORDER — ETHAMBUTOL HYDROCHLORIDE 100 MG/1
1000 TABLET, FILM COATED ORAL DAILY
COMMUNITY

## 2023-03-28 RX ORDER — UREA 10 %
LOTION (ML) TOPICAL
COMMUNITY
End: 2023-03-28 | Stop reason: ALTCHOICE

## 2023-03-28 RX ORDER — MULTIVIT WITH MINERALS/LUTEIN
500 TABLET ORAL DAILY
COMMUNITY

## 2023-03-28 RX ORDER — MULTIVITAMIN WITH FOLIC ACID 400 MCG
1 TABLET ORAL DAILY
COMMUNITY
Start: 2023-03-24

## 2023-03-28 RX ORDER — RIFAMPIN 300 MG/1
600 CAPSULE ORAL DAILY
COMMUNITY

## 2023-03-28 RX ORDER — COLLAGENASE SANTYL 250 [ARB'U]/G
OINTMENT TOPICAL DAILY
COMMUNITY

## 2023-03-28 RX ORDER — GABAPENTIN 300 MG/1
300 CAPSULE ORAL 2 TIMES DAILY
COMMUNITY
Start: 2023-03-24

## 2023-03-28 RX ORDER — LIDOCAINE 4 G/G
1 PATCH TOPICAL DAILY
COMMUNITY
End: 2023-03-28 | Stop reason: ALTCHOICE

## 2023-03-28 NOTE — Clinical Note
Caity Mina - Cc'ing you here as complicated case and I tried my best here today to help him  Alfredo Aguilar - this is an example of why we should not see follow ups from skilled nursing facilities until ALL records from their facility are received  I had nothing today, he has no idea what he is doing and I cannot find much in the system  Can I suggest that we request [facility] a medication reconciliation by the time we see them from these SNFs?    TY    Chiara Barrett, DO

## 2023-03-28 NOTE — PROGRESS NOTES
"Assessment & Plan     1  Hospital discharge follow-up  - I have his hospital d/c from 3/3  I have nothing else  I can see per Care Everywhere an ID visit from 3/13 - he has no recollection of this  He \"thinks\" he is on antibiotics but is unsure  Unsure of lab work  Unsure what services are being offered by [facility], knows they are coming  I have no information or updated med list from [facility] at this visit  Per ID note as well, he should also be having labs to monitor the meds he is on (see HPI below), he knows nothing about this? I have asked SW to help us  I have requested to have these - ideally should have them by the time I am seeing patient to optimize his care  He is overwhelmed and this is reasonable  He lives with his daughter, she works, is not here  A friend is here with him, not in the room  I do not see that he is followed by Pulmonary Medicine  I am not sure how this is possible but on his [facility] d/c I do not see a f/u appt for them and only see ID on board  Will ask Pulmonary to get on board as well  This is a quite complicated case complicated by low level literacy and lack of social support  2  Lumbar spondylosis  On pain meds  Cont to have debilitating pain  He is asking if these need to be increased  In lieu of his pulmonary status, not comfortable  Will see PCP in April to f/u on this  3  Chronic pain syndrome    4  Chronic obstructive pulmonary disease, unspecified COPD type ([address] )  Stable today  O2 sat stable  On RA  He cont to smoke and this is unfortunate  5  Benign essential hypertension  High today  Will monitor this closely  Per medication reconciliation, his meds on his d/c summary were adjusted  Unfortunately when I saw him, I do not have these  I have asked our staff to get his records and update his med list form d/c from [facility]  Will need to closely monitor this    He ultimately may need to see Cardiology but less " "pressing at the moment  - Basic metabolic panel; Future  - Calcium, ionized; Future    6  Continuous opioid dependence ([address] )    7  Personal history of nicotine dependence  Not ready to quit  Urged cessation  8  Hypocalcemia  Will repeat labs  Do not see these were done  - Basic metabolic panel; Future  - Calcium, ionized; Future    9  Acute diastolic congestive heart failure ([address] )  Notes/imaging reviewed from hospital stay  Is with edema today  D/c on lasix, tells me he is taking this  Not on current med list       10  Complex care coordination  - Ambulatory Referral to Social Work Care Management Program; Future    Return for 4/28 -- AMW visit/CDM with Dr Hang Kilgore   Patient/Caretaker verbalized understanding and were in agreement with today's assessment and plan  Time was taken to address any questions patient/caretaker had  Indication/Risks/Benefits of medication(s) as prescribed were discussed with the patient/caretaker  The patient verbalized understanding and agreement and elects to take medications as prescribed  Time was taken to answer any questions the patient/caretaker may have had  Chief Complaint   Patient presents with   • Transition of Care Management       Subjective     Transitional Care Management Review:   Cate Quiros is a 69 y o  male here for TCM follow up  Unfortunately, he is here with no records from [facility] and has poor insight to his healthcare/diagnosis', follow up, etc   Does have [facility], unsure what services are being rendered  Has not had any bw to report since his ID visit on 3/13, does not remember this visit  This was at [facility]   He is to have f/u, he does not know this  Feels he is taking antibiotics \"I don't know  \"  He is not with worsening cough or sob  No solano that is worse  He got d/c from [facility] on Friday 3/24/23  His oldest daughter lives with him  There is no sob - at his baseline    There is some " "edema to his LEs  These are about the same as when hospitalized  Is taking his \"water pill  \"      He cont to smoke - ~ 3/4 of ppd - [place] as [place]  It is hard for him to quit  Knows his risks  ID Doctor visit 3/13/23:   Jeanna Diane MD    [facility]   [address]    [place], [place] [address] [phone] Juwan Siddiqui     Assessment/Plan:     Complicated pneumonia  1/51 - 3/1-patient received 10 days of antibiotics completing with cefpodoxime on 3/5/2023    Clinically remains well     pulmonary ROJAS-cavitary disease  This problem is acute or chronic illness that poses a threat to life or bodily function  Ct scan worse and not clear is this is multifactorial - recent pneumonia, ROJAS with underlying copd   Some air fluid levels noted in bulla on ct     History of COPD-severe     Suggest   1 start ROJAS therapy with aziithro/ethambutol and rifampin with consideration of amikacin ( inhaled vs IV )   Long discussion regarding side effects of all meds including hearing loss/liver/color visual changes   Concern for tolerance of this pt with these difficult medications especially amikacin   He is already debilitated with weight loss   Not sure we will cure this pt and not sure that he would need or tolerate surgical resection   Will need to discuss with daughter and facility   Palliative treatment at best   2 baseline hearing and eye exam   3 f/u labs in 2 weeks than monthly when start     F/u in 4-6 weeks from start        [address]    Patient Name: Alejandro Mendez  Patient MRN: [id number]  Admitting Provider: Sukhi Roblero DO  Discharging Provider: Viry Ledbetter MD  Primary Care Physician at Discharge: Myles Aguero [phone]   Admission Date: 2/24/2023   Discharge Date: 3/3/2023    Admission Diagnoses   Hypokalemia [E87 6]  Hypomagnesemia [E83 42]  Pneumonia [J18 9]  Encephalopathy [G93 40]  Primary spontaneous pneumothorax [J93 11]  Chronic " obstructive pulmonary disease with acute exacerbation ([facility]) [J44 1]  Pneumonia of both lungs due to infectious organism, unspecified part of lung [J18 9]  Acute congestive heart failure, unspecified heart failure type ([place] [address] ) [I50 9]  Sepsis, due to unspecified organism, unspecified whether acute organ dysfunction present [facility]) [A41 9]    Discharge Diagnoses  Principal Problem:  Sepsis due to pneumonia [facility])  Active Problems:  COPD with acute exacerbation ([place] [address] )  Chronic low back pain  Hyponatremia  CAD (coronary artery disease)  Abnormal CT scan of lung  Anxiety  Essential hypertension  Displacement of lumbar intervertebral disc without myelopathy  GERD (gastroesophageal reflux disease)  Incisional hernia  Insomnia  Lumbar radiculopathy  COPD (chronic obstructive pulmonary disease) ([facility])  Closed fracture of multiple ribs  Multiple fractures  Moderate protein-energy malnutrition ([facility])  Cachexia ([facility])  Pulmonary Mycobacterium avium complex (MAC) infection ([facility])  Anemia of chronic disease  Right upper lobe pneumonia  Pneumothorax on right  Acute respiratory failure with hypoxia ([facility])  Pressure injury, unstageable, with suspected deep tissue injury ([place] [address] )  Dyslipidemia  History of myocardial infarction  Primary osteoarthritis involving multiple joints  Hyperkalemia  Acute diastolic congestive heart failure [facility])      Discharge Instructions and Follow-Ups  Discharge Instructions: Activity: activity as tolerated  Diet: Cardiac Diet  Recommended outpatient tests: Complete blood count with differential in in 1 week, Basic metabolic panel in 1 week, and vitam D in 8 weeks   Recommended follow ups: The patient should follow up with PCP within 1 week of discharge  No future appointments    Ismael Toledo MD  [address] [place] [address]  [phone]    Follow up in 3 day(s)  post hospital discharge    PCP at [facility]    Follow up  post hospital discharge    Medications    Medication List       START taking these medications     cefpodoxime 200 MG tablet  Commonly known as: VANTIN  Take 1 tablet (200 mg total) by mouth 2 (two) times a day for 5 doses  furosemide 40 MG tablet  Commonly known as: LASIX  Take 1 tablet (40 mg total) by mouth daily  oxyCODONE-acetaminophen 5-325 mg per tablet  Commonly known as: PERCOCET  Take 2 tablets by mouth every 6 (six) hours as needed for moderate pain (pain score 4-6) or severe pain (pain score 7-10)  Max Daily Amount: 8 tablets  Replaces: oxyCODONE-acetaminophen  mg per tablet    predniSONE 20 MG tablet  Commonly known as: DELTASONE  Take 2 tablets (40 mg total) by mouth daily for 1 day  Start taking on: March 4, 2023          CHANGE how you take these medications     ALPRAZolam 0 25 MG tablet  Commonly known as: XANAX  Take 1 tablet (0 25 mg total) by mouth 3 (three) times a day as needed for anxiety  What changed: when to take this    aspirin 81 MG EC tablet  Take 1 tablet (81 mg total) by mouth daily  What changed:   when to take this  reasons to take this    losartan 50 MG tablet  Commonly known as: COZAAR  Take 1 tablet (50 mg total) by mouth daily  What changed:   medication strength  how much to take          CONTINUE taking these medications     * albuterol 2 5 mg /3 mL (0 083 %) nebulizer solution  Commonly known as: PROVENTIL    * albuterol 90 mcg/actuation inhaler  Commonly known as: PROVENTIL HFA;VENTOLIN HFA;PROAIR HFA    atorvastatin 80 MG tablet  Commonly known as: LIPITOR    balsam peru-castor oiL Oint  Commonly known as: BPCO/Venelex  Apply topically 2 (two) times a day  clopidogreL 75 mg tablet  Commonly known as: PLAVIX    DULoxetine 60 MG capsule  Commonly known as: CYMBALTA    ergocalciferol 50,000 unit (1 25 mg) capsule  Commonly known as: DRISDOL (VITAMIN D-2)  Take 1 capsule (50,000 Units total) by mouth once a week for 5 doses      ferrous sulfate 325 mg tablet    gabapentin 300 MG capsule  Commonly known as: NEURONTIN  Take 1 capsule (300 mg total) by mouth 2 (two) times a day  lidocaine 4 % Ptmd  Commonly known as: ASPERCREME  Place 1 patch on the skin daily  metoprolol 25 MG 24 hr tablet  Commonly known as: TOPROL-XL  Take 0 5 tablets (12 5 mg total) by mouth daily  pantoprazole 40 MG tablet  Commonly known as: PROTONIX  Take 1 tablet (40 mg total) by mouth every morning  TRELEGY ELLIPTA 100-62 5-25 mcg/puff powder for inhalation  Generic drug: fluticasone-umeclidinium-vilanterol        * This list has 2 medication(s) that are the same as other medications prescribed for you  Read the directions carefully, and ask your doctor or other care provider to review them with you  STOP taking these medications     oxyCODONE-acetaminophen  mg per tablet  Commonly known as: PERCOCET  Replaced by: oxyCODONE-acetaminophen 5-325 mg per tablet            Where to Get Your Medications       These medications were sent to [facility], [address] [place] [id number]     Phone: [phone]   cefpodoxime 200 MG tablet      You can get these medications from any pharmacy   Bring a paper prescription for each of these medications  ALPRAZolam 0 25 MG tablet  oxyCODONE-acetaminophen 5-325 mg per tablet      Information about where to get these medications is not yet available   Ask your nurse or doctor about these medications  furosemide 40 MG tablet  losartan 50 MG tablet  predniSONE 20 MG tablet      Hospital Course  History source: Patient  This is a brief hx of the patient hospital course, review complete chart for complete details   Ashely Figueroa is 69 y o  male with past medical history as mentioned above  He presented to [facility] with complaint of sob  Patient states that he has been having increasing shortness of breath over the last week  Patient is difficult historian  He states his cough has not really changed   He denies copious amounts of sputum production  He denies fevers or chills states his appetite has been good  States he has been ambulating around his home with a walker  Denies any nausea vomiting diarrhea or urinary symptoms  States he continues to smoke  2/27/23:  Patient seen in room  No acute distress  Breathing much improved since yesterday  Continue empiric IV antibiotics  Infectious disease consult pending  Consulted IR for right diagnostic thoracentesis  Continue supportive care  Continue to monitor  Patient examined at bedsideHistory source: Patient  This is a brief hx of the patient hospital course, review complete chart for complete details   Rajat Sood is 69 y o  male with past medical history as mentioned above  He presented to [facility] with complaint of sob  Patient states that he has been having increasing shortness of breath over the last week  Patient is difficult historian  He states his cough has not really changed  He denies copious amounts of sputum production  He denies fevers or chills states his appetite has been good  States he has been ambulating around his home with a walker  Denies any nausea vomiting diarrhea or urinary symptoms  States he continues to smoke  2/27/23:  Patient seen in room  No acute distress  Breathing much improved since yesterday  Continue empiric IV antibiotics  Infectious disease consult pending  Consulted IR for right diagnostic thoracentesis  Continue supportive care  Continue to monitor  3/1/23  Examined at bedside , transitioned to oral antibiotics, continue current treatment plan of care, plan discharge 24 hours if remains clinically stable on oral antibiotics,   3/2/23  Patient examined at bedside today's date  Examined with attending  Patient transition to oral Lasix and oral steroids  Patient requesting to go home  Patient is medically stable for discharge to nursing facility today's date with all discharge instructions as ordered   Sputum culture remains pending on discharge   3/3/23  Patient examined today's day at bedside  Remains medically stable for discharge to skilled nursing facility  Pending transport per notes of case mngt   Consults   IP CONSULT TO NUTRITION SERVICES  IP CONSULT TO INFECTIOUS DISEASES  IP CONSULT TO [id number] [place] [address]  IP CONSULT TO [facility]    Labs, Procedures, and Studies   Important Lab findings:  Please see Hospital Course  Studies:  XR CHEST 1 VW PORTABLE    Result Date: 2/27/2023  IMPRESSION: No significant change in extensive right-sided pulmonary consolidation, mild/moderate consolidation of the left lung apex and moderate right pleural effusion  Workstation:[id number]    XR CHEST 1 VW PORTABLE    Result Date: 2/25/2023  Impression: Redemonstration of large area of consolidation in the right upper lung zone which appears denser and more compacted  Opacity in the left upper lung zone with a 2 3 cm nodular density more inferiorly, similar to recent CT chest, unchanged  Emphysematous changes of the lungs with bullous changes in the upper lobes  Redemonstration of a right hydropneumothorax, better seen on recent CT chest  The right pleural effusion has mildly increased in size  Hazy opacity in the right lower lung zone may be related to atelectasis versus infiltrate, appears slightly more conspicuous from recent radiograph  Findings are in keeping with multifocal pneumonia and right-sided hydropneumothorax  Nodular densities in the lungs may be related to nodular areas of infiltrate versus pulmonary lesions  Advise short-term follow-up after treatment  Workstation:[id number]    XR CHEST 1 VW PORTABLE    Result Date: 2/24/2023  IMPRESSION: Portable chest x-ray  See today's concurrent CT chest report  There is progressive pneumonia at the right upper lung  Moderate right pleural effusion  Moderate right pneumothorax which is not apparent on routine x-ray  Left upper lung 7 cm abnormality which appears to contain a spiculated lesion  Workstation:[id number]    CT CHEST WO CONTRAST    Result Date: 2/24/2023  IMPRESSION: CT chest  Marked pneumonia at the right upper lung superimposed upon severe emphysema  Also right pneumothorax  Also right pleural effusion, moderate  Contralateral left upper lung again shows the appearance of approximate 2 7 cm spiculated lesion with contiguous prominent bullae with regional soft tissue thickening; involving combined 8 cm region  Workstation:[id number]    THORACENTESIS DIAGNOSTIC RIGHT    Result Date: 2/28/2023  Impression: Ultrasound-guided right thoracentesis  Workstation:[id number]   Procedures:   Please see Hospital Course    Discharge Disposition  SNF    Code Status at Discharge  DNR/DNI    Review of Systems  Review of Systems   Constitutional: Positive for fatigue  HENT: Negative  Vision: Negative  Respiratory: negative for cough   Cardiovascular: Negative  Gastrointestinal: Negative  Genitourinary: Negative  Musculoskeletal: weakness   Skin: Negative  Neurological: Negative  Mental Health: Negative  All other systems reviewed and are negative  ROS documented by staff reviewed and updated  Physical Exam at Discharge  Condition of Patient on Discharge: stable  /79  Pulse 68  Temp 98 3 °F (36 8 °C)  Resp 16  Ht 1 829 m (6')  Wt 61 7 kg (136 lb)  SpO2 97%  BMI 18 44 kg/m²   O2 Device: Room air  Physical Exam  Vitals and nursing note reviewed  Constitutional:   General: He is awake  He is not in acute distress  Appearance: He is ill-appearing  Interventions: Nasal cannula in place  Comments: 69year old chronically ill appearing male   Appears underweight with BMI   HENT:   Head: Normocephalic and atraumatic  Eyes:   Extraocular Movements: EOM normal    Conjunctiva/sclera: Conjunctivae normal    Pupils: Pupils are equal, round, and reactive to light  Cardiovascular:   Rate and Rhythm: Normal rate and regular rhythm  Heart sounds: Normal heart sounds     Pulmonary: Effort: Pulmonary effort is normal    Breath sounds: Decreased breath sounds present  Abdominal:   General: Bowel sounds are normal  There is no distension  Palpations: Abdomen is soft  Tenderness: There is no abdominal tenderness  Musculoskeletal:   Cervical back: Normal range of motion and neck supple  Right lower leg: Edema present/improved   Left lower leg: Edema present/improved   Neurological:   Mental Status: He is alert and oriented to person, place, and time  Skin:  General: Skin is warm and dry  Capillary Refill: Capillary refill takes less than 2 seconds  Psychiatric:   Mood and Affect: Mood and affect normal    Behavior: Behavior is cooperative  Time  60 minutes exam, chart review, collaborative care, education of patient with treatment plan of care  50% time spent bedside patient care  Discharge summary, discharge instructions, discharge med reconciliation  Daughter was called on 3/2/23 with update and DC plan , message was left to return call with no return call to date   [address]  I have used the Epic copy/forward function to compose this note  I have reviewed my current note to ensure it reflects the current patient status, exam, assessment and plan  ALEC Stephenson      During the TCM phone call patient stated:  TCM Call     Date and time call was made  3/27/2023 11:26 AM    Hospital care reviewed  Records reviewed    Patient was hospitialized at  Other (comment)    Comment  [facility]    Date of Admission  --  unknown    Date of discharge  03/24/23    Diagnosis  CHF    Disposition  Home    Were the patients medications reviewed and updated  Yes    Current Symptoms  Weakness      TCM Call     Post hospital issues  Reduced activity    Should patient be enrolled in anticoag monitoring? No    Scheduled for follow up?   Yes    Patients specialists  Cardiologist    Did you obtain your prescribed medications  Yes    Do you need help managing your prescriptions or medications  No    Is transportation to your appointment needed  No    I have advised the patient to call PCP with any new or worsening symptoms  [address]  Family    Are you recieving any outpatient services  No    Are you recieving home care services  No    Are you using any community resources  No    Current waiver services  No    Have you fallen in the last 12 months  Yes    Interperter language line needed  No        HPI  Review of Systems   All other systems reviewed and are negative  Objective     /99 (BP Location: Left arm, Patient Position: Sitting, Cuff Size: Standard)   Pulse 81   Temp 98 8 °F (37 1 °C) (Tympanic)   Resp 18   Wt 65 7 kg (144 lb 12 8 oz)   SpO2 98%   BMI 19 64 kg/m²      Physical Exam  Vitals and nursing note reviewed  Constitutional:       Comments: Cachectic, looks older than stated age     HENT:      Head: Normocephalic and atraumatic  Mouth/Throat:      Mouth: Mucous membranes are moist    Eyes:      Conjunctiva/sclera: Conjunctivae normal    Cardiovascular:      Rate and Rhythm: Normal rate and regular rhythm  Comments: 1+ pitting edema to his LLE and 1+ to the R   Pulmonary:      Breath sounds: Wheezing and rhonchi present  No rales  Abdominal:      General: Bowel sounds are normal       Palpations: Abdomen is soft  Comments: Scaphoid     Musculoskeletal:      Cervical back: Neck supple  Comments: Frail  Using a walker  Slow, steady gait     Lymphadenopathy:      Cervical: No cervical adenopathy  Neurological:      General: No focal deficit present  Mental Status: He is alert and oriented to person, place, and time     Psychiatric:         Mood and Affect: Mood normal          Behavior: Behavior normal        Medications have been reviewed by provider in current encounter    [place]  [address], DO

## 2023-03-28 NOTE — PROGRESS NOTES
Brand name only    Phoned in, [facility] at [address] [employer], would not E-scribe. TCM Call     Date and time call was made  3/27/2023 11:26 AM    Hospital care reviewed  Records reviewed    Patient was hospitialized at  Other (comment)    Comment  [facility] [place]    Date of Admission  --  unknown    Date of discharge  03/24/23    Diagnosis  CHF    Disposition  Home    Were the patients medications reviewed and updated  Yes    Current Symptoms  Weakness      TCM Call     Post hospital issues  Reduced activity    Should patient be enrolled in anticoag monitoring? No    Scheduled for follow up?   Yes    Patients specialists  Cardiologist    Did you obtain your prescribed medications  Yes    Do you need help managing your prescriptions or medications  No    Is transportation to your appointment needed  No    I have advised the patient to call PCP with any new or worsening symptoms  [address]  Family    Are you recieving any outpatient services  No    Are you recieving home care services  No    Are you using any community resources  No    Current waiver services  No    Have you fallen in the last 12 months  Yes    Interperter language line needed  No

## 2023-03-28 NOTE — PATIENT INSTRUCTIONS
Floyd Lewis MD  - this is the infectious Disease Doctor - need to call for a f/u exam   [facility] [place] [facility]    [place], [address] [place] [address]     [phone] (Work)

## 2023-03-29 DIAGNOSIS — I50.31 ACUTE DIASTOLIC CONGESTIVE HEART FAILURE (HCC): ICD-10-CM

## 2023-03-30 ENCOUNTER — PATIENT OUTREACH (OUTPATIENT)
Dept: FAMILY MEDICINE CLINIC | Facility: CLINIC | Age: 68
End: 2023-03-30

## 2023-03-30 ENCOUNTER — TELEPHONE (OUTPATIENT)
Dept: CARDIOLOGY CLINIC | Facility: CLINIC | Age: 68
End: 2023-03-30

## 2023-03-30 RX ORDER — FUROSEMIDE 40 MG/1
40 TABLET ORAL DAILY
Qty: 30 TABLET | Refills: 0 | Status: SHIPPED | OUTPATIENT
Start: 2023-03-30

## 2023-03-30 RX ORDER — LOSARTAN POTASSIUM 50 MG/1
50 TABLET ORAL DAILY
Qty: 30 TABLET | Refills: 0 | Status: SHIPPED | OUTPATIENT
Start: 2023-03-30

## 2023-03-30 NOTE — PROGRESS NOTES
RUBEN MEJIA reviewed chart  Patient was referred by PCP d/t concerns that patient is unaware of f/u appointments and medications  RUBEN MEJIA outreached patient and introduced self and offered to discuss needs and offer assistance  Patient expressed understanding, but was not sure he wants or needs any help  RUBEN MEJIA inquired about need for transportation  Patient stated that his neighbor or his daughter, Chiara Ramos, take him places  Patient stated he does not want to rely on STS and does not want to apply for the program     Patient stated that Chiara Ramos sorts his medication for him  RUBEN MEJIA shared that there is concern that he is not sure what he takes  RUBEN MEJIA asked patient if she could call Chiara Ramos to ensure that his medication is being managed  Patient refused and stated he does not want RUBEN to call Chiara Ramos as she works and has a family  RUBEN MEJIA asked about C  Patient stated he has someone coming in this afternoon for OT  He does not know the agency he is working with  He said he has not seen them before and his daughter does not know what agency they are with  Patient declined assistance with this matter  RUBEN MEJIA inquired about other areas of need  Patient denied having anything RUBEN MEJIA can assist with at this time  RUBEN MEJIA encouraged patient to call back should he want any assistance and shared that he can also call PCP's office for another referral  IB sent to PCP to inform that patient refused assistance at this time

## 2023-04-03 DIAGNOSIS — M47.816 LUMBAR SPONDYLOSIS: ICD-10-CM

## 2023-04-03 DIAGNOSIS — G89.4 CHRONIC PAIN SYNDROME: ICD-10-CM

## 2023-04-03 RX ORDER — OXYCODONE AND ACETAMINOPHEN 10; 325 MG/1; MG/1
1 TABLET ORAL EVERY 6 HOURS PRN
Qty: 120 TABLET | Refills: 0 | Status: SHIPPED | OUTPATIENT
Start: 2023-04-03

## 2023-04-12 RX ORDER — COLLAGENASE SANTYL 250 [ARB'U]/G
OINTMENT TOPICAL DAILY
Qty: 15 G | Status: CANCELLED | OUTPATIENT
Start: 2023-04-12

## 2023-04-24 DIAGNOSIS — G89.4 CHRONIC PAIN SYNDROME: Primary | ICD-10-CM

## 2023-04-24 RX ORDER — BACILLUS COAGULANS 1B CELL
CAPSULE ORAL
COMMUNITY
Start: 2023-03-29 | End: 2023-04-25 | Stop reason: SDUPTHER

## 2023-04-24 RX ORDER — GABAPENTIN 300 MG/1
300 CAPSULE ORAL 2 TIMES DAILY
Qty: 180 CAPSULE | Refills: 3 | Status: SHIPPED | OUTPATIENT
Start: 2023-04-24

## 2023-04-25 DIAGNOSIS — G89.4 CHRONIC PAIN SYNDROME: Primary | ICD-10-CM

## 2023-04-25 RX ORDER — MULTIVIT WITH MINERALS/LUTEIN
500 TABLET ORAL DAILY
OUTPATIENT
Start: 2023-04-25

## 2023-04-25 RX ORDER — FLUTICASONE FUROATE, UMECLIDINIUM BROMIDE AND VILANTEROL TRIFENATATE 100; 62.5; 25 UG/1; UG/1; UG/1
1 POWDER RESPIRATORY (INHALATION) DAILY
Qty: 60 BLISTER | OUTPATIENT
Start: 2023-04-25

## 2023-04-25 RX ORDER — LIDOCAINE 4 G/G
1 PATCH TOPICAL DAILY
OUTPATIENT
Start: 2023-04-25

## 2023-04-25 RX ORDER — MULTIVITAMIN WITH FOLIC ACID 400 MCG
1 TABLET ORAL DAILY
OUTPATIENT
Start: 2023-04-25

## 2023-04-25 RX ORDER — RIFAMPIN 300 MG/1
600 CAPSULE ORAL DAILY
OUTPATIENT
Start: 2023-04-25

## 2023-04-25 RX ORDER — ERGOCALCIFEROL 1.25 MG/1
125 CAPSULE ORAL WEEKLY
OUTPATIENT
Start: 2023-04-25

## 2023-04-25 RX ORDER — ZINC SULFATE 50(220)MG
CAPSULE ORAL
OUTPATIENT
Start: 2023-04-25

## 2023-04-25 RX ORDER — ETHAMBUTOL HYDROCHLORIDE 100 MG/1
1000 TABLET, FILM COATED ORAL DAILY
OUTPATIENT
Start: 2023-04-25

## 2023-04-25 RX ORDER — GABAPENTIN 300 MG/1
300 CAPSULE ORAL 2 TIMES DAILY
OUTPATIENT
Start: 2023-04-25

## 2023-04-25 RX ORDER — BACILLUS COAGULANS 1B CELL
1 CAPSULE ORAL DAILY
Qty: 90 CAPSULE | Refills: 3 | Status: SHIPPED | OUTPATIENT
Start: 2023-04-25

## 2023-04-25 RX ORDER — AZITHROMYCIN 500 MG/1
500 TABLET, FILM COATED ORAL DAILY
OUTPATIENT
Start: 2023-04-25

## 2023-04-28 ENCOUNTER — OFFICE VISIT (OUTPATIENT)
Dept: FAMILY MEDICINE CLINIC | Facility: CLINIC | Age: 68
End: 2023-04-28

## 2023-04-28 VITALS
DIASTOLIC BLOOD PRESSURE: 75 MMHG | BODY MASS INDEX: 18.23 KG/M2 | SYSTOLIC BLOOD PRESSURE: 136 MMHG | TEMPERATURE: 98.5 F | WEIGHT: 134.4 LBS | OXYGEN SATURATION: 98 % | RESPIRATION RATE: 18 BRPM | HEART RATE: 71 BPM

## 2023-04-28 DIAGNOSIS — I25.10 ASCVD (ARTERIOSCLEROTIC CARDIOVASCULAR DISEASE): ICD-10-CM

## 2023-04-28 DIAGNOSIS — M46.1 SACROILIITIS, NOT ELSEWHERE CLASSIFIED (HCC): ICD-10-CM

## 2023-04-28 DIAGNOSIS — E44.0 MODERATE PROTEIN-CALORIE MALNUTRITION (HCC): ICD-10-CM

## 2023-04-28 DIAGNOSIS — M54.16 LUMBAR RADICULOPATHY: ICD-10-CM

## 2023-04-28 DIAGNOSIS — I50.31 ACUTE DIASTOLIC CONGESTIVE HEART FAILURE (HCC): ICD-10-CM

## 2023-04-28 DIAGNOSIS — Z00.00 MEDICARE ANNUAL WELLNESS VISIT, SUBSEQUENT: Primary | ICD-10-CM

## 2023-04-28 DIAGNOSIS — K21.9 GASTROESOPHAGEAL REFLUX DISEASE, UNSPECIFIED WHETHER ESOPHAGITIS PRESENT: ICD-10-CM

## 2023-04-28 DIAGNOSIS — J43.9 PULMONARY EMPHYSEMA, UNSPECIFIED EMPHYSEMA TYPE (HCC): ICD-10-CM

## 2023-04-28 RX ORDER — MORPHINE SULFATE 30 MG/1
30 CAPSULE, EXTENDED RELEASE ORAL DAILY
Qty: 30 CAPSULE | Refills: 0 | Status: SHIPPED | OUTPATIENT
Start: 2023-04-28 | End: 2023-05-01 | Stop reason: SDUPTHER

## 2023-04-28 NOTE — PROGRESS NOTES
Assessment and Plan:     Problem List Items Addressed This Visit    None       Preventive health issues were discussed with patient, and age appropriate screening tests were ordered as noted in patient's After Visit Summary  Personalized health advice and appropriate referrals for health education or preventive services given if needed, as noted in patient's After Visit Summary       History of Present Illness:     Patient presents for a Medicare Wellness Visit    HPI   Patient Care Team:  Valentino Bridgeman, MD as PCP - General  AL[facility] Don (Pain Medicine)     Review of Systems:     Review of Systems     Problem List:     Patient Active Problem List   Diagnosis    Encounter for hepatitis C screening test for low risk patient    Pain    Anxiety    Benign essential hypertension    Chronic obstructive pulmonary disease ([address] )    Screening for neurological condition    Chronic low back pain    Early satiety    GERD (gastroesophageal reflux disease)    Fatigue    Headache    Incisional hernia    Insomnia    Left knee pain    Neck pain    Osteoarthritis of both knees    Other chest pain    Other instability, right knee    Pain in joint of left shoulder    Pain of left hip    Weight loss, non-intentional    Lumbar spondylosis    Bulging of lumbar intervertebral disc    Low back pain    Displacement of lumbar intervertebral disc without myelopathy    Screening for AAA (abdominal aortic aneurysm)    Medicare annual wellness visit, initial    Lumbar radiculopathy    Uncomplicated opioid dependence ([address] )    Abnormal CT scan of lung    Upper respiratory tract infection    ASCVD (arteriosclerotic cardiovascular disease)    Controlled substance agreement signed    Tobacco use    Hyponatremia    Moderate protein-calorie malnutrition ([address] )    Sacroiliitis, not elsewhere classified ([address] )    Acute diastolic congestive heart failure ([address] )      Past Medical and Surgical History:     Past Medical History:   Diagnosis Date    Anxiety     Chronic pain disorder     back    COPD (chronic obstructive pulmonary disease) (HCC) na    Disease of thyroid gland     GERD (gastroesophageal reflux disease)     Headache(784 0)     Hyperlipidemia     Hypertension     Hyperthyroidism     last assessed: 10/28/16    NSTEMI (non-ST elevated myocardial infarction) ([facility] ) 4/13/2020    Pneumonia     Visual impairment      Past Surgical History:   Procedure Laterality Date    CARDIAC CATHETERIZATION  07/02/2020    EF 65% normal    COLONOSCOPY      EPIDURAL BLOCK INJECTION N/A 1/17/2019    Procedure: L5 S1 Lumbar Epidural Steroid Injection (30774);   Surgeon: Emiliano Ortiz MD;  [facility]: [facility];  Service: Pain Management     EPIDURAL BLOCK INJECTION Left 3/6/2019    Procedure: L4- L5 and L5-S1 transforaminal epidural steroid injection;  Surgeon: Emiliano Ortiz MD;  [facility]: [facility];  Service: Pain Management     EPIDURAL BLOCK INJECTION Left 8/3/2021    Procedure: S1 TRANSFORAMINAL EPIDURAL STEROID INJECTION;  Surgeon: Nazario Viera MD;  [facility];  Service: Pain Management     FL GUIDED NEEDLE PLAC BX/ASP/INJ  3/6/2019    INGUINAL HERNIA REPAIR Bilateral     KS INJECT SI JOINT ARTHRGRPHY&/ANES/STEROID W/JEANNIE Left 6/24/2021    Procedure: BLOCK / INJECTION SACROILIAC;  Surgeon: Nazario Viera MD;  [facility];  Service: Pain Management     RADIOFREQUENCY ABLATION Left 3/29/2022    Procedure: ABLATION RADIO FREQUENCY (RFA) - Left SIJ;  Surgeon: Nazario Viera MD;  [facility];  Service: Pain Management       Family History:     Family History   Problem Relation Age of Onset    Heart disease Mother     Heart disease Father         cardiac disorder    Heart disease Maternal Grandmother         cardiac disorder    Heart disease Maternal Grandfather         cardiac disorder    Heart disease Paternal Grandmother         cardiac disorder    Heart disease Paternal Grandfather         cardiac disorder      Social History:     Social History     Socioeconomic History    Marital status: Single     Spouse name: None    Number of children: None    Years of education: None    Highest education level: None   Occupational History    None   Tobacco Use    Smoking status: Every Day     Packs/day: 1 00     Years: 15 00     Pack years: 15 00     Types: Cigarettes    Smokeless tobacco: Never   Vaping Use    Vaping Use: Never used   Substance and Sexual Activity    Alcohol use: No    Drug use: No    Sexual activity: Not Currently   Other Topics Concern    None   Social History Narrative    None     Social Determinants of Health     Financial Resource Strain: Medium Risk    Difficulty of Paying Living Expenses: Somewhat hard   Food Insecurity: No Food Insecurity    Worried About Running Out of Food in the Last Year: Never true    Janet of Food in the Last Year: Never true   Transportation Needs: No Transportation Needs    Lack of Transportation (Medical): No    Lack of Transportation (Non-Medical): No   Physical Activity: Insufficiently Active    Days of Exercise per Week: 3 days    Minutes of Exercise per Session: 30 min   Stress: No Stress Concern Present    Feeling of Stress : Only a little   Social Connections: Socially Isolated    Frequency of Communication with Friends and Family:  Three times a week    Frequency of Social Gatherings with Friends and Family: Once a week    Attends Hoahaoism Services: Never    Active Member of Clubs or Organizations: No    Attends Club or Organization Meetings: Never    Marital Status: Never    Intimate Partner Violence: Not At Risk    Fear of Current or Ex-Partner: No    Emotionally Abused: No    Physically Abused: No    Sexually Abused: No   Housing Stability: [address] Unable to Pay for Housing in the Last Year: No    Number of [place] in the Last Year: 1    Unstable Housing in the Last Year: No      Medications and Allergies:     Current Outpatient Medications   Medication Sig Dispense Refill    albuterol (2 5 mg/3 mL) 0 083 % nebulizer solution Take 3 mL (2 5 mg total) by nebulization every 4 (four) hours as needed for wheezing or shortness of breath 300 mL 0    albuterol (PROVENTIL HFA,VENTOLIN HFA) 90 mcg/act inhaler inhale 2 puffs every 4 hours if needed for wheezing 18 g 0    albuterol (PROVENTIL HFA,VENTOLIN HFA) 90 mcg/act inhaler Inhale 2 puffs every 4 (four) hours as needed for wheezing 18 g 5    ALPRAZolam (XANAX) 0 25 mg tablet take 1 tablet by mouth three times a day if needed for anxiety 90 tablet 5    ascorbic acid (VITAMIN C) 250 mg tablet Take 500 mg by mouth daily      Aspirin Low Dose 81 MG EC tablet take 1 tablet by mouth once daily 90 tablet 0    atorvastatin (LIPITOR) 80 mg tablet take 1 tablet by mouth every evening 90 tablet 0    azithromycin (ZITHROMAX) 500 MG tablet Take 500 mg by mouth daily      clopidogrel (PLAVIX) 75 mg tablet take 1 tablet by mouth once daily 90 tablet 1    collagenase (Santyl) ointment Apply topically daily 15 g 5    Diclofenac Sodium (VOLTAREN) 1 % Apply 4 g topically 4 (four) times a day 500 g 0    DULoxetine (CYMBALTA) 60 mg delayed release capsule take 1 capsule by mouth once daily 90 capsule 3    ergocalciferol (ERGOCALCIFEROL) 1 25 MG (92126 UT) capsule Take 125 mcg by mouth once a week      ethambutol (MYAMBUTOL) 100 mg tablet Take 1,000 mg/kg by mouth daily      ferrous sulfate 325 (65 Fe) mg tablet take 1 tablet by mouth daily before breakfast 30 tablet 5    fluticasone-umeclidinium-vilanterol (Trelegy Ellipta) 100-62 5-25 mcg/actuation inhaler Inhale 1 puff daily Rinse mouth after use        furosemide (LASIX) 40 mg tablet Take 1 tablet (40 mg total) by mouth daily 30 tablet 0    gabapentin (NEURONTIN) 300 mg capsule Take 1 capsule (300 mg total) by mouth 2 (two) times a day 180 capsule 3    Lidocaine 4 % PTCH Place 1 patch on the skin daily      losartan (COZAAR) 50 mg tablet Take 1 tablet (50 mg total) by mouth daily 30 tablet 0    metoprolol succinate (TOPROL-XL) 25 mg 24 hr tablet take 1 tablet by mouth once daily 90 tablet 0    Multiple Vitamin (Tab-A-Douglas) TABS take 1 tablet by mouth once daily 30 tablet 11    oxyCODONE-acetaminophen (PERCOCET)  mg per tablet Take 1 tablet by mouth every 6 (six) hours as needed for moderate pain Max Daily Amount: 4 tablets 120 tablet 0    pantoprazole (PROTONIX) 40 mg tablet take 1 tablet by mouth once daily 90 tablet 0    Probiotic Product (Bacid) CAPS Take 1 capsule by mouth in the morning 90 capsule 3    rifampin (RIFADIN) 300 mg capsule Take 600 mg by mouth daily      zinc sulfate (ZINCATE) 220 mg capsule take 1 capsule by mouth once daily UNTIL 4/6/2023       No current facility-administered medications for this visit       No Known Allergies   Immunizations:     Immunization History   Administered Date(s) Administered    COVID-19 PFIZER VACCINE 0 3 ML IM 03/22/2021, 04/14/2021    COVID-19 Pfizer Vac BIVALENT Doug-sucrose 12 Yr+ IM (BOOSTER ONLY) 03/01/2023    H1N1, All Formulations 01/08/2010    INFLUENZA 11/03/2011, 01/01/2012, 01/01/2014, 08/26/2014, 08/01/2015, 08/15/2016, 09/23/2017, 09/09/2018, 08/31/2020, 09/13/2021    Influenza Quadrivalent Preservative Free 3 years and older IM 09/23/2017    Influenza, high dose seasonal 0 7 mL 08/31/2020    Influenza, seasonal, injectable 08/26/2014    Influenza, seasonal, injectable, preservative free 08/01/2015    Pneumococcal Conjugate 13-Valent 08/01/2015, 08/31/2020    Pneumococcal Conjugate PCV 7 01/01/2011    Pneumococcal Polysaccharide PPV23 09/09/2018    Tdap 08/26/2014    Tuberculin Skin Test-PPD Intradermal 10/04/2013, 10/23/2014    Zoster Vaccine Recombinant 06/17/2020, 08/21/2020      Health Maintenance:         Topic Date Due    Colorectal Cancer Screening  12/28/2026    Hepatitis C Screening Completed     There are no preventive care reminders to display for this patient  Medicare Screening Tests and Risk Assessments:     Erin Madera is here for his Subsequent Wellness visit  Last Medicare Wellness visit information reviewed, patient interviewed and updates made to the record today  Health Risk Assessment:   Patient rates overall health as good  Patient feels that their physical health rating is slightly better  Patient is satisfied with their life  Eyesight was rated as same  Hearing was rated as same  Patient feels that their emotional and mental health rating is slightly better  Patients states they are sometimes angry  Patient states they are often unusually tired/fatigued  Patient states that he has experienced no weight loss or gain in last 6 months  Depression Screening:   PHQ-2 Score: 0      Fall Risk Screening: In the past year, patient has experienced: history of falling in past year    Number of falls: 2 or more  Injured during fall?: No    Feels unsteady when standing or walking?: No    Worried about falling?: No      Home Safety:  Patient has trouble with stairs inside or outside of their home  Patient has working smoke alarms and has no working carbon monoxide detector  Home safety hazards include: none  Nutrition:   Current diet is Regular and Limited junk food  Medications:   Patient is not currently taking any over-the-counter supplements  Patient is able to manage medications  Activities of Daily Living (ADLs)/Instrumental Activities of Daily Living (IADLs):   Walk and transfer into and out of bed and chair?: Yes  Dress and groom yourself?: Yes    Bathe or shower yourself?: Yes    Feed yourself?  Yes  Do your laundry/housekeeping?: No  Manage your money, pay your bills and track your expenses?: No  Make your own meals?: No    Do your own shopping?: No    Previous Hospitalizations:   Any hospitalizations or ED visits within the last 12 months?: Yes    How many hospitalizations have you had in the last year?: 1-2    Advance Care Planning:   Living will: No    Durable POA for healthcare: Yes    Advanced directive: No      Comments: DaughterRanjana  Cognitive Screening:   Provider or family/friend/caregiver concerned regarding cognition?: No    PREVENTIVE SCREENINGS      Cardiovascular Screening:    General: Screening Current      Diabetes Screening:     General: Screening Current      Colorectal Cancer Screening:     General: Screening Current      Osteoporosis Screening:    General: Screening Not Indicated      Abdominal Aortic Aneurysm (AAA) Screening:    Risk factors include: age between 73-67 yo and tobacco use        General: Screening Current      Lung Cancer Screening:     General: Screening Not Indicated      Hepatitis C Screening:    General: Screening Current    Screening, Brief Intervention, and Referral to Treatment (SBIRT)    Screening  Typical number of drinks in a day: 0  Typical number of drinks in a week: 0  Interpretation: Low risk drinking behavior  Single Item Drug Screening:  How often have you used an illegal drug (including marijuana) or a prescription medication for non-medical reasons in the past year? never    Single Item Drug Screen Score: 0  Interpretation: Negative screen for possible drug use disorder    Brief Intervention  Alcohol & drug use screenings were reviewed  No concerns regarding substance use disorder identified  Review of Current Opioid Use    Opioid Risk Tool (ORT) Interpretation: Complete Opioid Risk Tool (ORT)    No results found       Physical Exam:     /75 (BP Location: Left arm, Patient Position: Sitting, Cuff Size: Standard)   Pulse 71   Temp 98 5 °F (36 9 °C) (Tympanic)   Resp 18   Wt 61 kg (134 lb 6 4 oz)   SpO2 98%   BMI 18 23 kg/m²     Physical Exam     Camille Flower MD

## 2023-04-28 NOTE — PATIENT INSTRUCTIONS
Medicare Preventive Visit Patient Instructions  Thank you for completing your Welcome to Medicare Visit or Medicare Annual Wellness Visit today  Your next wellness visit will be due in one year (4/28/2024)  The screening/preventive services that you may require over the next 5-10 years are detailed below  Some tests may not apply to you based off risk factors and/or age  Screening tests ordered at today's visit but not completed yet may show as past due  Also, please note that scanned in results may not display below  Preventive Screenings:  Service Recommendations Previous Testing/Comments   Colorectal Cancer Screening  · Colonoscopy    · Fecal Occult Blood Test (FOBT)/Fecal Immunochemical Test (FIT)  · Fecal DNA/Cologuard Test  · Flexible Sigmoidoscopy Age: 39-70 years old   Colonoscopy: every 10 years (May be performed more frequently if at higher risk)  OR  FOBT/FIT: every 1 year  OR  Cologuard: every 3 years  OR  Sigmoidoscopy: every 5 years  Screening may be recommended earlier than age 39 if at higher risk for colorectal cancer  Also, an individualized decision between you and your healthcare provider will decide whether screening between the ages of 74-80 would be appropriate   Colonoscopy: 03/02/2023  FOBT/FIT: Not on file  Cologuard: Not on file  Sigmoidoscopy: Not on file    Screening Current     Prostate Cancer Screening Individualized decision between patient and health care provider in men between ages of 53-78   Medicare will cover every 12 months beginning on the day after your 50th birthday PSA: No results in last 5 years           Hepatitis C Screening Once for adults born between 1945 and 1965  More frequently in patients at high risk for Hepatitis C Hep C Antibody: 04/29/2019    Screening Current   Diabetes Screening 1-2 times per year if you're at risk for diabetes or have pre-diabetes Fasting glucose: 121 mg/dL (4/28/2022)  A1C: 5 4 (11/4/2022)  Screening Current   Cholesterol Screening Once every 5 years if you don't have a lipid disorder  May order more often based on risk factors  Lipid panel: 04/28/2022  Screening Current      Other Preventive Screenings Covered by Medicare:  1  Abdominal Aortic Aneurysm (AAA) Screening: covered once if your at risk  You're considered to be at risk if you have a family history of AAA or a male between the age of 73-68 who smoking at least 100 cigarettes in your lifetime  2  Lung Cancer Screening: covers low dose CT scan once per year if you meet all of the following conditions: (1) Age 50-69; (2) No signs or symptoms of lung cancer; (3) Current smoker or have quit smoking within the last 15 years; (4) You have a tobacco smoking history of at least 20 pack years (packs per day x number of years you smoked); (5) You get a written order from a healthcare provider  3  Glaucoma Screening: covered annually if you're considered high risk: (1) You have diabetes OR (2) Family history of glaucoma OR (3)  aged 48 and older OR (3)  American aged 72 and older  3  Osteoporosis Screening: covered every 2 years if you meet one of the following conditions: (1) Have a vertebral abnormality; (2) On glucocorticoid therapy for more than 3 months; (3) Have primary hyperparathyroidism; (4) On osteoporosis medications and need to assess response to drug therapy  5  HIV Screening: covered annually if you're between the age of 12-76  Also covered annually if you are younger than 13 and older than 72 with risk factors for HIV infection  For pregnant patients, it is covered up to 3 times per pregnancy      Immunizations:  Immunization Recommendations   Influenza Vaccine Annual influenza vaccination during flu season is recommended for all persons aged >= 6 months who do not have contraindications   Pneumococcal Vaccine   * Pneumococcal conjugate vaccine = PCV13 (Prevnar 13), PCV15 (Vaxneuvance), PCV20 (Prevnar 20)  * Pneumococcal polysaccharide vaccine = PPSV23 (Pneumovax) Adults 2364 years old: 1-3 doses may be recommended based on certain risk factors  Adults 72 years old: 1-2 doses may be recommended based off what pneumonia vaccine you previously received   Hepatitis B Vaccine 3 dose series if at intermediate or high risk (ex: diabetes, end stage renal disease, liver disease)   Tetanus (Td) Vaccine - COST NOT COVERED BY MEDICARE PART B Following completion of primary series, a booster dose should be given every 10 years to maintain immunity against tetanus  Td may also be given as tetanus wound prophylaxis  Tdap Vaccine - COST NOT COVERED BY MEDICARE PART B Recommended at least once for all adults  For pregnant patients, recommended with each pregnancy  Shingles Vaccine (Shingrix) - COST NOT COVERED BY MEDICARE PART B  2 shot series recommended in those aged 48 and above     Health Maintenance Due:      Topic Date Due    Colorectal Cancer Screening  12/28/2026    Hepatitis C Screening  Completed     Immunizations Due:  There are no preventive care reminders to display for this patient  Advance Directives   What are advance directives? Advance directives are legal documents that state your wishes and plans for medical care  These plans are made ahead of time in case you lose your ability to make decisions for yourself  Advance directives can apply to any medical decision, such as the treatments you want, and if you want to donate organs  What are the types of advance directives? There are many types of advance directives, and each state has rules about how to use them  You may choose a combination of any of the following:  · Living will: This is a written record of the treatment you want  You can also choose which treatments you do not want, which to limit, and which to stop at a certain time  This includes surgery, medicine, IV fluid, and tube feedings  · Durable power of  for healthcare [employer] [facility]):   This is a written record that states who you want to make healthcare choices for you when you are unable to make them for yourself  This person, called a proxy, is usually a family member or a friend  You may choose more than 1 proxy  · Do not resuscitate (DNR) order:  A DNR order is used in case your heart stops beating or you stop breathing  It is a request not to have certain forms of treatment, such as CPR  A DNR order may be included in other types of advance directives  · Medical directive: This covers the care that you want if you are in a coma, near death, or unable to make decisions for yourself  You can list the treatments you want for each condition  Treatment may include pain medicine, surgery, blood transfusions, dialysis, IV or tube feedings, and a ventilator (breathing machine)  · Values history: This document has questions about your views, beliefs, and how you feel and think about life  This information can help others choose the care that you would choose  Why are advance directives important? An advance directive helps you control your care  Although spoken wishes may be used, it is better to have your wishes written down  Spoken wishes can be misunderstood, or not followed  Treatments may be given even if you do not want them  An advance directive may make it easier for your family to make difficult choices about your care  Fall Prevention    Fall prevention  includes ways to make your home and other areas safer  It also includes ways you can move more carefully to prevent a fall  Health conditions that cause changes in your blood pressure, vision, or muscle strength and coordination may increase your risk for falls  Medicines may also increase your risk for falls if they make you dizzy, weak, or sleepy  Fall prevention tips:   · Stand or sit up slowly  · Use assistive devices as directed  · Wear shoes that fit well and have soles that   · Wear a personal alarm  · Stay active  · Manage your medical conditions  Home Safety Tips:  · Add items to prevent falls in the bathroom  · Keep paths clear  · Install bright lights in your home  · Keep items you use often on shelves within reach  · Paint or place reflective tape on the edges of your stairs  Cigarette Smoking and Your Health   Risks to your health if you smoke:  Nicotine and other chemicals found in tobacco damage every cell in your body  Even if you are a light smoker, you have an increased risk for cancer, heart disease, and lung disease  If you are pregnant or have diabetes, smoking increases your risk for complications  Benefits to your health if you stop smoking:   · You decrease respiratory symptoms such as coughing, wheezing, and shortness of breath  · You reduce your risk for cancers of the lung, mouth, throat, kidney, bladder, pancreas, stomach, and cervix  If you already have cancer, you increase the benefits of chemotherapy  You also reduce your risk for cancer returning or a second cancer from developing  · You reduce your risk for heart disease, blood clots, heart attack, and stroke  · You reduce your risk for lung infections, and diseases such as pneumonia, asthma, chronic bronchitis, and emphysema  · Your circulation improves  More oxygen can be delivered to your body  If you have diabetes, you lower your risk for complications, such as kidney, artery, and eye diseases  You also lower your risk for nerve damage  Nerve damage can lead to amputations, poor vision, and blindness  · You improve your body's ability to heal and to fight infections  For more information and support to stop smoking:   · [employer] [facility]  gov  Phone: 0- 893 - 620-9400  Web Address: [employer]  Underweight  Underweight is defined as having a body mass index (BMI) of less than 18 5 kg/m2   Anorexia  is a loss of appetite, decreased food intake, or both  Your appetite naturally decreases as you get older  You also get full faster than you used to   This occurs because your body needs less energy  Other body changes can also lead to a decreased appetite  Even though some appetite loss is normal, you still need to get enough calories and nutrients to keep you healthy  You can start to lose too much weight if you do not eat as much food as your body needs  Unwanted weight loss can cause health problems, or worsen health problems you already have  You can also become dehydrated if you do not drink enough liquid  How to eat healthy and get enough nutrients:   · Choose healthy foods  Eat a variety of fruits, vegetables, whole grains, low-fat dairy foods, lean meats, and other protein foods  Limit foods high in fat, sugar, and salt  Limit or avoid alcohol as directed  Work with a dietitian to help you plan your meals if you need to follow a special diet  A dietitian can also teach you how to modify foods if you have trouble chewing or swallowing  · Snack on healthy foods between meals  if you only eat a small amount during meals  Snacks provide extra healthy nutrients and calories between meals  Examples include fruit, cheese, and whole grain crackers  · Drink liquids as directed  to avoid dehydration  Drink liquids between meals if they cause you to get full too quickly during meals  Ask how much liquid to drink each day and which liquids are best for you  · Use herbs, spices, and flavor enhancers to add flavor to foods  Avoid using herbs and spice blends that also contain sodium  Ask your healthcare provider or dietitian about flavor enhancers  Flavor enhancers with ham, natural mena, and roast beef flavors can also be sprinkled on food to add flavor  · Share meals with others as often as you can  Eating with others may help you to eat better during meal time  Ask family members, neighbors, or friends to join you for lunch  There are also senior centers where you can meet people, and share meals with them     · Ask family and friends for help  with shopping or preparing foods  Ask for a ride to the grocery store, if needed  Narcotic (Opioid) Safety    Use narcotics safely:  · Take prescribed narcotics exactly as directed  · Do not give narcotics to others or take narcotics that belong to someone else  · Do not mix narcotics without medicines or alcohol  · Do not drive or operate heavy machinery after you take the narcotic  · Monitor for side effects and notify your healthcare provider if you experienced side effects such as nausea, sleepiness, itching, or trouble thinking clearly  Manage constipation:    Constipation is the most common side effect of narcotic medicine  Constipation is when you have hard, dry bowel movements, or you go longer than usual between bowel movements  Tell your healthcare provider about all changes in your bowel movements while you are taking narcotics  He or she may recommend laxative medicine to help you have a bowel movement  He or she may also change the kind of narcotic you are taking, or change when you take it  The following are more ways you can prevent or relieve constipation:    · Drink liquids as directed  You may need to drink extra liquids to help soften and move your bowels  Ask how much liquid to drink each day and which liquids are best for you  · Eat high-fiber foods  This may help decrease constipation by adding bulk to your bowel movements  High-fiber foods include fruits, vegetables, whole-grain breads and cereals, and beans  Your healthcare provider or dietitian can help you create a high-fiber meal plan  Your provider may also recommend a fiber supplement if you cannot get enough fiber from food  · Exercise regularly  Regular physical activity can help stimulate your intestines  Walking is a good exercise to prevent or relieve constipation  Ask which exercises are best for you  · Schedule a time each day to have a bowel movement  This may help train your body to have regular bowel movements   Bend forward while you are on the toilet to help move the bowel movement out  Sit on the toilet for at least 10 minutes, even if you do not have a bowel movement  Store narcotics safely:   · Store narcotics where others cannot easily get them  Keep them in a locked cabinet or secure area  Do not  keep them in a purse or other bag you carry with you  A person may be looking for something else and find the narcotics  · Make sure narcotics are stored out of the reach of children  A child can easily overdose on narcotics  Narcotics may look like candy to a small child  The best way to dispose of narcotics: The laws vary by country and area  In the [place], the best way is to return the narcotics through a take-back program  This program is offered by the [employer] ([employer])  The following are options for using the program:  · Take the narcotics to a BINH collection site  The site is often a law enforcement center  Call your local law enforcement center for scheduled take-back days in your area  You will be given information on where to go if the collection site is in a different location  · Take the narcotics to an approved pharmacy or hospital   A pharmacy or hospital may be set up as a collection site  You will need to ask if it is a BINH collection site if you were not directed there  A pharmacy or doctor's office may not be able to take back narcotics unless it is a BINH site  · Use a mail-back system  This means you are given containers to put the narcotics into  You will then mail them in the containers  · Use a take-back drop box  This is a place to leave the narcotics at any time  People and animals will not be able to get into the box  Your local law enforcement agency can tell you where to find a drop box in your area  Other ways to manage pain:   · Ask your healthcare provider about non-narcotic medicines to control pain    Nonprescription medicines include NSAIDs (such as ibuprofen) and acetaminophen  Prescription medicines include muscle relaxers, antidepressants, and steroids  · Pain may be managed without any medicines  Some ways to relieve pain include massage, aromatherapy, or meditation  Physical or occupational therapy may also help  For more information:   · Drug Enforcement Administration  [facility] [address]  Phone: 8- 794 - [phone]  Web Address: [facility]/drug_disposal/    · [address]  and Drug Administration  [place]  [place] , [address] [facility]  Phone: 7- 933 - [phone]  Web Address: http://[employer]/     © Copyright [employer] 2018 Information is for End User's use only and may not be sold, redistributed or otherwise used for commercial purposes   All illustrations and images included in CareNotes® are the copyrighted property of A D A M , Inc  or [address] [place] [address]

## 2023-04-28 NOTE — PROGRESS NOTES
Name: Nette Kaur      : 1955      MRN: [id number]  Encounter Provider: Stephan Marlow MD  Encounter Date: 2023   Encounter department: [address] [place] [address]     1  Pulmonary emphysema, unspecified emphysema type ([facility] )  Assessment & Plan:  Stable  Continue current  2  ASCVD (arteriosclerotic cardiovascular disease)  Assessment & Plan:  Stable  Continue current  3  Acute diastolic congestive heart failure [facility])  Assessment & Plan:  Wt Readings from Last 3 Encounters:   23 61 kg (134 lb 6 4 oz)   23 65 7 kg (144 lb 12 8 oz)   10/28/22 56 2 kg (124 lb)         Stable  Continue current  4  Lumbar radiculopathy  -     morphine (AVINza) 30 MG 24 hr capsule; Take 1 capsule (30 mg total) by mouth daily Max Daily Amount: 30 mg    5  Moderate protein-calorie malnutrition ([facility] )    6  Sacroiliitis, not elsewhere classified [facility])  Assessment & Plan:  Stable  Continue current  7  Gastroesophageal reflux disease, unspecified whether esophagitis present  Assessment & Plan:  Stable  Continue current  Subjective      He complains bitterly of LBP  He has no red flags  He no longer gets relief from his current dose of Percocet  His COPD is stable  He has no F/C  He has no cough  He denies wheeze or sputum production  His weight is stable  He has ASCVD  He denies CP, palpitations, syncope or near syncope  Review of Systems   All other systems reviewed and are negative        Current Outpatient Medications on File Prior to Visit   Medication Sig    albuterol (2 5 mg/3 mL) 0 083 % nebulizer solution Take 3 mL (2 5 mg total) by nebulization every 4 (four) hours as needed for wheezing or shortness of breath    albuterol (PROVENTIL HFA,VENTOLIN HFA) 90 mcg/act inhaler inhale 2 puffs every 4 hours if needed for wheezing    albuterol (PROVENTIL HFA,VENTOLIN HFA) 90 mcg/act inhaler Inhale 2 puffs every 4 (four) hours as needed for wheezing    ALPRAZolam (XANAX) 0 25 mg tablet take 1 tablet by mouth three times a day if needed for anxiety    ascorbic acid (VITAMIN C) 250 mg tablet Take 500 mg by mouth daily    Aspirin Low Dose 81 MG EC tablet take 1 tablet by mouth once daily    atorvastatin (LIPITOR) 80 mg tablet take 1 tablet by mouth every evening    azithromycin (ZITHROMAX) 500 MG tablet Take 500 mg by mouth daily    clopidogrel (PLAVIX) 75 mg tablet take 1 tablet by mouth once daily    collagenase (Santyl) ointment Apply topically daily    Diclofenac Sodium (VOLTAREN) 1 % Apply 4 g topically 4 (four) times a day    DULoxetine (CYMBALTA) 60 mg delayed release capsule take 1 capsule by mouth once daily    ergocalciferol (ERGOCALCIFEROL) 1 25 MG (38680 UT) capsule Take 125 mcg by mouth once a week    ethambutol (MYAMBUTOL) 100 mg tablet Take 1,000 mg/kg by mouth daily    ferrous sulfate 325 (65 Fe) mg tablet take 1 tablet by mouth daily before breakfast    fluticasone-umeclidinium-vilanterol (Trelegy Ellipta) 100-62 5-25 mcg/actuation inhaler Inhale 1 puff daily Rinse mouth after use      furosemide (LASIX) 40 mg tablet Take 1 tablet (40 mg total) by mouth daily    gabapentin (NEURONTIN) 300 mg capsule Take 1 capsule (300 mg total) by mouth 2 (two) times a day    Lidocaine 4 % PTCH Place 1 patch on the skin daily    losartan (COZAAR) 50 mg tablet Take 1 tablet (50 mg total) by mouth daily    metoprolol succinate (TOPROL-XL) 25 mg 24 hr tablet take 1 tablet by mouth once daily    Multiple Vitamin (Tab-A-Douglas) TABS take 1 tablet by mouth once daily    oxyCODONE-acetaminophen (PERCOCET)  mg per tablet Take 1 tablet by mouth every 6 (six) hours as needed for moderate pain Max Daily Amount: 4 tablets    pantoprazole (PROTONIX) 40 mg tablet take 1 tablet by mouth once daily    Probiotic Product (Bacid) CAPS Take 1 capsule by mouth in the morning    rifampin (RIFADIN) 300 mg capsule Take 600 mg by mouth daily  zinc sulfate (ZINCATE) 220 mg capsule take 1 capsule by mouth once daily UNTIL 4/6/2023       Objective     /75 (BP Location: Left arm, Patient Position: Sitting, Cuff Size: Standard)   Pulse 71   Temp 98 5 °F (36 9 °C) (Tympanic)   Resp 18   Wt 61 kg (134 lb 6 4 oz)   SpO2 98%   BMI 18 23 kg/m²     Physical Exam  Vitals and nursing note reviewed  Constitutional:       Appearance: Normal appearance  He is normal weight  Cardiovascular:      Rate and Rhythm: Normal rate and regular rhythm  Pulses: Normal pulses  Heart sounds: Normal heart sounds  Pulmonary:      Effort: Pulmonary effort is normal       Breath sounds: Normal breath sounds  Abdominal:      General: Abdomen is flat  Bowel sounds are normal       Palpations: Abdomen is soft  Musculoskeletal:         General: Normal range of motion  Cervical back: Normal range of motion and neck supple  Skin:     General: Skin is warm and dry  Capillary Refill: Capillary refill takes less than 2 seconds  Neurological:      General: No focal deficit present  Mental Status: He is alert and oriented to person, place, and time  Mental status is at baseline  Psychiatric:         Mood and Affect: Mood normal          Behavior: Behavior normal          Thought Content:  Thought content normal          Judgment: Judgment normal        Todd Hagen MD

## 2023-05-01 ENCOUNTER — TELEPHONE (OUTPATIENT)
Dept: FAMILY MEDICINE CLINIC | Facility: CLINIC | Age: 68
End: 2023-05-01

## 2023-05-01 DIAGNOSIS — G89.4 CHRONIC PAIN SYNDROME: ICD-10-CM

## 2023-05-01 DIAGNOSIS — G47.00 INSOMNIA, UNSPECIFIED TYPE: ICD-10-CM

## 2023-05-01 DIAGNOSIS — M54.16 LUMBAR RADICULOPATHY: ICD-10-CM

## 2023-05-01 DIAGNOSIS — M47.816 LUMBAR SPONDYLOSIS: ICD-10-CM

## 2023-05-01 DIAGNOSIS — I21.4 NSTEMI (NON-ST ELEVATED MYOCARDIAL INFARCTION) (HCC): ICD-10-CM

## 2023-05-01 RX ORDER — MORPHINE SULFATE 30 MG/1
30 CAPSULE, EXTENDED RELEASE ORAL DAILY
Qty: 30 CAPSULE | Refills: 0 | Status: SHIPPED | OUTPATIENT
Start: 2023-05-01

## 2023-05-01 RX ORDER — OXYCODONE AND ACETAMINOPHEN 10; 325 MG/1; MG/1
1 TABLET ORAL EVERY 6 HOURS PRN
Qty: 120 TABLET | Refills: 0 | Status: SHIPPED | OUTPATIENT
Start: 2023-05-01

## 2023-05-01 NOTE — TELEPHONE ENCOUNTER
[facility] sent fax to try and send morphine tablets   States they are probably covered and wont need prior [facility]

## 2023-05-01 NOTE — ASSESSMENT & PLAN NOTE
Wt Readings from Last 3 Encounters:   04/28/23 61 kg (134 lb 6 4 oz)   03/28/23 65 7 kg (144 lb 12 8 oz)   10/28/22 56 2 kg (124 lb)         Stable  Continue current

## 2023-05-02 ENCOUNTER — TELEPHONE (OUTPATIENT)
Dept: FAMILY MEDICINE CLINIC | Facility: CLINIC | Age: 68
End: 2023-05-02

## 2023-05-02 RX ORDER — ASPIRIN 81 MG/1
81 TABLET ORAL DAILY
Qty: 90 TABLET | Refills: 0 | Status: SHIPPED | OUTPATIENT
Start: 2023-05-02

## 2023-05-02 RX ORDER — ATORVASTATIN CALCIUM 80 MG/1
80 TABLET, FILM COATED ORAL EVERY EVENING
Qty: 90 TABLET | Refills: 0 | Status: SHIPPED | OUTPATIENT
Start: 2023-05-02

## 2023-05-02 NOTE — TELEPHONE ENCOUNTER
No this is new  I did the prior auth yesterday and they were asking if a formulary would be less effective and the morphine er tablets was an option   Can you send those in?

## 2023-05-03 ENCOUNTER — TELEPHONE (OUTPATIENT)
Dept: FAMILY MEDICINE CLINIC | Facility: CLINIC | Age: 68
End: 2023-05-03

## 2023-05-03 DIAGNOSIS — M47.816 LUMBAR SPONDYLOSIS: Primary | ICD-10-CM

## 2023-05-03 DIAGNOSIS — M54.16 LUMBAR RADICULOPATHY: ICD-10-CM

## 2023-05-03 DIAGNOSIS — G89.4 CHRONIC PAIN SYNDROME: ICD-10-CM

## 2023-05-03 RX ORDER — MORPHINE SULFATE 15 MG/1
15 TABLET, FILM COATED, EXTENDED RELEASE ORAL 2 TIMES DAILY
Qty: 56 TABLET | Refills: 0 | Status: SHIPPED | OUTPATIENT
Start: 2023-06-28 | End: 2023-07-26

## 2023-05-03 RX ORDER — MORPHINE SULFATE 15 MG/1
15 TABLET, FILM COATED, EXTENDED RELEASE ORAL 2 TIMES DAILY
Qty: 56 TABLET | Refills: 0 | Status: SHIPPED | OUTPATIENT
Start: 2023-05-31 | End: 2023-06-28

## 2023-05-03 RX ORDER — MORPHINE SULFATE 15 MG/1
15 TABLET, FILM COATED, EXTENDED RELEASE ORAL 2 TIMES DAILY
Qty: 56 TABLET | Refills: 0 | Status: SHIPPED | OUTPATIENT
Start: 2023-05-03 | End: 2023-05-31

## 2023-05-04 DIAGNOSIS — J44.9 CHRONIC OBSTRUCTIVE PULMONARY DISEASE, UNSPECIFIED COPD TYPE (HCC): ICD-10-CM

## 2023-05-04 DIAGNOSIS — F41.9 ANXIETY: ICD-10-CM

## 2023-05-05 RX ORDER — ALPRAZOLAM 0.25 MG/1
0.25 TABLET ORAL 3 TIMES DAILY PRN
Qty: 90 TABLET | Refills: 0 | Status: SHIPPED | OUTPATIENT
Start: 2023-05-05

## 2023-05-05 RX ORDER — ALBUTEROL SULFATE 90 UG/1
2 AEROSOL, METERED RESPIRATORY (INHALATION) EVERY 6 HOURS PRN
Qty: 18 G | Refills: 0 | Status: SHIPPED | OUTPATIENT
Start: 2023-05-05

## 2023-05-08 DIAGNOSIS — I10 BENIGN ESSENTIAL HYPERTENSION: ICD-10-CM

## 2023-05-08 DIAGNOSIS — I50.31 ACUTE DIASTOLIC CONGESTIVE HEART FAILURE (HCC): ICD-10-CM

## 2023-05-08 DIAGNOSIS — G89.4 CHRONIC PAIN SYNDROME: ICD-10-CM

## 2023-05-10 RX ORDER — MULTIVITAMIN WITH FOLIC ACID 400 MCG
1 TABLET ORAL DAILY
Qty: 30 TABLET | Refills: 0 | Status: SHIPPED | OUTPATIENT
Start: 2023-05-10

## 2023-05-10 RX ORDER — FERROUS SULFATE 325(65) MG
1 TABLET ORAL
Qty: 30 TABLET | Refills: 0 | Status: SHIPPED | OUTPATIENT
Start: 2023-05-10

## 2023-05-10 RX ORDER — BACILLUS COAGULANS 1B CELL
1 CAPSULE ORAL DAILY
Qty: 90 CAPSULE | Refills: 0 | Status: SHIPPED | OUTPATIENT
Start: 2023-05-10

## 2023-05-10 RX ORDER — FUROSEMIDE 40 MG/1
40 TABLET ORAL DAILY
Qty: 30 TABLET | Refills: 0 | Status: SHIPPED | OUTPATIENT
Start: 2023-05-10

## 2023-05-18 DIAGNOSIS — L89.90 PRESSURE INJURY OF SKIN, UNSPECIFIED INJURY STAGE, UNSPECIFIED LOCATION: ICD-10-CM

## 2023-05-18 DIAGNOSIS — G47.00 INSOMNIA, UNSPECIFIED TYPE: ICD-10-CM

## 2023-05-19 RX ORDER — CLOPIDOGREL BISULFATE 75 MG/1
75 TABLET ORAL DAILY
Qty: 90 TABLET | Refills: 0 | Status: SHIPPED | OUTPATIENT
Start: 2023-05-19

## 2023-05-19 RX ORDER — COLLAGENASE SANTYL 250 [ARB'U]/G
OINTMENT TOPICAL DAILY
Qty: 15 G | Refills: 0 | Status: SHIPPED | OUTPATIENT
Start: 2023-05-19

## 2023-05-25 ENCOUNTER — TELEPHONE (OUTPATIENT)
Dept: FAMILY MEDICINE CLINIC | Facility: CLINIC | Age: 68
End: 2023-05-25

## 2023-05-29 DIAGNOSIS — G89.4 CHRONIC PAIN SYNDROME: ICD-10-CM

## 2023-05-29 DIAGNOSIS — M54.16 LUMBAR RADICULOPATHY: ICD-10-CM

## 2023-05-29 DIAGNOSIS — M47.816 LUMBAR SPONDYLOSIS: ICD-10-CM

## 2023-05-29 DIAGNOSIS — I50.31 ACUTE DIASTOLIC CONGESTIVE HEART FAILURE (HCC): ICD-10-CM

## 2023-05-30 RX ORDER — LOSARTAN POTASSIUM 50 MG/1
50 TABLET ORAL DAILY
Qty: 30 TABLET | Refills: 0 | Status: SHIPPED | OUTPATIENT
Start: 2023-05-30

## 2023-05-30 RX ORDER — OXYCODONE AND ACETAMINOPHEN 10; 325 MG/1; MG/1
1 TABLET ORAL EVERY 6 HOURS PRN
Qty: 120 TABLET | Refills: 0 | Status: SHIPPED | OUTPATIENT
Start: 2023-05-30

## 2023-05-30 RX ORDER — MORPHINE SULFATE 15 MG/1
15 TABLET, FILM COATED, EXTENDED RELEASE ORAL 2 TIMES DAILY
Qty: 56 TABLET | Refills: 0 | Status: SHIPPED | OUTPATIENT
Start: 2023-05-30 | End: 2023-06-27

## 2023-06-01 ENCOUNTER — TELEPHONE (OUTPATIENT)
Dept: FAMILY MEDICINE CLINIC | Facility: CLINIC | Age: 68
End: 2023-06-01

## 2023-06-01 NOTE — TELEPHONE ENCOUNTER
Alice Bates, this is [employer]  I've been through the hospital, the nursing home and rehab at the house  And my daughter and physical therapist says I need Doctor Alesha's approval, that I could be able to drive again and do regular routines around the house  Please call back and let me know  I really do need that approval from Doctor Cyrus Mendez  Thank you  My number is [phone]

## 2023-06-07 DIAGNOSIS — I10 BENIGN ESSENTIAL HYPERTENSION: ICD-10-CM

## 2023-06-07 DIAGNOSIS — G47.00 INSOMNIA, UNSPECIFIED TYPE: ICD-10-CM

## 2023-06-07 RX ORDER — ASPIRIN 81 MG/1
81 TABLET ORAL DAILY
Qty: 90 TABLET | Refills: 0 | Status: SHIPPED | OUTPATIENT
Start: 2023-06-07

## 2023-06-07 RX ORDER — FERROUS SULFATE 325(65) MG
1 TABLET ORAL
Qty: 30 TABLET | Refills: 0 | Status: SHIPPED | OUTPATIENT
Start: 2023-06-07

## 2023-06-08 DIAGNOSIS — J44.9 CHRONIC OBSTRUCTIVE PULMONARY DISEASE, UNSPECIFIED COPD TYPE (HCC): ICD-10-CM

## 2023-06-09 RX ORDER — ALBUTEROL SULFATE 90 UG/1
2 AEROSOL, METERED RESPIRATORY (INHALATION) EVERY 6 HOURS PRN
Qty: 18 G | Refills: 0 | Status: SHIPPED | OUTPATIENT
Start: 2023-06-09

## 2023-06-15 DIAGNOSIS — G89.4 CHRONIC PAIN SYNDROME: ICD-10-CM

## 2023-06-15 DIAGNOSIS — I50.31 ACUTE DIASTOLIC CONGESTIVE HEART FAILURE (HCC): ICD-10-CM

## 2023-06-16 RX ORDER — FUROSEMIDE 40 MG/1
40 TABLET ORAL DAILY
Qty: 30 TABLET | Refills: 0 | Status: SHIPPED | OUTPATIENT
Start: 2023-06-16

## 2023-06-16 RX ORDER — MULTIVITAMIN WITH FOLIC ACID 400 MCG
1 TABLET ORAL DAILY
Qty: 30 TABLET | Refills: 0 | Status: SHIPPED | OUTPATIENT
Start: 2023-06-16

## 2023-06-26 DIAGNOSIS — M54.16 LUMBAR RADICULOPATHY: ICD-10-CM

## 2023-06-26 DIAGNOSIS — G89.4 CHRONIC PAIN SYNDROME: ICD-10-CM

## 2023-06-26 DIAGNOSIS — M47.816 LUMBAR SPONDYLOSIS: ICD-10-CM

## 2023-06-26 RX ORDER — MORPHINE SULFATE 15 MG/1
15 TABLET, FILM COATED, EXTENDED RELEASE ORAL 2 TIMES DAILY
Qty: 56 TABLET | Refills: 0 | Status: SHIPPED | OUTPATIENT
Start: 2023-06-26 | End: 2023-07-24

## 2023-06-26 RX ORDER — OXYCODONE AND ACETAMINOPHEN 10; 325 MG/1; MG/1
1 TABLET ORAL EVERY 6 HOURS PRN
Qty: 120 TABLET | Refills: 0 | Status: SHIPPED | OUTPATIENT
Start: 2023-06-26

## 2023-06-27 DIAGNOSIS — J44.9 CHRONIC OBSTRUCTIVE PULMONARY DISEASE, UNSPECIFIED COPD TYPE (HCC): ICD-10-CM

## 2023-06-27 DIAGNOSIS — R52 PAIN: ICD-10-CM

## 2023-06-27 DIAGNOSIS — M47.816 LUMBAR SPONDYLOSIS: ICD-10-CM

## 2023-06-27 DIAGNOSIS — I50.31 ACUTE DIASTOLIC CONGESTIVE HEART FAILURE (HCC): ICD-10-CM

## 2023-06-27 DIAGNOSIS — L89.90 PRESSURE INJURY OF SKIN, UNSPECIFIED INJURY STAGE, UNSPECIFIED LOCATION: ICD-10-CM

## 2023-06-27 RX ORDER — COLLAGENASE SANTYL 250 [ARB'U]/G
OINTMENT TOPICAL DAILY
Qty: 15 G | Refills: 0 | Status: SHIPPED | OUTPATIENT
Start: 2023-06-27

## 2023-06-27 RX ORDER — ALBUTEROL SULFATE 90 UG/1
2 AEROSOL, METERED RESPIRATORY (INHALATION) EVERY 4 HOURS PRN
Qty: 18 G | Refills: 0 | Status: SHIPPED | OUTPATIENT
Start: 2023-06-27

## 2023-06-27 RX ORDER — LOSARTAN POTASSIUM 50 MG/1
50 TABLET ORAL DAILY
Qty: 30 TABLET | Refills: 0 | Status: SHIPPED | OUTPATIENT
Start: 2023-06-27

## 2023-06-27 RX ORDER — PANTOPRAZOLE SODIUM 40 MG/1
40 TABLET, DELAYED RELEASE ORAL DAILY
Qty: 90 TABLET | Refills: 0 | Status: SHIPPED | OUTPATIENT
Start: 2023-06-27

## 2023-07-13 DIAGNOSIS — R52 PAIN: ICD-10-CM

## 2023-07-13 DIAGNOSIS — G89.4 CHRONIC PAIN SYNDROME: ICD-10-CM

## 2023-07-13 DIAGNOSIS — I10 BENIGN ESSENTIAL HYPERTENSION: ICD-10-CM

## 2023-07-14 RX ORDER — FERROUS SULFATE 325(65) MG
1 TABLET ORAL
Qty: 30 TABLET | Refills: 0 | Status: SHIPPED | OUTPATIENT
Start: 2023-07-14

## 2023-07-14 RX ORDER — DULOXETIN HYDROCHLORIDE 60 MG/1
60 CAPSULE, DELAYED RELEASE ORAL DAILY
Qty: 90 CAPSULE | Refills: 0 | Status: SHIPPED | OUTPATIENT
Start: 2023-07-14

## 2023-07-14 RX ORDER — MULTIVITAMIN WITH FOLIC ACID 400 MCG
1 TABLET ORAL DAILY
Qty: 30 TABLET | Refills: 0 | Status: SHIPPED | OUTPATIENT
Start: 2023-07-14

## 2023-07-17 DIAGNOSIS — I50.31 ACUTE DIASTOLIC CONGESTIVE HEART FAILURE (HCC): ICD-10-CM

## 2023-07-17 DIAGNOSIS — F41.9 ANXIETY: ICD-10-CM

## 2023-07-17 RX ORDER — FUROSEMIDE 40 MG/1
40 TABLET ORAL DAILY
Qty: 30 TABLET | Refills: 0 | Status: SHIPPED | OUTPATIENT
Start: 2023-07-17

## 2023-07-17 RX ORDER — ALPRAZOLAM 0.25 MG/1
0.25 TABLET ORAL 3 TIMES DAILY PRN
Qty: 90 TABLET | Refills: 0 | Status: SHIPPED | OUTPATIENT
Start: 2023-07-17

## 2023-07-24 DIAGNOSIS — G89.4 CHRONIC PAIN SYNDROME: ICD-10-CM

## 2023-07-24 DIAGNOSIS — M54.16 LUMBAR RADICULOPATHY: ICD-10-CM

## 2023-07-24 DIAGNOSIS — M47.816 LUMBAR SPONDYLOSIS: ICD-10-CM

## 2023-07-24 RX ORDER — MORPHINE SULFATE 15 MG/1
15 TABLET, FILM COATED, EXTENDED RELEASE ORAL 2 TIMES DAILY
Qty: 56 TABLET | Refills: 0 | Status: SHIPPED | OUTPATIENT
Start: 2023-07-24 | End: 2023-08-21

## 2023-07-24 RX ORDER — OXYCODONE AND ACETAMINOPHEN 10; 325 MG/1; MG/1
1 TABLET ORAL EVERY 6 HOURS PRN
Qty: 120 TABLET | Refills: 0 | Status: SHIPPED | OUTPATIENT
Start: 2023-07-24

## 2023-07-28 ENCOUNTER — OFFICE VISIT (OUTPATIENT)
Dept: FAMILY MEDICINE CLINIC | Facility: CLINIC | Age: 68
End: 2023-07-28
Payer: COMMERCIAL

## 2023-07-28 VITALS
SYSTOLIC BLOOD PRESSURE: 138 MMHG | OXYGEN SATURATION: 94 % | BODY MASS INDEX: 17.77 KG/M2 | HEART RATE: 76 BPM | WEIGHT: 131 LBS | DIASTOLIC BLOOD PRESSURE: 86 MMHG | TEMPERATURE: 99.8 F | RESPIRATION RATE: 18 BRPM

## 2023-07-28 DIAGNOSIS — I21.4 NSTEMI (NON-ST ELEVATED MYOCARDIAL INFARCTION) (HCC): ICD-10-CM

## 2023-07-28 DIAGNOSIS — J06.9 UPPER RESPIRATORY TRACT INFECTION, UNSPECIFIED TYPE: ICD-10-CM

## 2023-07-28 DIAGNOSIS — K21.9 GASTROESOPHAGEAL REFLUX DISEASE, UNSPECIFIED WHETHER ESOPHAGITIS PRESENT: Primary | ICD-10-CM

## 2023-07-28 DIAGNOSIS — I50.31 ACUTE DIASTOLIC CONGESTIVE HEART FAILURE (HCC): ICD-10-CM

## 2023-07-28 DIAGNOSIS — F11.20 UNCOMPLICATED OPIOID DEPENDENCE (HCC): ICD-10-CM

## 2023-07-28 DIAGNOSIS — E44.0 MODERATE PROTEIN-CALORIE MALNUTRITION (HCC): ICD-10-CM

## 2023-07-28 DIAGNOSIS — M46.1 SACROILIITIS, NOT ELSEWHERE CLASSIFIED (HCC): ICD-10-CM

## 2023-07-28 DIAGNOSIS — M54.16 LUMBAR RADICULOPATHY: ICD-10-CM

## 2023-07-28 DIAGNOSIS — Z23 NEED FOR PROPHYLACTIC VACCINATION AGAINST STREPTOCOCCUS PNEUMONIAE (PNEUMOCOCCUS): ICD-10-CM

## 2023-07-28 DIAGNOSIS — J43.9 PULMONARY EMPHYSEMA, UNSPECIFIED EMPHYSEMA TYPE (HCC): ICD-10-CM

## 2023-07-28 DIAGNOSIS — I10 BENIGN ESSENTIAL HYPERTENSION: ICD-10-CM

## 2023-07-28 DIAGNOSIS — J43.9 PULMONARY EMPHYSEMA, UNSPECIFIED EMPHYSEMA TYPE (HCC): Primary | ICD-10-CM

## 2023-07-28 DIAGNOSIS — I25.10 ASCVD (ARTERIOSCLEROTIC CARDIOVASCULAR DISEASE): ICD-10-CM

## 2023-07-28 PROCEDURE — 90677 PCV20 VACCINE IM: CPT | Performed by: FAMILY MEDICINE

## 2023-07-28 PROCEDURE — 99215 OFFICE O/P EST HI 40 MIN: CPT | Performed by: FAMILY MEDICINE

## 2023-07-28 PROCEDURE — G0009 ADMIN PNEUMOCOCCAL VACCINE: HCPCS | Performed by: FAMILY MEDICINE

## 2023-07-28 PROCEDURE — 1160F RVW MEDS BY RX/DR IN RCRD: CPT | Performed by: FAMILY MEDICINE

## 2023-07-28 PROCEDURE — 1159F MED LIST DOCD IN RCRD: CPT | Performed by: FAMILY MEDICINE

## 2023-07-28 RX ORDER — ATORVASTATIN CALCIUM 80 MG/1
80 TABLET, FILM COATED ORAL DAILY
Qty: 90 TABLET | Refills: 3 | Status: SHIPPED | OUTPATIENT
Start: 2023-07-28

## 2023-07-28 NOTE — PROGRESS NOTES
Name: Shanel Grey      : 1955      MRN: [id number]  Encounter Provider: Dee Silva MD  Encounter Date: 2023   Encounter department: [address]     1. Gastroesophageal reflux disease, unspecified whether esophagitis present  Assessment & Plan:  No alarm signs. Continue current. Orders:  -     Lipid panel; Future  -     Comprehensive metabolic panel; Future; Expected date: 2023  -     CBC and differential; Future  -     TSH, 3rd generation with Free T4 reflex; Future    2. Pulmonary emphysema, unspecified emphysema type [facility])  Assessment & Plan:  Urged smoking cessation. Continue current. Orders:  -     Lipid panel; Future  -     Comprehensive metabolic panel; Future; Expected date: 2023  -     CBC and differential; Future  -     TSH, 3rd generation with Free T4 reflex; Future    3. Upper respiratory tract infection, unspecified type  -     Lipid panel; Future  -     Comprehensive metabolic panel; Future; Expected date: 2023  -     CBC and differential; Future  -     TSH, 3rd generation with Free T4 reflex; Future    4. Benign essential hypertension  Assessment & Plan:  BP at goal. Continue current. Orders:  -     Lipid panel; Future  -     Comprehensive metabolic panel; Future; Expected date: 2023  -     CBC and differential; Future  -     TSH, 3rd generation with Free T4 reflex; Future    5. ASCVD (arteriosclerotic cardiovascular disease)  Assessment & Plan:  No CP or SOB. Continue current. Orders:  -     Lipid panel; Future  -     Comprehensive metabolic panel; Future; Expected date: 2023  -     CBC and differential; Future  -     TSH, 3rd generation with Free T4 reflex; Future    6. Acute diastolic congestive heart failure [facility])  Assessment & Plan:  Wt Readings from Last 3 Encounters:   23 59.4 kg (131 lb)   23 61 kg (134 lb 6.4 oz)   23 65.7 kg (144 lb 12.8 oz)     Weight is stable. Continue current. Orders:  -     Lipid panel; Future  -     Comprehensive metabolic panel; Future; Expected date: 07/29/2023  -     CBC and differential; Future  -     TSH, 3rd generation with Free T4 reflex; Future    7. Lumbar radiculopathy  Assessment & Plan:  Stable. Continue current. Orders:  -     Lipid panel; Future  -     Comprehensive metabolic panel; Future; Expected date: 07/29/2023  -     CBC and differential; Future  -     TSH, 3rd generation with Free T4 reflex; Future    8. Sacroiliitis, not elsewhere classified [facility])  Assessment & Plan:  Stable. Continue current. Orders:  -     Lipid panel; Future  -     Comprehensive metabolic panel; Future; Expected date: 07/29/2023  -     CBC and differential; Future  -     TSH, 3rd generation with Free T4 reflex; Future    9. Uncomplicated opioid dependence ([address])  Assessment & Plan:  Continue current. Orders:  -     Lipid panel; Future  -     Comprehensive metabolic panel; Future; Expected date: 07/29/2023  -     CBC and differential; Future  -     TSH, 3rd generation with Free T4 reflex; Future    10. Moderate protein-calorie malnutrition ([address])  Assessment & Plan:  Malnutrition Findings:             Continue current. BMI Findings: Body mass index is 17.77 kg/m². Orders:  -     Lipid panel; Future  -     Comprehensive metabolic panel; Future; Expected date: 07/29/2023  -     CBC and differential; Future  -     TSH, 3rd generation with Free T4 reflex; Future    11. Need for prophylactic vaccination against Streptococcus pneumoniae (pneumococcus)  -     Pneumococcal Conjugate Vaccine 20-valent (Pcv20)  -     Lipid panel; Future  -     Comprehensive metabolic panel; Future; Expected date: 07/29/2023  -     CBC and differential; Future  -     TSH, 3rd generation with Free T4 reflex; Future         Subjective      He continues to struggle with LBP. He is not a candidate for NSAIDs.   He previously got relief for opioids, but he is requiring increasing doses. His BP is at goal on his current regimen. He has no CP or SOB. He has no HA or vision changes. He denies anginal symptoms or العلي. He continues to smoke and is not interested in quitting. Review of Systems   All other systems reviewed and are negative. Current Outpatient Medications on File Prior to Visit   Medication Sig   • albuterol (2.5 mg/3 mL) 0.083 % nebulizer solution Take 3 mL (2.5 mg total) by nebulization every 4 (four) hours as needed for wheezing or shortness of breath   • albuterol (PROVENTIL HFA,VENTOLIN HFA) 90 mcg/act inhaler Inhale 2 puffs every 6 (six) hours as needed for wheezing   • ALPRAZolam (XANAX) 0.25 mg tablet Take 1 tablet (0.25 mg total) by mouth 3 (three) times a day as needed for anxiety   • ascorbic acid (VITAMIN C) 250 mg tablet Take 500 mg by mouth daily   • aspirin (Aspirin Low Dose) 81 mg EC tablet Take 1 tablet (81 mg total) by mouth daily   • clopidogrel (PLAVIX) 75 mg tablet Take 1 tablet (75 mg total) by mouth daily   • collagenase (Santyl) ointment Apply topically daily   • Diclofenac Sodium (VOLTAREN) 1 % Apply 4 g topically 4 (four) times a day   • DULoxetine (CYMBALTA) 60 mg delayed release capsule Take 1 capsule (60 mg total) by mouth daily   • ergocalciferol (ERGOCALCIFEROL) 1.25 MG (39437 UT) capsule Take 125 mcg by mouth once a week   • ethambutol (MYAMBUTOL) 100 mg tablet Take 1,000 mg/kg by mouth daily   • ferrous sulfate 325 (65 Fe) mg tablet Take 1 tablet (325 mg total) by mouth daily before breakfast   • fluticasone-umeclidinium-vilanterol (Trelegy Ellipta) 100-62.5-25 mcg/actuation inhaler Inhale 1 puff daily Rinse mouth after use.    • furosemide (LASIX) 40 mg tablet Take 1 tablet (40 mg total) by mouth daily   • gabapentin (NEURONTIN) 300 mg capsule Take 1 capsule (300 mg total) by mouth 2 (two) times a day   • Lidocaine 4 % PTCH Place 1 patch on the skin daily   • losartan (COZAAR) 50 mg tablet Take 1 tablet (50 mg total) by mouth daily   • metoprolol succinate (TOPROL-XL) 25 mg 24 hr tablet take 1 tablet by mouth once daily   • morphine (AVINza) 30 MG 24 hr capsule Take 1 capsule (30 mg total) by mouth daily Max Daily Amount: 30 mg   • morphine (MS CONTIN) 15 mg 12 hr tablet Take 1 tablet (15 mg total) by mouth 2 (two) times a day for 28 days Max Daily Amount: 30 mg   • Multiple Vitamin (Tab-A-Douglas) TABS Take 1 tablet by mouth daily   • oxyCODONE-acetaminophen (PERCOCET)  mg per tablet Take 1 tablet by mouth every 6 (six) hours as needed for moderate pain Max Daily Amount: 4 tablets   • pantoprazole (PROTONIX) 40 mg tablet Take 1 tablet (40 mg total) by mouth daily   • Probiotic Product (Bacid) CAPS Take 1 capsule by mouth in the morning   • rifampin (RIFADIN) 300 mg capsule Take 600 mg by mouth daily   • zinc sulfate (ZINCATE) 220 mg capsule take 1 capsule by mouth once daily UNTIL 4/6/2023       Objective     /86   Pulse 76   Temp 99.8 °F (37.7 °C) (Temporal)   Resp 18   Wt 59.4 kg (131 lb)   SpO2 94%   BMI 17.77 kg/m²     Physical Exam  Vitals and nursing note reviewed. Constitutional:       Appearance: Normal appearance. Cardiovascular:      Rate and Rhythm: Normal rate and regular rhythm. Pulses: Normal pulses. Heart sounds: Normal heart sounds. Pulmonary:      Effort: Pulmonary effort is normal.      Breath sounds: Normal breath sounds. Abdominal:      General: Abdomen is flat. Bowel sounds are normal.      Palpations: Abdomen is soft. Musculoskeletal:         General: Normal range of motion. Cervical back: Normal range of motion and neck supple. Skin:     General: Skin is warm and dry. Capillary Refill: Capillary refill takes less than 2 seconds. Neurological:      General: No focal deficit present. Mental Status: He is alert and oriented to person, place, and time. Mental status is at baseline.    Psychiatric:         Mood and Affect: Mood normal. Behavior: Behavior normal.         Thought Content:  Thought content normal.         Judgment: Judgment normal.       Caro Hart MD

## 2023-07-28 NOTE — ASSESSMENT & PLAN NOTE
Malnutrition Findings:             Continue current. BMI Findings: Body mass index is 17.77 kg/m². harsh all pertinent systems normal

## 2023-07-29 DIAGNOSIS — I50.31 ACUTE DIASTOLIC CONGESTIVE HEART FAILURE (HCC): ICD-10-CM

## 2023-07-30 RX ORDER — AZITHROMYCIN 500 MG/1
500 TABLET, FILM COATED ORAL DAILY
Qty: 30 TABLET | Refills: 0 | Status: SHIPPED | OUTPATIENT
Start: 2023-07-30 | End: 2023-08-29

## 2023-07-31 RX ORDER — LOSARTAN POTASSIUM 50 MG/1
50 TABLET ORAL DAILY
Qty: 90 TABLET | Refills: 3 | Status: SHIPPED | OUTPATIENT
Start: 2023-07-31

## 2023-08-02 DIAGNOSIS — J44.9 CHRONIC OBSTRUCTIVE PULMONARY DISEASE, UNSPECIFIED COPD TYPE (HCC): ICD-10-CM

## 2023-08-02 RX ORDER — ALBUTEROL SULFATE 90 UG/1
2 AEROSOL, METERED RESPIRATORY (INHALATION) EVERY 6 HOURS PRN
Qty: 18 G | Refills: 0 | Status: SHIPPED | OUTPATIENT
Start: 2023-08-02

## 2023-08-03 DIAGNOSIS — R69 SICK: ICD-10-CM

## 2023-08-03 DIAGNOSIS — G47.00 INSOMNIA, UNSPECIFIED TYPE: ICD-10-CM

## 2023-08-03 DIAGNOSIS — D36.9 MACROADENOMA: Primary | ICD-10-CM

## 2023-08-03 RX ORDER — RIFAMPIN 300 MG/1
600 CAPSULE ORAL DAILY
Qty: 30 CAPSULE | Refills: 5 | Status: SHIPPED | OUTPATIENT
Start: 2023-08-03 | End: 2023-09-02

## 2023-08-03 RX ORDER — METOPROLOL SUCCINATE 25 MG/1
25 TABLET, EXTENDED RELEASE ORAL DAILY
Qty: 90 TABLET | Refills: 0 | Status: SHIPPED | OUTPATIENT
Start: 2023-08-03

## 2023-08-17 DIAGNOSIS — I10 BENIGN ESSENTIAL HYPERTENSION: ICD-10-CM

## 2023-08-17 DIAGNOSIS — G89.4 CHRONIC PAIN SYNDROME: ICD-10-CM

## 2023-08-17 RX ORDER — FERROUS SULFATE 325(65) MG
1 TABLET ORAL
Qty: 30 TABLET | Refills: 0 | Status: SHIPPED | OUTPATIENT
Start: 2023-08-17

## 2023-08-17 RX ORDER — MULTIVITAMIN WITH FOLIC ACID 400 MCG
1 TABLET ORAL DAILY
Qty: 30 TABLET | Refills: 0 | Status: SHIPPED | OUTPATIENT
Start: 2023-08-17

## 2023-08-17 RX ORDER — BACILLUS COAGULANS 1B CELL
1 CAPSULE ORAL DAILY
Qty: 90 CAPSULE | Refills: 0 | Status: SHIPPED | OUTPATIENT
Start: 2023-08-17

## 2023-08-21 DIAGNOSIS — G89.4 CHRONIC PAIN SYNDROME: ICD-10-CM

## 2023-08-21 DIAGNOSIS — M47.816 LUMBAR SPONDYLOSIS: ICD-10-CM

## 2023-08-21 DIAGNOSIS — M54.16 LUMBAR RADICULOPATHY: ICD-10-CM

## 2023-08-22 RX ORDER — OXYCODONE AND ACETAMINOPHEN 10; 325 MG/1; MG/1
1 TABLET ORAL EVERY 6 HOURS PRN
Qty: 120 TABLET | Refills: 0 | Status: SHIPPED | OUTPATIENT
Start: 2023-08-22

## 2023-08-22 RX ORDER — MORPHINE SULFATE 15 MG/1
15 TABLET, FILM COATED, EXTENDED RELEASE ORAL 2 TIMES DAILY
Qty: 56 TABLET | Refills: 0 | Status: SHIPPED | OUTPATIENT
Start: 2023-08-22 | End: 2023-09-19

## 2023-08-24 DIAGNOSIS — F41.9 ANXIETY: ICD-10-CM

## 2023-08-24 DIAGNOSIS — I50.31 ACUTE DIASTOLIC CONGESTIVE HEART FAILURE (HCC): ICD-10-CM

## 2023-08-27 RX ORDER — FUROSEMIDE 40 MG/1
40 TABLET ORAL DAILY
Qty: 30 TABLET | Refills: 0 | Status: SHIPPED | OUTPATIENT
Start: 2023-08-27

## 2023-08-27 RX ORDER — ALPRAZOLAM 0.25 MG/1
0.25 TABLET ORAL 3 TIMES DAILY PRN
Qty: 90 TABLET | Refills: 0 | Status: SHIPPED | OUTPATIENT
Start: 2023-08-27

## 2023-08-30 DIAGNOSIS — R69 SICK: ICD-10-CM

## 2023-08-30 DIAGNOSIS — J43.9 PULMONARY EMPHYSEMA, UNSPECIFIED EMPHYSEMA TYPE (HCC): ICD-10-CM

## 2023-08-31 RX ORDER — RIFAMPIN 300 MG/1
600 CAPSULE ORAL DAILY
Qty: 30 CAPSULE | Refills: 5 | Status: CANCELLED | OUTPATIENT
Start: 2023-08-31 | End: 2023-09-30

## 2023-08-31 RX ORDER — ETHAMBUTOL HYDROCHLORIDE 100 MG/1
1000 TABLET, FILM COATED ORAL DAILY
Status: CANCELLED | OUTPATIENT
Start: 2023-08-31

## 2023-09-03 DIAGNOSIS — J44.9 CHRONIC OBSTRUCTIVE PULMONARY DISEASE, UNSPECIFIED COPD TYPE (HCC): ICD-10-CM

## 2023-09-05 RX ORDER — ALBUTEROL SULFATE 90 UG/1
2 AEROSOL, METERED RESPIRATORY (INHALATION) EVERY 6 HOURS PRN
Qty: 18 G | Refills: 5 | Status: SHIPPED | OUTPATIENT
Start: 2023-09-05

## 2023-09-08 ENCOUNTER — TELEPHONE (OUTPATIENT)
Dept: FAMILY MEDICINE CLINIC | Facility: CLINIC | Age: 68
End: 2023-09-08

## 2023-09-08 NOTE — TELEPHONE ENCOUNTER
Patient asking for refill on azithromycin 500mg. I did not think he needed this anymore but wanted to double check with you?

## 2023-09-11 ENCOUNTER — TELEPHONE (OUTPATIENT)
Dept: FAMILY MEDICINE CLINIC | Facility: CLINIC | Age: 68
End: 2023-09-11

## 2023-09-11 DIAGNOSIS — A31.0 MAI (MYCOBACTERIUM AVIUM-INTRACELLULARE) (HCC): Primary | ICD-10-CM

## 2023-09-11 DIAGNOSIS — R69 SICK: ICD-10-CM

## 2023-09-11 RX ORDER — AZITHROMYCIN 500 MG/1
500 TABLET, FILM COATED ORAL DAILY
Qty: 30 TABLET | Refills: 5 | Status: SHIPPED | OUTPATIENT
Start: 2023-09-11 | End: 2023-10-11

## 2023-09-11 RX ORDER — RIFAMPIN 300 MG/1
600 CAPSULE ORAL DAILY
Qty: 30 CAPSULE | Refills: 5 | Status: SHIPPED | OUTPATIENT
Start: 2023-09-11 | End: 2023-10-11

## 2023-09-11 NOTE — TELEPHONE ENCOUNTER
Pt calling stating his daughter advised that he needs the azithromycin 500mg refilled-she stated that he needs to be on it for at least a year    Updated chart with care everywhere    It looks like this was started by Infectious Disease

## 2023-09-12 RX ORDER — AZITHROMYCIN 500 MG/1
500 TABLET, FILM COATED ORAL DAILY
Qty: 30 TABLET | Refills: 0 | OUTPATIENT
Start: 2023-09-12 | End: 2023-10-12

## 2023-09-18 DIAGNOSIS — M47.816 LUMBAR SPONDYLOSIS: ICD-10-CM

## 2023-09-18 DIAGNOSIS — G89.4 CHRONIC PAIN SYNDROME: ICD-10-CM

## 2023-09-18 DIAGNOSIS — M54.16 LUMBAR RADICULOPATHY: ICD-10-CM

## 2023-09-18 RX ORDER — MORPHINE SULFATE 15 MG/1
15 TABLET, FILM COATED, EXTENDED RELEASE ORAL 2 TIMES DAILY
Qty: 56 TABLET | Refills: 0 | Status: SHIPPED | OUTPATIENT
Start: 2023-09-18 | End: 2023-10-16

## 2023-09-18 RX ORDER — OXYCODONE AND ACETAMINOPHEN 10; 325 MG/1; MG/1
1 TABLET ORAL EVERY 6 HOURS PRN
Qty: 120 TABLET | Refills: 0 | Status: SHIPPED | OUTPATIENT
Start: 2023-09-18

## 2023-09-22 DIAGNOSIS — G89.4 CHRONIC PAIN SYNDROME: ICD-10-CM

## 2023-09-22 DIAGNOSIS — I10 BENIGN ESSENTIAL HYPERTENSION: ICD-10-CM

## 2023-09-22 DIAGNOSIS — F41.9 ANXIETY: ICD-10-CM

## 2023-09-22 RX ORDER — ALPRAZOLAM 0.25 MG/1
0.25 TABLET ORAL 3 TIMES DAILY PRN
Qty: 90 TABLET | Refills: 0 | Status: SHIPPED | OUTPATIENT
Start: 2023-09-22

## 2023-09-22 RX ORDER — MULTIVITAMIN WITH FOLIC ACID 400 MCG
1 TABLET ORAL DAILY
Qty: 30 TABLET | Refills: 0 | Status: SHIPPED | OUTPATIENT
Start: 2023-09-22

## 2023-09-22 RX ORDER — FERROUS SULFATE 325(65) MG
1 TABLET ORAL
Qty: 30 TABLET | Refills: 0 | Status: SHIPPED | OUTPATIENT
Start: 2023-09-22

## 2023-09-29 DIAGNOSIS — I50.31 ACUTE DIASTOLIC CONGESTIVE HEART FAILURE (HCC): ICD-10-CM

## 2023-09-29 RX ORDER — FUROSEMIDE 40 MG/1
40 TABLET ORAL DAILY
Qty: 30 TABLET | Refills: 5 | Status: SHIPPED | OUTPATIENT
Start: 2023-09-29

## 2023-10-11 DIAGNOSIS — G47.00 INSOMNIA, UNSPECIFIED TYPE: ICD-10-CM

## 2023-10-11 RX ORDER — ASPIRIN 81 MG/1
81 TABLET ORAL DAILY
Qty: 90 TABLET | Refills: 0 | Status: SHIPPED | OUTPATIENT
Start: 2023-10-11

## 2023-10-11 RX ORDER — CLOPIDOGREL BISULFATE 75 MG/1
75 TABLET ORAL DAILY
Qty: 90 TABLET | Refills: 0 | Status: SHIPPED | OUTPATIENT
Start: 2023-10-11

## 2023-10-16 DIAGNOSIS — M47.816 LUMBAR SPONDYLOSIS: ICD-10-CM

## 2023-10-16 DIAGNOSIS — G89.4 CHRONIC PAIN SYNDROME: ICD-10-CM

## 2023-10-16 DIAGNOSIS — M54.16 LUMBAR RADICULOPATHY: ICD-10-CM

## 2023-10-17 RX ORDER — OXYCODONE AND ACETAMINOPHEN 10; 325 MG/1; MG/1
1 TABLET ORAL EVERY 6 HOURS PRN
Qty: 120 TABLET | Refills: 0 | Status: SHIPPED | OUTPATIENT
Start: 2023-10-17

## 2023-10-17 RX ORDER — MORPHINE SULFATE 15 MG/1
15 TABLET, FILM COATED, EXTENDED RELEASE ORAL 2 TIMES DAILY
Qty: 56 TABLET | Refills: 0 | Status: SHIPPED | OUTPATIENT
Start: 2023-10-17 | End: 2023-11-14

## 2023-10-23 DIAGNOSIS — I10 BENIGN ESSENTIAL HYPERTENSION: ICD-10-CM

## 2023-10-23 DIAGNOSIS — F41.9 ANXIETY: ICD-10-CM

## 2023-10-23 DIAGNOSIS — G89.4 CHRONIC PAIN SYNDROME: ICD-10-CM

## 2023-10-23 RX ORDER — ALPRAZOLAM 0.25 MG/1
0.25 TABLET ORAL 3 TIMES DAILY PRN
Qty: 90 TABLET | Refills: 0 | Status: SHIPPED | OUTPATIENT
Start: 2023-10-23

## 2023-10-23 RX ORDER — FERROUS SULFATE 325(65) MG
1 TABLET ORAL
Qty: 30 TABLET | Refills: 0 | Status: SHIPPED | OUTPATIENT
Start: 2023-10-23

## 2023-10-23 RX ORDER — MULTIVITAMIN WITH FOLIC ACID 400 MCG
1 TABLET ORAL DAILY
Qty: 30 TABLET | Refills: 0 | Status: SHIPPED | OUTPATIENT
Start: 2023-10-23

## 2023-10-24 NOTE — PROGRESS NOTES
Assessment    1  Screening for ischemic heart disease (V81 0) (Z13 6)   2  Screening for diabetes mellitus (DM) (V77 1) (Z13 1)   3  Weight loss, non-intentional (783 21) (R63 4)   4  Chronic obstructive pulmonary disease (496) (J44 9)   5  Insomnia (780 52) (G47 00)   6  Anxiety (300 00) (F41 9)   7  Back pain, chronic (724 5,338 29) (M54 9,G89 29)   8  Chronic low back pain (724 2,338 29) (M54 5,G89 29)    Plan  Insomnia    · Zolpidem Tartrate 5 MG Oral Tablet; TAKE 1 TABLET AT BEDTIME AS NEEDED  Left knee pain    · Diclofenac Sodium 1 % Transdermal Gel; APPLY 2 GRAMS AFFECTED AREA-SEVERY 6 HOURS AS NEEDED FOR PAIN -EXTERNAL USE ONLY  Other chronic pain    · Acetaminophen-Codeine #4 300-60 MG Oral Tablet; TAKE 1 TABLET EVERY 6TO 8 HOURS AS NEEDED FOR PAIN   · Oxycodone-Acetaminophen 5-325 MG Oral Tablet; TAKE 1 TABLET EVERY 6HOURS AS NEEDED  Screening for diabetes mellitus (DM)    · (1) COMPREHENSIVE METABOLIC PANEL; Status:Active; Requested for:17Oct2017;   Screening for ischemic heart disease    · (1) LIPID PANEL, FASTING; Status:Active; Requested DAY:56ZHL4014;   Weight loss, non-intentional    · (1) CBC/PLT/DIFF; Status:Active; Requested for:17Oct2017;    · (1) CELIAC DISEASE AB PROFILE; Status:Active; Requested for:17Oct2017;    · (1) TSH WITH FT4 REFLEX; Status:Active; Requested for:17Oct2017;     Discussion/Summary  Possible side effects of new medications were reviewed with the patient/guardian today  The treatment plan was reviewed with the patient/guardian  The patient/guardian understands and agrees with the treatment plan      Chief Complaint  FOLLOW UP   Patient is here today for follow up of chronic conditions described in HPI  History of Present Illness  He has COPD  He did well on Stiolto, but his insurance did not cover it  He has no sputum production or cough  He does not wheeze  He has a COPD action plan in the chart   has chronic anxiety  He is on Cymbalta and Xanax  He has no HI/SI    has chronic pain  He takes Tylenol #E#3 for moderate pain and Percocet for severe pain  He has a treatment agreement in the chart and drug screens consistent with correct usage of medication  continues to lose weight  He states he had a few weeks of non-bloody diarrhea  He has no abd pain  Review of Systems   Constitutional: No fever or chills, feels well, no tiredness, no recent weight gain or weight loss  Eyes: No complaints of eye pain, no red eyes, no discharge from eyes, no itchy eyes  ENT: no complaints of earache, no hearing loss, no nosebleeds, no nasal discharge, no sore throat, no hoarseness  Cardiovascular: No complaints of slow heart rate, no fast heart rate, no chest pain, no palpitations, no leg claudication, no lower extremity  Respiratory: No complaints of shortness of breath, no wheezing, no cough, no SOB on exertion, no orthopnea or PND  Gastrointestinal: No complaints of abdominal pain, no constipation, no nausea or vomiting, no diarrhea or bloody stools  Genitourinary: No complaints of dysuria, no incontinence, no hesitancy, no nocturia, no genital lesion, no testicular pain  Musculoskeletal: as noted in HPI  Integumentary: No complaints of skin rash or skin lesions, no itching, no skin wound, no dry skin  Neurological: No compliants of headache, no confusion, no convulsions, no numbness or tingling, no dizziness or fainting, no limb weakness, no difficulty walking  Psychiatric: Is not suicidal, no sleep disturbances, no anxiety or depression, no change in personality, no emotional problems  Endocrine: No complaints of proptosis, no hot flashes, no muscle weakness, no erectile dysfunction, no deepening of the voice, no feelings of weakness  Hematologic/Lymphatic: No complaints of swollen glands, no swollen glands in the neck, does not bleed easily, no easy bruising  Active Problems  1  Anxiety (300 00) (F41 9)   2  Back pain, chronic (724 5,338 29) (M54 9,G89 29)   3   Chronic low back pain (724 2,338 29) (M54 5,G89 29)   4  Chronic obstructive pulmonary disease (496) (J44 9)   5  Current tobacco use (305 1) (Z72 0)   6  Depression screen (V79 0) (Z13 89)   7  Depression with anxiety (300 4) (F41 8)   8  Early satiety (780 94) (R68 81)   9  Encounter for screening for malignant neoplasm of colon (V76 51) (Z12 11)   10  Fatigue (780 79) (R53 83)   11  GERD (gastroesophageal reflux disease) (530 81) (K21 9)   12  Headache (784 0) (R51)   13  Incisional hernia (553 21) (K43 2)   14  Insomnia (780 52) (G47 00)   15  Left knee pain (719 46) (M25 562)   16  Neck pain (723 1) (M54 2)   17  Other chest pain (786 59) (R07 89)   18  Other chronic pain (338 29) (G89 29)   19  Pain in joint of left shoulder (719 41) (M25 512)   20  Pain of left hip (719 45) (M25 552)   21  Screening for diabetes mellitus (DM) (V77 1) (Z13 1)   22  Screening for ischemic heart disease (V81 0) (Z13 6)   23  Special screening examination for neoplasm of prostate (V76 44) (Z12 5)   24  Weight loss, non-intentional (783 21) (R63 4)    Past Medical History  1  History of hyperthyroidism (V12 29) (Z86 39)   2  History of upper respiratory infection (V12 09) (Z87 09)    Surgical History  1  History of Inguinal Hernia Repair    Family History  Mother    1  Family history of cardiac disorder (V17 49) (Z82 49)   2  Family history unobtainable (V49 89) (Z78 9)  Father    3  Family history of cardiac disorder (V17 49) (Z82 49)   4  Family history unobtainable (V49 89) (Z78 9)  Maternal Grandmother    5  Family history of cardiac disorder (V17 49) (Z82 49)  Paternal Grandmother    10  Family history of cardiac disorder (V17 49) (Z82 49)  Maternal Grandfather    7  Family history of cardiac disorder (V17 49) (Z82 49)  Paternal Grandfather    8   Family history of cardiac disorder (V17 49) (Z82 49)    Social History     · Denied: History of Alcohol Use (History)   · Denied: History of Drug Use   · Former smoker (V15 82) (Q65 611)    Current Meds   1  Acetaminophen-Codeine #E#4 300-60 MG Oral Tablet; TAKE 1 TABLET EVERY 6 TO 8 HOURS AS NEEDED FOR PAIN; Therapy: 79Lbo6775 to (Evaluate:02Oct2017); Last Rx:99Byx1495 Ordered   2  Albuterol Sulfate (2 5 MG/3ML) 0 083% Inhalation Nebulization Solution; USE 1 UNIT DOSE EVERY 4-6 HOURS AS NEEDED FOR WHEEZING ; Therapy: 57QCU0623 to (Last Rx:21Nov2014)  Requested for: 21Nov2014 Ordered   3  ALPRAZolam 0 25 MG Oral Tablet; TAKE 1 TABLET EVERY 12 HOURS AS NEEDED; Last Rx:29Ghu9203 Ordered   4  Aspirin 81 MG TABS; Therapy: (Recorded:14Nov2012) to Recorded   5  Capsaicin 0 025 % External Cream; APPLY GENTLY TO AFFECTED AREA 3-4 TIMES DAILY; Therapy: 27Apr2017 to (Last Rx:16Ydh0971)  Requested for: 32Upf0548 Ordered   6  Combivent Respimat  MCG/ACT Inhalation Aerosol Solution; ONE INHALATION 4 TIMES DAILY AS NEEDED (MAX OF 6 INHALATIONS PER 24 HOURS); Therapy: 80OSK4093 to (Last Rx:25Jul2017)  Requested for: 81Mvy8072 Ordered   7  Cyclobenzaprine HCl - 10 MG Oral Tablet; Take 1 three times daily as needed; Therapy: 59QLN0658 to (Last Rx:25Jul2017)  Requested for: 31Nee8127 Ordered   8  Doxycycline Hyclate 100 MG Oral Capsule; TAKE 1 CAPSULE EVERY 12 HOURS DAILY; Therapy: 90OIW4458 to (Evaluate:03Feb2017)  Requested for: 62CQB7244; Last Rx:27Jan2017 Ordered   9  DULoxetine HCl - 60 MG Oral Capsule Delayed Release Particles; TAKE 1 CAPSULE BY MOUTH EVERY DAY; Therapy: 73Cgd0977 to (Evaluate:22Nov2017)  Requested for: 22BBL4558; Last Rx:14Kgn2238 Ordered   10  Ipratropium-Albuterol 0 5-2 5 (3) MG/3ML Inhalation Solution; USE 1 UNIT DOSE IN  NEBULIZER EVERY 4 HOURS AS NEEDED; Therapy: 11DTB4538 to (Last Rx:08Snn0150)  Requested for: 85Vay1453 Ordered   11  Oxycodone-Acetaminophen 5-325 MG Oral Tablet; TAKE 1 TABLET EVERY 6 HOURS AS  NEEDED; Therapy: 60HIH5365 to (Evaluate:74Hez3246); Last Rx:86Zjn9389 Ordered   12   Pantoprazole Sodium 40 MG Oral Tablet Delayed Release; TAKE 1 TABLET DAILY; Therapy: 00SPH6423 to (21 )  Requested for: 27Apr2017; Last  Rx:46Ael8436 Ordered   13  PredniSONE 10 MG Oral Tablet; 4 tablets daily for 3 days, then  3 tablets daily for 3 days, then  2 tablets daily for 3 days, then  1 tablet daily for 3 days; Therapy: 23UFF8951 to (Last XQ:10FOZ9710)  Requested for: 88YNH4263 Ordered   14  Stiolto Respimat 2 5-2 5 MCG/ACT Inhalation Aerosol Solution; USE 1 PUFF DAILY; Therapy: 22LCM0028 to (Last Baby Peals)  Requested for: 27Apr2017 Ordered   15  Zolpidem Tartrate 5 MG Oral Tablet; TAKE 1 TABLET AT BEDTIME AS NEEDED; Therapy: 16ERC8540 to (Evaluate:56Ilw9284); Last Rx:93Iqw8879 Ordered    Allergies  1  No Known Drug Allergies    Vitals  Vital Signs    Recorded: 94HJK2204 09:06AM   Heart Rate 77   Respiration 15   Systolic 276   Diastolic 84   Height 6 ft    Weight 169 lb 8 oz   BMI Calculated 22 99   BSA Calculated 1 99   O2 Saturation 98       Physical Exam   Constitutional  General appearance: No acute distress, well appearing and well nourished  Pulmonary  Respiratory effort: No increased work of breathing or signs of respiratory distress  Auscultation of lungs: Clear to auscultation, equal breath sounds bilaterally, no wheezes, no rales, no rhonci  Cardiovascular  Auscultation of heart: Normal rate and rhythm, normal S1 and S2, without murmurs  Examination of extremities for edema and/or varicosities: Normal    Abdomen  Abdomen: Non-tender, no masses  Liver and spleen: No hepatomegaly or splenomegaly  Health Management  Chronic obstructive pulmonary disease   Peak Flow; every 1 year; Next Due: 53BYU0541;  Overdue    Signatures   Electronically signed by : Yumiko Deras MD; Oct 17 2017  9:32AM EST                       (Author) Alternatives Discussed Intro (Do Not Add Period): I discussed alternative treatments to Mohs surgery and specifically discussed the risks and benefits of

## 2023-11-08 DIAGNOSIS — R52 PAIN: ICD-10-CM

## 2023-11-08 DIAGNOSIS — I10 BENIGN ESSENTIAL HYPERTENSION: ICD-10-CM

## 2023-11-08 RX ORDER — DULOXETIN HYDROCHLORIDE 60 MG/1
60 CAPSULE, DELAYED RELEASE ORAL DAILY
Qty: 90 CAPSULE | Refills: 0 | Status: SHIPPED | OUTPATIENT
Start: 2023-11-08

## 2023-11-08 RX ORDER — ALBUTEROL SULFATE 2.5 MG/3ML
2.5 SOLUTION RESPIRATORY (INHALATION) EVERY 4 HOURS PRN
Qty: 300 ML | Refills: 0 | Status: SHIPPED | OUTPATIENT
Start: 2023-11-08

## 2023-11-13 ENCOUNTER — TELEPHONE (OUTPATIENT)
Dept: FAMILY MEDICINE CLINIC | Facility: CLINIC | Age: 68
End: 2023-11-13

## 2023-11-13 DIAGNOSIS — M54.16 LUMBAR RADICULOPATHY: ICD-10-CM

## 2023-11-13 DIAGNOSIS — G89.4 CHRONIC PAIN SYNDROME: ICD-10-CM

## 2023-11-13 DIAGNOSIS — M47.816 LUMBAR SPONDYLOSIS: ICD-10-CM

## 2023-11-13 RX ORDER — ETHAMBUTOL HYDROCHLORIDE 100 MG/1
15 TABLET, FILM COATED ORAL DAILY
Qty: 30 TABLET | Refills: 11 | Status: SHIPPED | OUTPATIENT
Start: 2023-11-13 | End: 2023-11-13

## 2023-11-13 RX ORDER — ETHAMBUTOL HYDROCHLORIDE 100 MG/1
TABLET, FILM COATED ORAL
Qty: 30 TABLET | Refills: 11 | Status: SHIPPED | OUTPATIENT
Start: 2023-11-13 | End: 2023-12-13

## 2023-11-13 RX ORDER — MORPHINE SULFATE 15 MG/1
15 TABLET, FILM COATED, EXTENDED RELEASE ORAL 2 TIMES DAILY
Qty: 56 TABLET | Refills: 0 | Status: SHIPPED | OUTPATIENT
Start: 2023-11-13 | End: 2023-12-11

## 2023-11-13 RX ORDER — OXYCODONE AND ACETAMINOPHEN 10; 325 MG/1; MG/1
1 TABLET ORAL EVERY 6 HOURS PRN
Qty: 120 TABLET | Refills: 0 | Status: SHIPPED | OUTPATIENT
Start: 2023-11-13

## 2023-11-22 DIAGNOSIS — F41.9 ANXIETY: ICD-10-CM

## 2023-11-22 RX ORDER — ALPRAZOLAM 0.25 MG/1
0.25 TABLET ORAL 3 TIMES DAILY PRN
Qty: 90 TABLET | Refills: 0 | Status: SHIPPED | OUTPATIENT
Start: 2023-11-22

## 2023-11-24 ENCOUNTER — OFFICE VISIT (OUTPATIENT)
Dept: FAMILY MEDICINE CLINIC | Facility: CLINIC | Age: 68
End: 2023-11-24
Payer: COMMERCIAL

## 2023-11-24 VITALS
WEIGHT: 126 LBS | BODY MASS INDEX: 17.07 KG/M2 | TEMPERATURE: 98 F | OXYGEN SATURATION: 96 % | RESPIRATION RATE: 15 BRPM | SYSTOLIC BLOOD PRESSURE: 108 MMHG | HEIGHT: 72 IN | DIASTOLIC BLOOD PRESSURE: 84 MMHG | HEART RATE: 56 BPM

## 2023-11-24 DIAGNOSIS — J43.9 PULMONARY EMPHYSEMA, UNSPECIFIED EMPHYSEMA TYPE (HCC): ICD-10-CM

## 2023-11-24 DIAGNOSIS — I10 BENIGN ESSENTIAL HYPERTENSION: ICD-10-CM

## 2023-11-24 DIAGNOSIS — M46.1 SACROILIITIS, NOT ELSEWHERE CLASSIFIED (HCC): ICD-10-CM

## 2023-11-24 DIAGNOSIS — K21.9 GASTROESOPHAGEAL REFLUX DISEASE, UNSPECIFIED WHETHER ESOPHAGITIS PRESENT: ICD-10-CM

## 2023-11-24 DIAGNOSIS — G47.00 INSOMNIA, UNSPECIFIED TYPE: ICD-10-CM

## 2023-11-24 DIAGNOSIS — M54.16 LUMBAR RADICULOPATHY: ICD-10-CM

## 2023-11-24 DIAGNOSIS — E44.0 MODERATE PROTEIN-CALORIE MALNUTRITION (HCC): ICD-10-CM

## 2023-11-24 DIAGNOSIS — I50.31 ACUTE DIASTOLIC CONGESTIVE HEART FAILURE (HCC): ICD-10-CM

## 2023-11-24 DIAGNOSIS — F11.20 UNCOMPLICATED OPIOID DEPENDENCE (HCC): ICD-10-CM

## 2023-11-24 DIAGNOSIS — G89.4 CHRONIC PAIN SYNDROME: Primary | ICD-10-CM

## 2023-11-24 PROCEDURE — 99212 OFFICE O/P EST SF 10 MIN: CPT

## 2023-11-24 RX ORDER — METOPROLOL SUCCINATE 25 MG/1
25 TABLET, EXTENDED RELEASE ORAL DAILY
Qty: 90 TABLET | Refills: 0 | Status: SHIPPED | OUTPATIENT
Start: 2023-11-24

## 2023-11-24 RX ORDER — AZITHROMYCIN 500 MG/1
500 TABLET, FILM COATED ORAL DAILY
COMMUNITY
Start: 2023-11-13

## 2023-11-24 RX ORDER — BACILLUS COAGULANS 1B CELL
1 CAPSULE ORAL DAILY
Qty: 90 CAPSULE | Refills: 0 | Status: SHIPPED | OUTPATIENT
Start: 2023-11-24

## 2023-11-24 RX ORDER — MULTIVITAMIN WITH FOLIC ACID 400 MCG
1 TABLET ORAL DAILY
Qty: 30 TABLET | Refills: 0 | Status: SHIPPED | OUTPATIENT
Start: 2023-11-24

## 2023-11-24 RX ORDER — FERROUS SULFATE 325(65) MG
1 TABLET ORAL
Qty: 30 TABLET | Refills: 0 | Status: SHIPPED | OUTPATIENT
Start: 2023-11-24 | End: 2023-11-29 | Stop reason: SDUPTHER

## 2023-11-24 NOTE — PROGRESS NOTES
Name: Josiah Correa      : 1955      MRN: [id number]  Encounter Provider: Emerson Zavala PA-C  Encounter Date: 2023   Encounter department: [address]     1. Chronic pain syndrome  Assessment & Plan:  Patient is stable on current Percocet dosage. Patient is not a candidate for NSAIDs. Follow-up as scheduled. 2. Benign essential hypertension  Assessment & Plan:  Stable. Follow-up as scheduled. 3. Insomnia, unspecified type  Assessment & Plan:  Stable. Follow-up as scheduled. 4. Gastroesophageal reflux disease, unspecified whether esophagitis present  Assessment & Plan:  Able. Follow-up as scheduled. 5. Pulmonary emphysema, unspecified emphysema type ([address])  Comments:  Stable. Follow-up as scheduled. Continue follow-up with pulmonology. 6. Acute diastolic congestive heart failure [facility])  Assessment & Plan:  Wt Readings from Last 3 Encounters:   23 57.2 kg (126 lb)   23 59.4 kg (131 lb)   23 61 kg (134 lb 6.4 oz)     Stable. Follow-up as scheduled. 7. Lumbar radiculopathy  Assessment & Plan:  Patient stable on current Percocet dosage. Follow-up as scheduled. 8. Sacroiliitis, not elsewhere classified [facility])  Assessment & Plan:  Patient stable on current Percocet dosage. Follow-up as scheduled. 9. Uncomplicated opioid dependence ([address])  Assessment & Plan:  New med agreement signed. Patient stable on current medications. 10. Moderate protein-calorie malnutrition ([address])  Assessment & Plan:  Patient is counseled on having a low BMI. Patient is educated on having 3 well-balanced meals daily consisting of carbohydrates, proteins, fats. BMI Findings: Body mass index is 17.09 kg/m². BMI Counseling: Body mass index is 17.09 kg/m². The BMI is below normal. Patient advised to gain weight and dietary education for weight gain was provided. Rationale for BMI follow-up plan is due to patient being underweight. Falls Plan of Care: balance, strength, and gait training instructions were provided. Tobacco Cessation Counseling: Tobacco cessation counseling was provided. The patient is sincerely urged to quit consumption of tobacco. He is not ready to quit tobacco. Patient refused medication. Subjective      Patient is a 67-year-old male presenting for follow-up and medication check. Patient has no concerns today. Patient has chronic pain syndrome and anxiety. Patient feels as though he is being well-managed on his current medications. Review of Systems   Constitutional:  Negative for appetite change, chills, diaphoresis, fatigue and fever. HENT:  Negative for congestion, ear discharge, ear pain, rhinorrhea, sinus pressure, sinus pain, sneezing and sore throat. Eyes:  Negative for pain, discharge, redness, itching and visual disturbance. Respiratory:  Negative for apnea, cough, chest tightness and shortness of breath. Cardiovascular:  Negative for chest pain, palpitations and leg swelling. Gastrointestinal:  Negative for abdominal pain, blood in stool, constipation, diarrhea, nausea and vomiting. Endocrine: Negative for cold intolerance, heat intolerance, polydipsia and polyuria. Genitourinary:  Negative for dysuria, flank pain, frequency, hematuria, scrotal swelling, testicular pain and urgency. Musculoskeletal:  Positive for back pain and myalgias. Negative for arthralgias, joint swelling, neck pain and neck stiffness. Skin:  Negative for rash. Allergic/Immunologic: Negative. Neurological:  Negative for dizziness, tremors, seizures, syncope, weakness, light-headedness, numbness and headaches. Hematological:  Negative for adenopathy. Does not bruise/bleed easily. Psychiatric/Behavioral:  Negative for agitation, confusion, decreased concentration, dysphoric mood, hallucinations and sleep disturbance.  The patient is not nervous/anxious and is not hyperactive. Current Outpatient Medications on File Prior to Visit   Medication Sig    albuterol (2.5 mg/3 mL) 0.083 % nebulizer solution Take 3 mL (2.5 mg total) by nebulization every 4 (four) hours as needed for wheezing or shortness of breath    albuterol (PROVENTIL HFA,VENTOLIN HFA) 90 mcg/act inhaler Inhale 2 puffs every 6 (six) hours as needed for wheezing    ALPRAZolam (XANAX) 0.25 mg tablet Take 1 tablet (0.25 mg total) by mouth 3 (three) times a day as needed for anxiety    ascorbic acid (VITAMIN C) 250 mg tablet Take 500 mg by mouth daily    aspirin (Aspirin Low Dose) 81 mg EC tablet Take 1 tablet (81 mg total) by mouth daily    atorvastatin (LIPITOR) 80 mg tablet take 1 tablet by mouth once daily    azithromycin (ZITHROMAX) 500 MG tablet Take 500 mg by mouth daily    clopidogrel (PLAVIX) 75 mg tablet Take 1 tablet (75 mg total) by mouth daily    collagenase (Santyl) ointment Apply topically daily    Diclofenac Sodium (VOLTAREN) 1 % Apply 4 g topically 4 (four) times a day    DULoxetine (CYMBALTA) 60 mg delayed release capsule Take 1 capsule (60 mg total) by mouth daily    ergocalciferol (ERGOCALCIFEROL) 1.25 MG (96825 UT) capsule Take 125 mcg by mouth once a week    ethambutol (MYAMBUTOL) 100 mg tablet TAKE 12 TABLETS BY MOUTH ONCE DAILY    ferrous sulfate 325 (65 Fe) mg tablet Take 1 tablet (325 mg total) by mouth daily before breakfast    fluticasone-umeclidinium-vilanterol (Trelegy Ellipta) 100-62.5-25 mcg/actuation inhaler Inhale 1 puff daily Rinse mouth after use.     furosemide (LASIX) 40 mg tablet Take 1 tablet (40 mg total) by mouth daily    gabapentin (NEURONTIN) 300 mg capsule Take 1 capsule (300 mg total) by mouth 2 (two) times a day    losartan (COZAAR) 50 mg tablet take 1 tablet by mouth once daily    metoprolol succinate (TOPROL-XL) 25 mg 24 hr tablet Take 1 tablet (25 mg total) by mouth daily    morphine (MS CONTIN) 15 mg 12 hr tablet Take 1 tablet (15 mg total) by mouth 2 (two) times a day for 28 days Max Daily Amount: 30 mg    Multiple Vitamin (Tab-A-Douglas) TABS Take 1 tablet by mouth daily    oxyCODONE-acetaminophen (PERCOCET)  mg per tablet Take 1 tablet by mouth every 6 (six) hours as needed for moderate pain Max Daily Amount: 4 tablets    pantoprazole (PROTONIX) 40 mg tablet Take 1 tablet (40 mg total) by mouth daily    Probiotic Product (Bacid) CAPS Take 1 capsule by mouth in the morning    rifampin (RIFADIN) 300 mg capsule Take 2 capsules (600 mg total) by mouth daily    zinc sulfate (ZINCATE) 220 mg capsule take 1 capsule by mouth once daily UNTIL 4/6/2023    Lidocaine 4 % PTCH Place 1 patch on the skin daily (Patient not taking: Reported on 11/24/2023)    morphine (AVINza) 30 MG 24 hr capsule Take 1 capsule (30 mg total) by mouth daily Max Daily Amount: 30 mg (Patient not taking: Reported on 11/24/2023)       Objective     /84 (BP Location: Left arm, Patient Position: Sitting)   Pulse 56   Temp 98 °F (36.7 °C) (Tympanic)   Resp 15   Ht 6' (1.829 m)   Wt 57.2 kg (126 lb)   SpO2 96%   BMI 17.09 kg/m²     Physical Exam  Constitutional:       Appearance: Normal appearance. He is normal weight. He is not ill-appearing or toxic-appearing. HENT:      Head: Normocephalic and atraumatic. Right Ear: Tympanic membrane normal.      Left Ear: Tympanic membrane normal.      Nose: Nose normal. No congestion. Mouth/Throat:      Mouth: Mucous membranes are moist.      Pharynx: Oropharynx is clear. No oropharyngeal exudate or posterior oropharyngeal erythema. Eyes:      Extraocular Movements: Extraocular movements intact. Conjunctiva/sclera: Conjunctivae normal.      Pupils: Pupils are equal, round, and reactive to light. Cardiovascular:      Rate and Rhythm: Normal rate and regular rhythm. Pulses: Normal pulses. Heart sounds: Normal heart sounds.    Pulmonary:      Effort: Pulmonary effort is normal. No respiratory distress. Breath sounds: Normal breath sounds. No wheezing. Abdominal:      General: Bowel sounds are normal.      Palpations: Abdomen is soft. Tenderness: There is no abdominal tenderness. Musculoskeletal:         General: Normal range of motion. Cervical back: Normal range of motion and neck supple. No tenderness. Right lower leg: No edema. Left lower leg: No edema. Lymphadenopathy:      Cervical: No cervical adenopathy. Skin:     General: Skin is warm. Capillary Refill: Capillary refill takes less than 2 seconds. Findings: No erythema or rash. Neurological:      General: No focal deficit present. Mental Status: He is alert and oriented to person, place, and time. Mental status is at baseline. Motor: No weakness. Psychiatric:         Mood and Affect: Mood normal.         Behavior: Behavior normal.         Thought Content:  Thought content normal.         Judgment: Judgment normal.       Cleve Magallanes PA-C

## 2023-11-24 NOTE — ASSESSMENT & PLAN NOTE
Patient is counseled on having a low BMI. Patient is educated on having 3 well-balanced meals daily consisting of carbohydrates, proteins, fats. BMI Findings: Body mass index is 17.09 kg/m².

## 2023-11-24 NOTE — ASSESSMENT & PLAN NOTE
Patient is stable on current Percocet dosage. Patient is not a candidate for NSAIDs. Follow-up as scheduled.

## 2023-11-24 NOTE — ASSESSMENT & PLAN NOTE
Wt Readings from Last 3 Encounters:   11/24/23 57.2 kg (126 lb)   07/28/23 59.4 kg (131 lb)   04/28/23 61 kg (134 lb 6.4 oz)     Stable. Follow-up as scheduled.

## 2023-11-29 DIAGNOSIS — I10 BENIGN ESSENTIAL HYPERTENSION: ICD-10-CM

## 2023-11-29 RX ORDER — FERROUS SULFATE 325(65) MG
1 TABLET ORAL
Qty: 30 TABLET | Refills: 0 | Status: SHIPPED | OUTPATIENT
Start: 2023-11-29

## 2023-11-29 RX ORDER — ALBUTEROL SULFATE 2.5 MG/3ML
2.5 SOLUTION RESPIRATORY (INHALATION) EVERY 4 HOURS PRN
Qty: 300 ML | Refills: 0 | Status: SHIPPED | OUTPATIENT
Start: 2023-11-29

## 2023-12-11 DIAGNOSIS — G89.4 CHRONIC PAIN SYNDROME: ICD-10-CM

## 2023-12-11 DIAGNOSIS — M54.16 LUMBAR RADICULOPATHY: ICD-10-CM

## 2023-12-11 DIAGNOSIS — M47.816 LUMBAR SPONDYLOSIS: ICD-10-CM

## 2023-12-11 RX ORDER — MORPHINE SULFATE 30 MG/1
30 CAPSULE, EXTENDED RELEASE ORAL DAILY
Qty: 30 CAPSULE | Refills: 0 | Status: SHIPPED | OUTPATIENT
Start: 2023-12-11

## 2023-12-11 RX ORDER — OXYCODONE AND ACETAMINOPHEN 10; 325 MG/1; MG/1
1 TABLET ORAL EVERY 6 HOURS PRN
Qty: 120 TABLET | Refills: 0 | Status: SHIPPED | OUTPATIENT
Start: 2023-12-11

## 2023-12-18 DIAGNOSIS — I10 BENIGN ESSENTIAL HYPERTENSION: ICD-10-CM

## 2023-12-18 RX ORDER — ALBUTEROL SULFATE 2.5 MG/3ML
2.5 SOLUTION RESPIRATORY (INHALATION) EVERY 4 HOURS PRN
Qty: 300 ML | Refills: 0 | Status: SHIPPED | OUTPATIENT
Start: 2023-12-18

## 2023-12-18 NOTE — TELEPHONE ENCOUNTER
Pt  requesting a couple extra refills on this prescription. He would like it called into [facility] Please.

## 2024-01-04 DIAGNOSIS — G89.4 CHRONIC PAIN SYNDROME: ICD-10-CM

## 2024-01-04 DIAGNOSIS — R52 PAIN: ICD-10-CM

## 2024-01-04 DIAGNOSIS — I10 BENIGN ESSENTIAL HYPERTENSION: ICD-10-CM

## 2024-01-04 DIAGNOSIS — R69 SICK: ICD-10-CM

## 2024-01-04 DIAGNOSIS — J44.9 CHRONIC OBSTRUCTIVE PULMONARY DISEASE, UNSPECIFIED COPD TYPE (HCC): ICD-10-CM

## 2024-01-04 RX ORDER — MULTIVITAMIN WITH FOLIC ACID 400 MCG
1 TABLET ORAL DAILY
Qty: 30 TABLET | Refills: 0 | Status: SHIPPED | OUTPATIENT
Start: 2024-01-04

## 2024-01-04 RX ORDER — RIFAMPIN 300 MG/1
600 CAPSULE ORAL DAILY
Qty: 30 CAPSULE | Refills: 5 | Status: SHIPPED | OUTPATIENT
Start: 2024-01-04 | End: 2024-02-03

## 2024-01-04 RX ORDER — ALBUTEROL SULFATE 90 UG/1
2 AEROSOL, METERED RESPIRATORY (INHALATION) EVERY 6 HOURS PRN
Qty: 18 G | Refills: 5 | Status: SHIPPED | OUTPATIENT
Start: 2024-01-04

## 2024-01-04 RX ORDER — PANTOPRAZOLE SODIUM 40 MG/1
40 TABLET, DELAYED RELEASE ORAL DAILY
Qty: 90 TABLET | Refills: 0 | Status: SHIPPED | OUTPATIENT
Start: 2024-01-04

## 2024-01-04 RX ORDER — ALBUTEROL SULFATE 2.5 MG/3ML
2.5 SOLUTION RESPIRATORY (INHALATION) EVERY 4 HOURS PRN
Qty: 300 ML | Refills: 0 | Status: SHIPPED | OUTPATIENT
Start: 2024-01-04

## 2024-01-09 ENCOUNTER — TELEPHONE (OUTPATIENT)
Age: 69
End: 2024-01-09

## 2024-01-09 DIAGNOSIS — G89.4 CHRONIC PAIN SYNDROME: ICD-10-CM

## 2024-01-09 DIAGNOSIS — M47.816 LUMBAR SPONDYLOSIS: ICD-10-CM

## 2024-01-09 DIAGNOSIS — F41.9 ANXIETY: ICD-10-CM

## 2024-01-09 DIAGNOSIS — F11.20 CONTINUOUS OPIOID DEPENDENCE (HCC): Primary | ICD-10-CM

## 2024-01-09 RX ORDER — OXYCODONE AND ACETAMINOPHEN 10; 325 MG/1; MG/1
1 TABLET ORAL EVERY 6 HOURS PRN
Qty: 120 TABLET | Refills: 0 | Status: SHIPPED | OUTPATIENT
Start: 2024-01-09

## 2024-01-09 RX ORDER — ALPRAZOLAM 0.25 MG/1
0.25 TABLET ORAL 3 TIMES DAILY PRN
Qty: 90 TABLET | Refills: 0 | Status: SHIPPED | OUTPATIENT
Start: 2024-01-09

## 2024-01-09 RX ORDER — NALOXONE HYDROCHLORIDE 4 MG/.1ML
SPRAY NASAL
Qty: 1 EACH | Refills: 1 | Status: SHIPPED | OUTPATIENT
Start: 2024-01-09 | End: 2025-01-08

## 2024-01-09 NOTE — TELEPHONE ENCOUNTER
Patient called regarding status of medication refill. Patient was informed that meds were sent to pharmacy.

## 2024-01-11 ENCOUNTER — TELEPHONE (OUTPATIENT)
Age: 69
End: 2024-01-11

## 2024-01-11 NOTE — TELEPHONE ENCOUNTER
Patient's daughter contacted the office this morning regarding an order that was placed by [facility] for a Sputum test to be completed. She was inquiring where she could go for this. I advised there are only certain locations that offer this, if she wanted to contact our central scheduling that they could possibly give her a list of closest locations she could go to for this particular test. She did not have the physical script as when patient was in the office with [facility] they were having issues printing information, I advised to contact infectious disease to obtain script, could get recommended locations from them but also wherever she would plan on taking her father they could fax the order to the location she chooses. Daughter understood and had no further questions at this time.

## 2024-01-19 DIAGNOSIS — G47.00 INSOMNIA, UNSPECIFIED TYPE: ICD-10-CM

## 2024-01-19 RX ORDER — ASPIRIN 81 MG/1
81 TABLET ORAL DAILY
Qty: 90 TABLET | Refills: 0 | Status: SHIPPED | OUTPATIENT
Start: 2024-01-19

## 2024-01-19 RX ORDER — CLOPIDOGREL BISULFATE 75 MG/1
75 TABLET ORAL DAILY
Qty: 90 TABLET | Refills: 0 | Status: SHIPPED | OUTPATIENT
Start: 2024-01-19

## 2024-01-31 DIAGNOSIS — I10 BENIGN ESSENTIAL HYPERTENSION: ICD-10-CM

## 2024-01-31 RX ORDER — ALBUTEROL SULFATE 2.5 MG/3ML
2.5 SOLUTION RESPIRATORY (INHALATION) EVERY 4 HOURS PRN
Qty: 300 ML | Refills: 0 | Status: SHIPPED | OUTPATIENT
Start: 2024-01-31

## 2024-01-31 RX ORDER — FERROUS SULFATE 325(65) MG
1 TABLET ORAL
Qty: 90 TABLET | Refills: 0 | Status: SHIPPED | OUTPATIENT
Start: 2024-01-31

## 2024-01-31 NOTE — TELEPHONE ENCOUNTER
Medication: Albuterol 0.083% nebulizer solution    Dose/Frequency: 2.5mg/3ml    Quantity: 300ml    Pharmacy: [facility]    Office:   [x] PCP/Provider -   [] Speciality/Provider -     Does the patient have enough for 3 days?   [] Yes   [x] No - Send as HP to POD    Pt states he is getting a new nebulizer by the end of this week       Medication: Ferrous Sulfate     Dose/Frequency: 325mg    Quantity: 30    Pharmacy: [facility]     Office:   [x] PCP/Provider -   [] Speciality/Provider -     Does the patient have enough for 3 days?   [] Yes   [x] No - Send as HP to POD

## 2024-02-06 DIAGNOSIS — M47.816 LUMBAR SPONDYLOSIS: ICD-10-CM

## 2024-02-06 DIAGNOSIS — R52 PAIN: ICD-10-CM

## 2024-02-06 DIAGNOSIS — G89.4 CHRONIC PAIN SYNDROME: ICD-10-CM

## 2024-02-06 RX ORDER — DULOXETIN HYDROCHLORIDE 60 MG/1
60 CAPSULE, DELAYED RELEASE ORAL DAILY
Qty: 90 CAPSULE | Refills: 0 | Status: SHIPPED | OUTPATIENT
Start: 2024-02-06

## 2024-02-06 RX ORDER — OXYCODONE AND ACETAMINOPHEN 10; 325 MG/1; MG/1
1 TABLET ORAL EVERY 6 HOURS PRN
Qty: 120 TABLET | Refills: 0 | Status: SHIPPED | OUTPATIENT
Start: 2024-02-06

## 2024-02-06 RX ORDER — MULTIVITAMIN WITH FOLIC ACID 400 MCG
1 TABLET ORAL DAILY
Qty: 30 TABLET | Refills: 0 | Status: SHIPPED | OUTPATIENT
Start: 2024-02-06

## 2024-02-06 NOTE — TELEPHONE ENCOUNTER
Patient called and was checking on status of refill. Please contact when refill has been sent to the pharmacy.

## 2024-02-14 DIAGNOSIS — G47.00 INSOMNIA, UNSPECIFIED TYPE: ICD-10-CM

## 2024-02-15 RX ORDER — CLOPIDOGREL BISULFATE 75 MG/1
75 TABLET ORAL DAILY
Qty: 30 TABLET | Refills: 0 | Status: SHIPPED | OUTPATIENT
Start: 2024-02-15

## 2024-02-16 DIAGNOSIS — I10 BENIGN ESSENTIAL HYPERTENSION: ICD-10-CM

## 2024-02-16 RX ORDER — ALBUTEROL SULFATE 2.5 MG/3ML
2.5 SOLUTION RESPIRATORY (INHALATION) EVERY 4 HOURS PRN
Qty: 300 ML | Refills: 2 | Status: SHIPPED | OUTPATIENT
Start: 2024-02-16

## 2024-02-16 NOTE — TELEPHONE ENCOUNTER
Medication:     albuterol (2.5 mg/3 mL) 0.083 % nebulizer solution       Dose/Frequency:     Take 3 mL (2.5 mg total) by nebulization every 4 (four) hours as needed for wheezing or shortness of breath       Quantity: 300 mL     [id number] [address]     Office:   [x] PCP/Provider -   [] Speciality/Provider -     Does the patient have enough for 3 days?   [] Yes   [x] No - Send as HP to POD

## 2024-02-20 ENCOUNTER — PATIENT OUTREACH (OUTPATIENT)
Dept: FAMILY MEDICINE CLINIC | Facility: CLINIC | Age: 69
End: 2024-02-20

## 2024-02-20 ENCOUNTER — TELEMEDICINE (OUTPATIENT)
Dept: FAMILY MEDICINE CLINIC | Facility: CLINIC | Age: 69
End: 2024-02-20
Payer: COMMERCIAL

## 2024-02-20 DIAGNOSIS — Z78.9 DEFICITS IN ACTIVITIES OF DAILY LIVING: Primary | ICD-10-CM

## 2024-02-20 DIAGNOSIS — R26.2 AMBULATORY DYSFUNCTION: ICD-10-CM

## 2024-02-20 DIAGNOSIS — Z74.2 ASSISTANCE NEEDED AT HOME: Primary | ICD-10-CM

## 2024-02-20 DIAGNOSIS — E44.0 MODERATE PROTEIN-CALORIE MALNUTRITION (HCC): ICD-10-CM

## 2024-02-20 DIAGNOSIS — Z91.81 PERSONAL HISTORY OF FALL: ICD-10-CM

## 2024-02-20 PROCEDURE — 99213 OFFICE O/P EST LOW 20 MIN: CPT | Performed by: FAMILY MEDICINE

## 2024-02-20 NOTE — PROGRESS NOTES
Virtual Brief Visit    This Visit is being completed by telephone. The Patient is located at Home and in the following state in which I hold an active license [place]    The patient was identified by name and date of birth. Vasile [place] was informed that this is a telemedicine visit and that the visit is being conducted through the [employer] Teams platform. He agrees to proceed..  My office door was closed. No one else was in the room.  He acknowledged consent and understanding of privacy and security of the video platform. The patient has agreed to participate and understands they can discontinue the visit at any time.    Patient is aware this is a billable service.       He is concerned about his health. He feels he needs to get back into [facility].   He was in this facility in the past after a fall/hospitalization and was subsequently discharged from home to there.   Uses [facility] at baseline  Frequent falls in the past   Needs assistance with meals, finances, dressing himself   He bathes himself but admits to some difficulty.   He has COPD and uses nebs multiple times a day  He feels he doesn't get the assistance her needs at home and was doing better when in the nursing home. He also misses the socialization aspect of the facility.  He lives with his daughter and currently gets most of his assistance from her  He does give me permission for us to discuss with his daughter as well.     On chart review, PMH includes GERD, COPD, CAD, chronic pain, osteoarthritis, sacroiliitis, CHF, malnutrition    Assessment/Plan:    Problem List Items Addressed This Visit       Moderate protein-calorie malnutrition (HCC)    Relevant Orders    Ambulatory Referral to Social Work Care Management Program     Other Visit Diagnoses       Deficits in activities of daily living    -  Primary    Relevant Orders    Ambulatory Referral to Social Work Care Management Program    Personal history of fall        Relevant  Orders    Ambulatory Referral to Social Work Care Management Program    Ambulatory dysfunction        Relevant Orders    Ambulatory Referral to Social Work Care Management Program          Discussed with patient we will refer him to social work to have case mgmt assist with steps needed to get nursing home care. Our office will help with completing any necessary documentation for him as needed.     Recent Visits  No visits were found meeting these conditions.  Showing recent visits within past 7 days and meeting all other requirements  Today's Visits  Date Type Provider Dept   02/20/24 Telemedicine DO Neftali Tristna   Showing today's visits and meeting all other requirements  Future Appointments  No visits were found meeting these conditions.  Showing future appointments within next 150 days and meeting all other requirements         Visit Time  Total Visit Duration: 21 minutes  Call time 11 minutes, Documentation and chart review time, 10 minutes

## 2024-02-20 NOTE — PROGRESS NOTES
OP CM rcvd referral that pt would like to go back to [facility].  Pt was there in the past for rehab.  Pt has [employer] Medicare so he would need authorization and he has not had a recent hospitalization so he would not qualify.  Pt could see if he is eligible to go under [facility] but would need to contact [facility] to do assessment and work this out with the nursing facility as well.       Called to pts phone and his phone is not in service.  Called to pts dtr Lashanda to see if they want to look into keeping him home and she being his paid home health aide or pursuing SNF placement.      UPDATE:    Rcvd call back from pts argeliar Lashanda and she states that she is frustrated because all pt wants to do is smoke cigarettes and take percocet.  Pts dtr states she works during the day but checks on pt daily and brings him food.  Pt resides with his other dtr [facility].  Dtr Lashanda states pt does need help bathing and dressing.  Pts dtr states she would like an agency to be the aide.  Pts neighbor or dtr drive pts to appts.  Dtr states that he is under the 2742 a month income and has less than 8000 in the bank.  Dtr states since pt is reporting he is lonely she will make sure she takes him out more on her days off.  Dtr states she will make sure he has more socialization.      Pt has a roller walker and hospital bed at home.      Referral placed to [facility] for assistance with home health waiver.  Dtr Lashanda would like to be contact for waiver since pt does not always answer the phone.  OP CM will remain available.

## 2024-02-21 PROBLEM — Z13.89 SCREENING FOR NEUROLOGICAL CONDITION: Status: RESOLVED | Noted: 2018-10-18 | Resolved: 2024-02-21

## 2024-02-21 PROBLEM — Z00.00 MEDICARE ANNUAL WELLNESS VISIT, INITIAL: Status: RESOLVED | Noted: 2019-01-18 | Resolved: 2024-02-21

## 2024-02-23 ENCOUNTER — PATIENT OUTREACH (OUTPATIENT)
Dept: FAMILY MEDICINE CLINIC | Facility: CLINIC | Age: 69
End: 2024-02-23

## 2024-02-23 NOTE — PROGRESS NOTES
"[facility] received a referral from ROBERTO Winslow to assist patient with applying for waiver services.     [facility] contacted [facility] to discuss the referral. Explained the process of applying for waiver services and gave examples of some of the documentation that may be requested.  Duarte states he would like to \"take some time to think about it\" and asks if he could call back when he decides if he wants to proceed.   [facility] agreed. Confirmed [facility] has this writer's contact information and provided business hours.     Will outreach in two weeks to assess if no contact prior.    "

## 2024-02-27 ENCOUNTER — OFFICE VISIT (OUTPATIENT)
Dept: FAMILY MEDICINE CLINIC | Facility: CLINIC | Age: 69
End: 2024-02-27
Payer: COMMERCIAL

## 2024-02-27 VITALS
OXYGEN SATURATION: 100 % | DIASTOLIC BLOOD PRESSURE: 90 MMHG | HEART RATE: 52 BPM | WEIGHT: 130 LBS | TEMPERATURE: 97.5 F | BODY MASS INDEX: 17.63 KG/M2 | SYSTOLIC BLOOD PRESSURE: 140 MMHG

## 2024-02-27 DIAGNOSIS — F41.9 ANXIETY: ICD-10-CM

## 2024-02-27 DIAGNOSIS — I10 BENIGN ESSENTIAL HYPERTENSION: ICD-10-CM

## 2024-02-27 DIAGNOSIS — F11.20 UNCOMPLICATED OPIOID DEPENDENCE (HCC): ICD-10-CM

## 2024-02-27 DIAGNOSIS — R26.2 AMBULATORY DYSFUNCTION: ICD-10-CM

## 2024-02-27 DIAGNOSIS — K21.9 GASTROESOPHAGEAL REFLUX DISEASE, UNSPECIFIED WHETHER ESOPHAGITIS PRESENT: ICD-10-CM

## 2024-02-27 DIAGNOSIS — M54.16 LUMBAR RADICULOPATHY: ICD-10-CM

## 2024-02-27 DIAGNOSIS — Z78.9 DEFICITS IN ACTIVITIES OF DAILY LIVING: ICD-10-CM

## 2024-02-27 DIAGNOSIS — M47.816 LUMBAR SPONDYLOSIS: ICD-10-CM

## 2024-02-27 DIAGNOSIS — M17.0 PRIMARY OSTEOARTHRITIS OF BOTH KNEES: ICD-10-CM

## 2024-02-27 DIAGNOSIS — D64.9 ANEMIA, UNSPECIFIED TYPE: ICD-10-CM

## 2024-02-27 DIAGNOSIS — J43.9 PULMONARY EMPHYSEMA, UNSPECIFIED EMPHYSEMA TYPE (HCC): ICD-10-CM

## 2024-02-27 DIAGNOSIS — Z91.81 PERSONAL HISTORY OF FALL: ICD-10-CM

## 2024-02-27 DIAGNOSIS — I50.31 ACUTE DIASTOLIC CONGESTIVE HEART FAILURE (HCC): ICD-10-CM

## 2024-02-27 DIAGNOSIS — Z74.2 ASSISTANCE NEEDED AT HOME: ICD-10-CM

## 2024-02-27 DIAGNOSIS — E87.1 HYPONATREMIA: ICD-10-CM

## 2024-02-27 DIAGNOSIS — R31.9 HEMATURIA, UNSPECIFIED TYPE: Primary | ICD-10-CM

## 2024-02-27 PROCEDURE — 99214 OFFICE O/P EST MOD 30 MIN: CPT

## 2024-02-27 NOTE — ASSESSMENT & PLAN NOTE
Patient has chronic pain that affects his ability to complete activities of daily life.  He is helped with Percocet 10-3 25 every 6 hours as needed.

## 2024-02-27 NOTE — ASSESSMENT & PLAN NOTE
Stable.  Contact office or go to emergency department if symptoms present.  On Lasix and losartan.  Wt Readings from Last 3 Encounters:   02/27/24 59 kg (130 lb)   11/24/23 57.2 kg (126 lb)   07/28/23 59.4 kg (131 lb)

## 2024-02-27 NOTE — ASSESSMENT & PLAN NOTE
Will follow-up pending results of urinalysis and urine culture.  Patient is to contact office if symptoms recur.

## 2024-02-27 NOTE — PROGRESS NOTES
Name: Vasile Lake      : 1955      MRN: [id number]  Encounter Provider: Cleve Magallanes PA-C  Encounter Date: 2024   Encounter department: [facility]    Assessment & Plan     1. Hematuria, unspecified type  Assessment & Plan:  Will follow-up pending results of urinalysis and urine culture.  Patient is to contact office if symptoms recur.    Orders:  -     Urinalysis with microscopic; Future  -     Urine culture; Future    2. Gastroesophageal reflux disease, unspecified whether esophagitis present  Assessment & Plan:  Stable.  Continue dietary control.  Follow-up with any worsening.      3. Pulmonary emphysema, unspecified emphysema type (HCC)  Assessment & Plan:  Stable.  No symptoms of exacerbation today.  Continue albuterol as needed and Trelegy.  Patient is still smoking, and educated patient about cessation and further health risk.  Patient denies today.  Patient is to call office if he changes his mind.  Patient is to contact office if symptoms of exacerbation or go to emergency room.      4. Benign essential hypertension  Assessment & Plan:  Stable.  Continue current treatment.      5. Lumbar radiculopathy  Assessment & Plan:  Patient has chronic pain that affects his ability to complete activities of daily life.  He is helped with Percocet 10-3 25 every 6 hours as needed.      6. Primary osteoarthritis of both knees  Assessment & Plan:  Patient has chronic pain that affects his ability to complete activities of daily life.  He is helped with Percocet 10-3 25 every 6 hours as needed.      7. Lumbar spondylosis  Assessment & Plan:  Patient has chronic pain that affects his ability to complete activities of daily life.  He is helped with Percocet 10-3 25 every 6 hours as needed.      8. Anxiety  Assessment & Plan:  Stable.  Contact office with exacerbation.      9. Uncomplicated opioid dependence (HCC)  Assessment & Plan:  CSA up-to-date.      10. Acute diastolic  congestive heart failure (HCC)  Assessment & Plan:  Stable.  Contact office or go to emergency department if symptoms present.  On Lasix and losartan.  Wt Readings from Last 3 Encounters:   02/27/24 59 kg (130 lb)   11/24/23 57.2 kg (126 lb)   07/28/23 59.4 kg (131 lb)                 11. Anemia, unspecified type  Assessment & Plan:  Will follow-up pending lab results.    Orders:  -     CBC and differential; Future  -     Iron Panel (Includes Ferritin, Iron Sat%, Iron, and TIBC); Future    12. Hyponatremia  Assessment & Plan:  Will follow-up pending lab results.    Orders:  -     Comprehensive metabolic panel; Future    13. Deficits in activities of daily living  -     Ambulatory Referral to Social Work Care Management Program; Future    14. Assistance needed at home  -     Ambulatory Referral to Social Work Care Management Program; Future    15. Personal history of fall  -     Ambulatory Referral to Social Work Care Management Program; Future    16. Ambulatory dysfunction  -     Ambulatory Referral to Social Work Care Management Program; Future    Patient is referred back to care management program so they reach back out to them.  Patient would at least benefit from home health, but he would likely benefit more from assisted living/nursing home care.  Office here will help with completing documentation if needed.       Subjective      Patient is a 68-year-old male presenting for CDM follow-up.  Patient states he had blood in his urine a few weeks ago, but that has subsided.  Denies blood in urine, dysuria, frequency, urgency, hesitancy, flank pain, decreased urine output.  Patient also brings up that he would like to the nursing home.  Patient was recently in the nursing home after a fall, and was discharged home.  Patient has ambulatory dysfunction, trouble completing activities of daily life, financial difficulties, fall risk, and chronic pain.  Patient was referred after last telemedicine visit about a week ago  to care management, and said he needed some time to think about it.  Patient believes that he has a far easier time while in the nursing home, and also misses a socialization aspect of the facility.  He is currently living with his daughter, and she helps him complete activities of daily life.      Review of Systems   Constitutional:  Negative for appetite change, chills, diaphoresis, fatigue and fever.   HENT:  Negative for congestion, ear discharge, ear pain, postnasal drip, rhinorrhea, sinus pressure, sinus pain, sneezing and sore throat.    Eyes:  Negative for pain, discharge, redness, itching and visual disturbance.   Respiratory:  Positive for shortness of breath. Negative for apnea, cough, chest tightness and wheezing.    Cardiovascular:  Negative for chest pain, palpitations and leg swelling.   Gastrointestinal:  Negative for abdominal pain, blood in stool, constipation, diarrhea, nausea and vomiting.   Endocrine: Negative for cold intolerance, heat intolerance, polydipsia and polyuria.   Genitourinary:  Positive for hematuria (Recently. Not currently.). Negative for dysuria, flank pain, frequency and urgency.   Musculoskeletal:  Positive for arthralgias, back pain, gait problem and myalgias. Negative for neck pain and neck stiffness.   Skin:  Negative for color change and rash.   Allergic/Immunologic: Negative.    Neurological:  Negative for dizziness, tremors, seizures, syncope, facial asymmetry, speech difficulty, weakness, light-headedness, numbness and headaches.   Hematological:  Negative for adenopathy. Does not bruise/bleed easily.   Psychiatric/Behavioral:  Negative for agitation, confusion, decreased concentration, dysphoric mood, hallucinations, self-injury, sleep disturbance and suicidal ideas. The patient is not nervous/anxious and is not hyperactive.    All other systems reviewed and are negative.      Current Outpatient Medications on File Prior to Visit   Medication Sig    albuterol (2.5 mg/3  mL) 0.083 % nebulizer solution Take 3 mL (2.5 mg total) by nebulization every 4 (four) hours as needed for wheezing or shortness of breath    albuterol (PROVENTIL HFA,VENTOLIN HFA) 90 mcg/act inhaler Inhale 2 puffs every 6 (six) hours as needed for wheezing    ALPRAZolam (XANAX) 0.25 mg tablet Take 1 tablet (0.25 mg total) by mouth 3 (three) times a day as needed for anxiety    ascorbic acid (VITAMIN C) 250 mg tablet Take 500 mg by mouth daily    aspirin (Aspirin Low Dose) 81 mg EC tablet Take 1 tablet (81 mg total) by mouth daily    atorvastatin (LIPITOR) 80 mg tablet take 1 tablet by mouth once daily    azithromycin (ZITHROMAX) 500 MG tablet Take 500 mg by mouth daily    clopidogrel (PLAVIX) 75 mg tablet take 1 tablet by mouth once daily    collagenase (Santyl) ointment Apply topically daily    Diclofenac Sodium (VOLTAREN) 1 % Apply 4 g topically 4 (four) times a day    DULoxetine (CYMBALTA) 60 mg delayed release capsule Take 1 capsule (60 mg total) by mouth daily    ergocalciferol (ERGOCALCIFEROL) 1.25 MG (26683 UT) capsule Take 125 mcg by mouth once a week    ethambutol (MYAMBUTOL) 100 mg tablet TAKE 12 TABLETS BY MOUTH ONCE DAILY    ferrous sulfate 325 (65 Fe) mg tablet Take 1 tablet (325 mg total) by mouth daily before breakfast    fluticasone-umeclidinium-vilanterol (Trelegy Ellipta) 100-62.5-25 mcg/actuation inhaler Inhale 1 puff daily Rinse mouth after use.    furosemide (LASIX) 40 mg tablet Take 1 tablet (40 mg total) by mouth daily    gabapentin (NEURONTIN) 300 mg capsule Take 1 capsule (300 mg total) by mouth 2 (two) times a day    losartan (COZAAR) 50 mg tablet take 1 tablet by mouth once daily    metoprolol succinate (TOPROL-XL) 25 mg 24 hr tablet Take 1 tablet (25 mg total) by mouth daily    Multiple Vitamin (Tab-A-Douglas) TABS Take 1 tablet by mouth daily    naloxone (NARCAN) 4 mg/0.1 mL nasal spray Administer 1 spray into a nostril. If no response after 2-3 minutes, give another dose in the other  nostril using a new spray.    oxyCODONE-acetaminophen (PERCOCET)  mg per tablet Take 1 tablet by mouth every 6 (six) hours as needed for moderate pain Max Daily Amount: 4 tablets    pantoprazole (PROTONIX) 40 mg tablet Take 1 tablet (40 mg total) by mouth daily    Probiotic Product (Bacid) CAPS Take 1 capsule by mouth in the morning    rifampin (RIFADIN) 300 mg capsule Take 2 capsules (600 mg total) by mouth daily    zinc sulfate (ZINCATE) 220 mg capsule take 1 capsule by mouth once daily UNTIL 4/6/2023    Lidocaine 4 % PTCH Place 1 patch on the skin daily (Patient not taking: Reported on 11/24/2023)    morphine (AVINza) 30 MG 24 hr capsule Take 1 capsule (30 mg total) by mouth daily Max Daily Amount: 30 mg (Patient not taking: Reported on 2/27/2024)       Objective     /90 (BP Location: Left arm, Patient Position: Sitting)   Pulse (!) 52   Temp 97.5 °F (36.4 °C) (Tympanic)   Wt 59 kg (130 lb)   SpO2 100%   BMI 17.63 kg/m²     Physical Exam  Vitals and nursing note reviewed.   Constitutional:       General: He is not in acute distress.     Appearance: Normal appearance. He is normal weight. He is not ill-appearing, toxic-appearing or diaphoretic.   HENT:      Head: Normocephalic and atraumatic.      Right Ear: Tympanic membrane normal.      Left Ear: Tympanic membrane normal.      Nose: Nose normal.      Mouth/Throat:      Mouth: Mucous membranes are moist.      Pharynx: Oropharynx is clear.   Eyes:      Extraocular Movements: Extraocular movements intact.      Conjunctiva/sclera: Conjunctivae normal.      Pupils: Pupils are equal, round, and reactive to light.   Cardiovascular:      Rate and Rhythm: Normal rate and regular rhythm.      Pulses: Normal pulses.      Heart sounds: Normal heart sounds. No murmur heard.  Pulmonary:      Effort: Pulmonary effort is normal. No respiratory distress.      Breath sounds: No stridor. Wheezing (Expiratory wheeze.) present. No rhonchi or rales.   Chest:       Chest wall: No tenderness.   Abdominal:      General: Bowel sounds are normal.      Palpations: Abdomen is soft. There is no mass.      Tenderness: There is no abdominal tenderness.   Musculoskeletal:         General: Tenderness (Palpation of extremities and back.) present. Normal range of motion.      Cervical back: Normal range of motion and neck supple. No tenderness.      Right lower leg: No edema.      Left lower leg: No edema.   Lymphadenopathy:      Cervical: No cervical adenopathy.   Skin:     General: Skin is warm.      Capillary Refill: Capillary refill takes less than 2 seconds.      Findings: No erythema, lesion or rash.   Neurological:      General: No focal deficit present.      Mental Status: He is alert and oriented to person, place, and time. Mental status is at baseline.      Motor: No weakness.      Coordination: Coordination normal.      Gait: Gait abnormal.   Psychiatric:         Mood and Affect: Mood normal.         Behavior: Behavior normal.         Thought Content: Thought content normal.         Judgment: Judgment normal.       Cleve Magallanes PA-C

## 2024-02-27 NOTE — ASSESSMENT & PLAN NOTE
Stable.  No symptoms of exacerbation today.  Continue albuterol as needed and Trelegy.  Patient is still smoking, and educated patient about cessation and further health risk.  Patient denies today.  Patient is to call office if he changes his mind.  Patient is to contact office if symptoms of exacerbation or go to emergency room.

## 2024-02-28 ENCOUNTER — PATIENT OUTREACH (OUTPATIENT)
Dept: FAMILY MEDICINE CLINIC | Facility: CLINIC | Age: 69
End: 2024-02-28

## 2024-02-28 NOTE — PROGRESS NOTES
OP CM rcvd inbasket from [facility] that pt needs time to think about doing the home health waiver.   OP CM and [facility] to remain available.

## 2024-02-29 DIAGNOSIS — G47.00 INSOMNIA, UNSPECIFIED TYPE: ICD-10-CM

## 2024-03-01 DIAGNOSIS — G89.4 CHRONIC PAIN SYNDROME: ICD-10-CM

## 2024-03-01 DIAGNOSIS — G47.00 INSOMNIA, UNSPECIFIED TYPE: ICD-10-CM

## 2024-03-01 RX ORDER — METOPROLOL SUCCINATE 25 MG/1
25 TABLET, EXTENDED RELEASE ORAL DAILY
Qty: 90 TABLET | Refills: 1 | Status: SHIPPED | OUTPATIENT
Start: 2024-03-01

## 2024-03-01 RX ORDER — METOPROLOL SUCCINATE 25 MG/1
25 TABLET, EXTENDED RELEASE ORAL DAILY
Qty: 90 TABLET | Refills: 1 | OUTPATIENT
Start: 2024-03-01

## 2024-03-01 NOTE — TELEPHONE ENCOUNTER
Last seen 02/27/2024    Patient is requesting a refill for metropolol succinate (Toprol-XL) tablets and Probiotic product (Bacid) caplets. Patient is using [facility].

## 2024-03-08 RX ORDER — BACILLUS COAGULANS 1B CELL
1 CAPSULE ORAL DAILY
Qty: 90 CAPSULE | Refills: 0 | Status: SHIPPED | OUTPATIENT
Start: 2024-03-08 | End: 2024-03-12

## 2024-03-11 ENCOUNTER — TELEPHONE (OUTPATIENT)
Age: 69
End: 2024-03-11

## 2024-10-16 NOTE — PROGRESS NOTES
Patient seen for IE on 11/21/18 and 1 visit after  Patient called PT clinic after last visit and stated he was possibly going to have surgery on LB  Patient to be discharged at this time  no... 2-3x/week 3-5x/week
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
## (undated) DEVICE — PLASTIC ADHESIVE BANDAGE: Brand: CURITY

## (undated) DEVICE — DRAPE SHEET THREE QUARTER

## (undated) DEVICE — IV EXTENSION TUBING 33 IN

## (undated) DEVICE — TRAY EPIDURAL PERIFIX 20GA X 3.5IN TUOHY 8ML

## (undated) DEVICE — NEEDLE BLUNT 18 G X 1 1/2IN

## (undated) DEVICE — SYRINGE 10ML LL

## (undated) DEVICE — FLEXIBLE ADHESIVE BANDAGE,X-LARGE: Brand: CURITY

## (undated) DEVICE — UTILITY MARKER,BLACK WITH LABELS: Brand: DEVON

## (undated) DEVICE — CHLORAPREP HI-LITE 10.5ML ORANGE

## (undated) DEVICE — NEEDLE 25G X 1 1/2

## (undated) DEVICE — GLOVE SRG BIOGEL 8

## (undated) DEVICE — GAUZE SPONGES,USP TYPE VII GAUZE, 12 PLY: Brand: CURITY

## (undated) DEVICE — NEEDLE SPINAL 22G X 3.5IN  QUINCKE

## (undated) DEVICE — NEEDLE SPINAL 25G X 3.5 IN QUINCKE

## (undated) DEVICE — SYRINGE 5ML LL

## (undated) DEVICE — GLOVE INDICATOR PI UNDERGLOVE SZ 7.5 BLUE

## (undated) DEVICE — GAUZE SPONGES,16 PLY: Brand: CURITY

## (undated) DEVICE — TELFA ADHESIVE ISLAND DRESSING: Brand: TELFA

## (undated) DEVICE — Device

## (undated) DEVICE — IV EXTENSION TUBING SMALL BORE

## (undated) DEVICE — PAD GROUNDING IONIC RF DISP

## (undated) DEVICE — STERILE LATEX POWDER-FREE SURGICAL GLOVESWITH NITRILE COATING: Brand: PROTEXIS

## (undated) DEVICE — ELECTRODE RF 10CM

## (undated) DEVICE — DRAPE TOWEL: Brand: CONVERTORS

## (undated) DEVICE — GLOVE SRG LF STRL BGL SKNSNS 7.5 PF